# Patient Record
Sex: MALE | Race: BLACK OR AFRICAN AMERICAN | NOT HISPANIC OR LATINO | Employment: OTHER | URBAN - METROPOLITAN AREA
[De-identification: names, ages, dates, MRNs, and addresses within clinical notes are randomized per-mention and may not be internally consistent; named-entity substitution may affect disease eponyms.]

---

## 2019-12-19 ENCOUNTER — TELEPHONE (OUTPATIENT)
Dept: UROLOGY | Facility: CLINIC | Age: 64
End: 2019-12-19

## 2020-02-13 ENCOUNTER — TELEPHONE (OUTPATIENT)
Dept: UROLOGY | Facility: CLINIC | Age: 65
End: 2020-02-13

## 2020-02-17 ENCOUNTER — TELEPHONE (OUTPATIENT)
Dept: UROLOGY | Facility: CLINIC | Age: 65
End: 2020-02-17

## 2020-03-10 ENCOUNTER — OFFICE VISIT (OUTPATIENT)
Dept: UROLOGY | Facility: CLINIC | Age: 65
End: 2020-03-10
Payer: COMMERCIAL

## 2020-03-10 VITALS
DIASTOLIC BLOOD PRESSURE: 81 MMHG | BODY MASS INDEX: 22.12 KG/M2 | HEIGHT: 71 IN | HEART RATE: 78 BPM | WEIGHT: 158 LBS | SYSTOLIC BLOOD PRESSURE: 129 MMHG

## 2020-03-10 DIAGNOSIS — R97.20 ELEVATED PSA: ICD-10-CM

## 2020-03-10 DIAGNOSIS — R39.89 ABNORMAL PROSTATE EXAM: Primary | ICD-10-CM

## 2020-03-10 LAB
BILIRUB SERPL-MCNC: NORMAL MG/DL
BLOOD URINE, POC: NORMAL
COLOR, POC UA: YELLOW
GLUCOSE UR QL STRIP: 500
KETONES UR QL STRIP: NORMAL
LEUKOCYTE ESTERASE URINE, POC: NORMAL
NITRITE, POC UA: NORMAL
PH, POC UA: 5
PROTEIN, POC: 30
SPECIFIC GRAVITY, POC UA: 1.02
UROBILINOGEN, POC UA: NORMAL

## 2020-03-10 PROCEDURE — 3008F BODY MASS INDEX DOCD: CPT | Mod: CPTII,S$GLB,, | Performed by: UROLOGY

## 2020-03-10 PROCEDURE — 99204 PR OFFICE/OUTPT VISIT, NEW, LEVL IV, 45-59 MIN: ICD-10-PCS | Mod: 25,S$GLB,, | Performed by: UROLOGY

## 2020-03-10 PROCEDURE — 3008F PR BODY MASS INDEX (BMI) DOCUMENTED: ICD-10-PCS | Mod: CPTII,S$GLB,, | Performed by: UROLOGY

## 2020-03-10 PROCEDURE — 1101F PR PT FALLS ASSESS DOC 0-1 FALLS W/OUT INJ PAST YR: ICD-10-PCS | Mod: CPTII,S$GLB,, | Performed by: UROLOGY

## 2020-03-10 PROCEDURE — 99204 OFFICE O/P NEW MOD 45 MIN: CPT | Mod: 25,S$GLB,, | Performed by: UROLOGY

## 2020-03-10 PROCEDURE — 1101F PT FALLS ASSESS-DOCD LE1/YR: CPT | Mod: CPTII,S$GLB,, | Performed by: UROLOGY

## 2020-03-10 PROCEDURE — 81002 URINALYSIS NONAUTO W/O SCOPE: CPT | Mod: S$GLB,,, | Performed by: UROLOGY

## 2020-03-10 PROCEDURE — 81002 POCT URINE DIPSTICK WITHOUT MICROSCOPE: ICD-10-PCS | Mod: S$GLB,,, | Performed by: UROLOGY

## 2020-03-10 RX ORDER — TAMSULOSIN HYDROCHLORIDE 0.4 MG/1
1 CAPSULE ORAL
COMMUNITY
Start: 2019-11-04 | End: 2024-03-15

## 2020-03-10 RX ORDER — CIPROFLOXACIN 500 MG/1
500 TABLET ORAL 2 TIMES DAILY
Qty: 4 TABLET | Refills: 0 | Status: SHIPPED | OUTPATIENT
Start: 2020-03-10 | End: 2020-03-12

## 2020-03-10 RX ORDER — SIMVASTATIN 40 MG/1
1 TABLET, FILM COATED ORAL
COMMUNITY
Start: 2019-12-11 | End: 2024-03-15

## 2020-03-10 RX ORDER — METFORMIN HYDROCHLORIDE 500 MG/1
1 TABLET ORAL
COMMUNITY
Start: 2019-12-11 | End: 2021-02-02

## 2020-03-10 NOTE — PROGRESS NOTES
"Subjective:      Lan Robles is a 64 y.o. male who was referred by Dr Matthew Heath for evaluation of elevated PSA and abnormal prostate exam.    +obstructive LUTS  Pt is     No fam hist of prostate ca    No bone pain  His brother  of AIDS yesterday    Some weight loss due to not eating because he has been upset about his brother    See Dr Heath's ntoes:            The following portions of the patient's history were reviewed and updated as appropriate: allergies, current medications, past family history, past medical history, past social history, past surgical history and problem list.    Review of Systems  Constitutional: no fever or chills  ENT: no nasal congestion or sore throat  Respiratory: no cough or shortness of breath  Cardiovascular: no chest pain or palpitations  Gastrointestinal: no nausea or vomiting, tolerating diet  Genitourinary: as per HPI  Hematologic/Lymphatic: no easy bruising or lymphadenopathy  Musculoskeletal: no arthralgias or myalgias  Neurological: no seizures or tremors  Behavioral/Psych: no auditory or visual hallucinations     Objective:   Vitals: /81 (BP Location: Right arm, Patient Position: Sitting, BP Method: X-Large (Automatic))   Pulse 78   Ht 5' 11" (1.803 m)   Wt 71.7 kg (158 lb)   BMI 22.04 kg/m²     Physical Exam   General: alert and oriented, no acute distress  Head: normocephalic, atraumatic  Neck: supple, no lymphadenopathy, normal ROM, no masses  Respiratory: Symmetric expansion, non-labored breathing  Cardiovascular: regular rate and rhythm, nomal pulses, no peripheral edema  Abdomen: soft, non tender, non distended, no palpable masses, no hernias, no hepatomegaly or splenomegaly  Genitourinary:   Penis: normal, no lesions, patent orthotopic meatus, no plaques  Scrotum: no rashes or skin changes;   Testes: descended bilaterally, no masses, nontender, normal epididymides bilaterally, no hydroceles  Prostate: see diagram; seminal " vesicles not palpated  Rectum: normal rectal tone, no rectal mass, normal perineum  Lymphatic: no inguinal nodes  Skin: normal coloration and turgor, no rashes, no suspicious skin lesions noted  Neuro: alert and oriented x3, no gross deficits  Psych: normal judgment and insight, normal mood/affect and non-anxious  Prostate is large and rock hard  Nodular    Physical Exam   Genitourinary:           Lab Review   Urinalysis demonstrates +glucose and protein otherwise neg      Lab Results   Component Value Date    WBC 9.85 05/19/2010    HGB 15.2 05/19/2010    HCT 45.7 05/19/2010    MCV 85.6 05/19/2010     05/19/2010     Lab Results   Component Value Date    CREATININE 1.0 05/19/2010    BUN 14 05/19/2010        per Dr. Heath's notes    No results found for: PSA  Imaging  -  Assessment:     1. Abnormal prostate exam    2. Elevated PSA      -  Plan:     Orders Placed This Encounter    Transrectal Ultrasound w/ Biopsy    POCT URINE DIPSTICK WITHOUT MICROSCOPE    ciprofloxacin HCl (CIPRO) 500 MG tablet   will schedule biopsy this week    I explained that I am concerned he may have prostate cancer  Discussed biopsy and answered all of his questions    I gave him my condolences on his brother's passing

## 2020-03-12 ENCOUNTER — PROCEDURE VISIT (OUTPATIENT)
Dept: UROLOGY | Facility: CLINIC | Age: 65
End: 2020-03-12
Payer: COMMERCIAL

## 2020-03-12 VITALS
HEIGHT: 71 IN | HEART RATE: 84 BPM | SYSTOLIC BLOOD PRESSURE: 134 MMHG | WEIGHT: 158 LBS | BODY MASS INDEX: 22.12 KG/M2 | DIASTOLIC BLOOD PRESSURE: 91 MMHG

## 2020-03-12 DIAGNOSIS — R97.20 ELEVATED PSA: ICD-10-CM

## 2020-03-12 DIAGNOSIS — R39.89 ABNORMAL PROSTATE EXAM: Primary | ICD-10-CM

## 2020-03-12 LAB
BILIRUB SERPL-MCNC: ABNORMAL MG/DL
BLOOD URINE, POC: ABNORMAL
COLOR, POC UA: ABNORMAL
GLUCOSE UR QL STRIP: 250
KETONES UR QL STRIP: ABNORMAL
LEUKOCYTE ESTERASE URINE, POC: ABNORMAL
NITRITE, POC UA: ABNORMAL
PH, POC UA: 5
PROTEIN, POC: + 30
SPECIFIC GRAVITY, POC UA: 1.03
UROBILINOGEN, POC UA: NORMAL

## 2020-03-12 PROCEDURE — 96372 PR INJECTION,THERAP/PROPH/DIAG2ST, IM OR SUBCUT: ICD-10-PCS | Mod: 59,S$GLB,, | Performed by: UROLOGY

## 2020-03-12 PROCEDURE — 55700 TRANSRECTAL ULTRASOUND W/ BIOPSY: CPT | Mod: S$GLB,,, | Performed by: UROLOGY

## 2020-03-12 PROCEDURE — 55700 TRANSRECTAL ULTRASOUND W/ BIOPSY: ICD-10-PCS | Mod: S$GLB,,, | Performed by: UROLOGY

## 2020-03-12 PROCEDURE — 88305 TISSUE EXAM BY PATHOLOGIST: CPT | Performed by: PATHOLOGY

## 2020-03-12 PROCEDURE — 88305 TISSUE EXAM BY PATHOLOGIST: CPT | Mod: 26,,, | Performed by: PATHOLOGY

## 2020-03-12 PROCEDURE — 96372 THER/PROPH/DIAG INJ SC/IM: CPT | Mod: 59,S$GLB,, | Performed by: UROLOGY

## 2020-03-12 PROCEDURE — 76872 TRANSRECTAL ULTRASOUND W/ BIOPSY: ICD-10-PCS | Mod: 26,S$GLB,, | Performed by: UROLOGY

## 2020-03-12 PROCEDURE — 88305 TISSUE EXAM BY PATHOLOGIST: ICD-10-PCS | Mod: 26,,, | Performed by: PATHOLOGY

## 2020-03-12 PROCEDURE — 81002 POCT URINE DIPSTICK WITHOUT MICROSCOPE: ICD-10-PCS | Mod: S$GLB,,, | Performed by: UROLOGY

## 2020-03-12 PROCEDURE — 76872 US TRANSRECTAL: CPT | Mod: 26,S$GLB,, | Performed by: UROLOGY

## 2020-03-12 PROCEDURE — 81002 URINALYSIS NONAUTO W/O SCOPE: CPT | Mod: S$GLB,,, | Performed by: UROLOGY

## 2020-03-12 RX ORDER — GENTAMICIN SULFATE 40 MG/ML
80 INJECTION, SOLUTION INTRAMUSCULAR; INTRAVENOUS
Status: COMPLETED | OUTPATIENT
Start: 2020-03-12 | End: 2020-03-12

## 2020-03-12 RX ORDER — LIDOCAINE HYDROCHLORIDE 10 MG/ML
1 INJECTION INFILTRATION; PERINEURAL
Status: COMPLETED | OUTPATIENT
Start: 2020-03-12 | End: 2020-03-12

## 2020-03-12 RX ORDER — LIDOCAINE HYDROCHLORIDE 20 MG/ML
JELLY TOPICAL
Status: COMPLETED | OUTPATIENT
Start: 2020-03-12 | End: 2020-03-12

## 2020-03-12 RX ADMIN — LIDOCAINE HYDROCHLORIDE 20 ML: 10 INJECTION INFILTRATION; PERINEURAL at 03:03

## 2020-03-12 RX ADMIN — LIDOCAINE HYDROCHLORIDE 5 ML: 20 JELLY TOPICAL at 03:03

## 2020-03-12 RX ADMIN — GENTAMICIN SULFATE 80 MG: 40 INJECTION, SOLUTION INTRAMUSCULAR; INTRAVENOUS at 02:03

## 2020-03-12 NOTE — PATIENT INSTRUCTIONS

## 2020-03-12 NOTE — PROCEDURES
"Transrectal Ultrasound w/ Biopsy  Date/Time: 3/12/2020 2:53 PM  Performed by: Jeff Avalos MD  Authorized by: Jeff Avalos MD     Consent Done?:  Yes (Written)  Time out: Immediately prior to procedure a "time out" was called to verify the correct patient, procedure, equipment, support staff and site/side marked as required.    Indications: Prostate Nodules and Elevated PSA    Preparation: Patient was prepped and draped in usual sterile fashion    Position:  Left lateral  Anesthesia:  Lidocaine jelly and 20cc's 1% Lidocaine  Patient sedated: No    Lesions:: Yes         Type:  Hypoechoic  Left Base Biopsies: 1  Left Mid Biopsies: 1  Left [place] Biopsies: 1  Right Base Biopsies: 1  Right Mid Biopsies: 1  Right [place] Biopsies: 1  Total Biopsies:  6    Patient tolerance:  Patient tolerated the procedure well with no immediate complications     Entire prostate is abnormal in appearance with marked heterogenicity  Possible extraprostatic extension  Seminal vesicle angles are normal however  No median lobe    Prostate nodules  Elevated PSA greater than 100    UA nitrite negative, white cells negative  Cipro Gent done  Enemas done  Standard instructions give    Return to clinic 2 weeks.  The patient requested that I call him with the pathology results are available sooner        "

## 2020-03-20 ENCOUNTER — TELEPHONE (OUTPATIENT)
Dept: UROLOGY | Facility: CLINIC | Age: 65
End: 2020-03-20

## 2020-03-20 DIAGNOSIS — R39.89 ABNORMAL PROSTATE EXAM: Primary | ICD-10-CM

## 2020-03-20 LAB — FINAL PATHOLOGIC DIAGNOSIS: NORMAL

## 2020-03-20 NOTE — TELEPHONE ENCOUNTER
SPOKE TO THE PATIENT AND WAS INFORMED HE DOES NOT HAVE ANY DEVICES TO DO V/V HE RESCHEDULED THE APPOINTMENT TO 05/20 .RESULTS ARE STILL IN PROCESS AS OF 03/12/20.

## 2020-03-23 ENCOUNTER — TELEPHONE (OUTPATIENT)
Dept: UROLOGY | Facility: CLINIC | Age: 65
End: 2020-03-23

## 2020-03-23 NOTE — TELEPHONE ENCOUNTER
----- Message from Jeff Avalos MD sent at 3/23/2020  4:14 PM CDT -----  All,    This patient is NOT a prostate biopsy tomorrow    We did the biopsy last week    I will give him the path results in person tomorrow.   The other visits will all be telmedecine     Thanks!    Sc

## 2020-03-24 ENCOUNTER — HOSPITAL ENCOUNTER (OUTPATIENT)
Dept: RADIOLOGY | Facility: OTHER | Age: 65
Discharge: HOME | End: 2020-03-24
Attending: UROLOGY
Payer: COMMERCIAL

## 2020-03-24 ENCOUNTER — OFFICE VISIT (OUTPATIENT)
Dept: UROLOGY | Facility: CLINIC | Age: 65
End: 2020-03-24
Payer: COMMERCIAL

## 2020-03-24 VITALS
DIASTOLIC BLOOD PRESSURE: 89 MMHG | BODY MASS INDEX: 22.12 KG/M2 | HEIGHT: 71 IN | SYSTOLIC BLOOD PRESSURE: 132 MMHG | WEIGHT: 158 LBS | HEART RATE: 77 BPM

## 2020-03-24 DIAGNOSIS — C61 PC (PROSTATE CANCER): Primary | ICD-10-CM

## 2020-03-24 DIAGNOSIS — D49.59 NEOPLASM OF PROSTATE: ICD-10-CM

## 2020-03-24 DIAGNOSIS — C61 PC (PROSTATE CANCER): ICD-10-CM

## 2020-03-24 DIAGNOSIS — R39.89 ABNORMAL PROSTATE EXAM: ICD-10-CM

## 2020-03-24 PROCEDURE — A9503 TC99M MEDRONATE: HCPCS

## 2020-03-24 PROCEDURE — 25500020 PHARM REV CODE 255: Performed by: UROLOGY

## 2020-03-24 PROCEDURE — 74178 CT ABD&PLV WO CNTR FLWD CNTR: CPT | Mod: TC

## 2020-03-24 PROCEDURE — 78306 BONE IMAGING WHOLE BODY: CPT | Mod: 26,,, | Performed by: RADIOLOGY

## 2020-03-24 PROCEDURE — 78306 NM BONE SCAN WHOLE BODY: ICD-10-PCS | Mod: 26,,, | Performed by: RADIOLOGY

## 2020-03-24 PROCEDURE — 1101F PR PT FALLS ASSESS DOC 0-1 FALLS W/OUT INJ PAST YR: ICD-10-PCS | Mod: CPTII,S$GLB,, | Performed by: UROLOGY

## 2020-03-24 PROCEDURE — 3008F BODY MASS INDEX DOCD: CPT | Mod: CPTII,S$GLB,, | Performed by: UROLOGY

## 2020-03-24 PROCEDURE — 99214 OFFICE O/P EST MOD 30 MIN: CPT | Mod: S$GLB,,, | Performed by: UROLOGY

## 2020-03-24 PROCEDURE — 3008F PR BODY MASS INDEX (BMI) DOCUMENTED: ICD-10-PCS | Mod: CPTII,S$GLB,, | Performed by: UROLOGY

## 2020-03-24 PROCEDURE — 1101F PT FALLS ASSESS-DOCD LE1/YR: CPT | Mod: CPTII,S$GLB,, | Performed by: UROLOGY

## 2020-03-24 PROCEDURE — 74178 CT ABD&PLV WO CNTR FLWD CNTR: CPT | Mod: 26,,, | Performed by: RADIOLOGY

## 2020-03-24 PROCEDURE — 74178 CT ABDOMEN PELVIS W WO CONTRAST: ICD-10-PCS | Mod: 26,,, | Performed by: RADIOLOGY

## 2020-03-24 PROCEDURE — 99214 PR OFFICE/OUTPT VISIT, EST, LEVL IV, 30-39 MIN: ICD-10-PCS | Mod: S$GLB,,, | Performed by: UROLOGY

## 2020-03-24 RX ADMIN — IOHEXOL 75 ML: 350 INJECTION, SOLUTION INTRAVENOUS at 12:03

## 2020-03-24 NOTE — PROGRESS NOTES
Subjective:      Lan Robles is a 64 y.o. male who returns today regarding his     Here to discuss path results    No complications following biopsy.    The following portions of the patient's history were reviewed and updated as appropriate: allergies, current medications, past family history, past medical history, past social history, past surgical history and problem list.    Review of Systems  Pertinent items are noted in HPI.  A comprehensive multipoint review of systems was negative except as otherwise stated in the HPI.     Objective:   Vitals: There were no vitals taken for this visit.    Physical Exam   General: alert and oriented, no acute distress  Respiratory: Symmetric expansion, non-labored breathing  Cardiovascular: normal to inspection  Abdomen: non distended   Skin: normal coloration and turgor, no rashes, no suspicious skin lesions noted  Neuro: no gross deficits  Psych: normal judgment and insight, normal mood/affect and non-anxious    Physical Exam    Lab Review   Urinalysis demonstrates no specimen    Lab Results   Component Value Date    WBC 9.85 05/19/2010    HGB 15.2 05/19/2010    HCT 45.7 05/19/2010    MCV 85.6 05/19/2010     05/19/2010     Lab Results   Component Value Date    CREATININE 1.0 05/19/2010    BUN 14 05/19/2010   No results found for: PSA, PSADIAG, PSATOTAL, PSAFREE, PSAFREEPCT    psa 123    Final Pathologic Diagnosis RELIAPATH DIAGNOSIS:   A.   PROSTATE, RIGHT BASE, NEEDLE BIOPSY:         PROSTATE GLAND:  NEEDLE BIOPSY         HISTOLOGIC TYPE:  Acinar adenocarcinoma.         HISTOLOGIC GRADE:           Grade Group 5 (Montgomery Score 4+5=9).         INTRADUCTAL CARCINOMA:  Not identified.         TUMOR QUANTITATION:           Number cores positive:  1           Total number of cores:  1         CONTINUOUS MEASUREMENT:           Estimated percentage of prostatic tissue involved by continuous   tumor:  42%.           Total linear millimeters of carcinoma:  5 mm            Total linear millimeters of needle core tissue:  12 mm         PERINEURAL INVASION:  Not identified.  (C61.)   B.   PROSTATE, RIGHT MID, NEEDLE BIOPSY:         PROSTATE GLAND:  NEEDLE BIOPSY         HISTOLOGIC TYPE:  Acinar adenocarcinoma.         HISTOLOGIC GRADE:           Grade Group 4 (Sina Score 4+4=8).         INTRADUCTAL CARCINOMA:  Not identified.         TUMOR QUANTITATION:           Number cores positive:  1           Total number of cores:  1         CONTINUOUS MEASUREMENT:           Estimated percentage of prostatic tissue involved by continuous   tumor:  54%.           Total linear millimeters of carcinoma:  7 mm           Total linear millimeters of needle core tissue:  13 mm         PERINEURAL INVASION:  Not identified.  (C61.)   C.   PROSTATE, RIGHT APEX, NEEDLE BIOPSY:         PROSTATE GLAND:  NEEDLE BIOPSY         HISTOLOGIC TYPE:  Acinar adenocarcinoma.         HISTOLOGIC GRADE:           Grade Group 3 (Cascade Score 4+3=7).         PERCENTAGE OF PATTERN 4 IN SINA SCORE 4+3 CANCER:  80%         INTRADUCTAL CARCINOMA:  Not identified.         TUMOR QUANTITATION:           Number cores positive:  1           Total number of cores:  1         CONTINUOUS MEASUREMENT:           Estimated percentage of prostatic tissue involved by continuous   tumor:  80%.           Total linear millimeters of carcinoma:  12 mm           Total linear millimeters of needle core tissue:  15 mm         PERINEURAL INVASION:  Present.  (C61.)   D.   PROSTATE, LEFT BASE, NEEDLE BIOPSY:         - Benign prostatic tissue.         - Seminal vesicle tissue present.  (R97.20)   E.   PROSTATE, LEFT MID, NEEDLE BIOPSY:         PROSTATE GLAND:  NEEDLE BIOPSY         HISTOLOGIC TYPE:  Acinar adenocarcinoma.         HISTOLOGIC GRADE:           Grade Group 5 (Sina Score 4+5=9).         INTRADUCTAL CARCINOMA:  Not identified.         TUMOR QUANTITATION:           Number cores positive:  1           Total number of cores:  1          CONTINUOUS MEASUREMENT:           Estimated percentage of prostatic tissue involved by continuous   tumor:  46%.           Total linear millimeters of carcinoma:  6 mm           Total linear millimeters of needle core tissue:  13 mm         PERINEURAL INVASION:  Not identified.  (C61.)   F.   PROSTATE, LEFT APEX, NEEDLE BIOPSY:         PROSTATE GLAND:  NEEDLE BIOPSY         HISTOLOGIC TYPE:  Acinar adenocarcinoma.         HISTOLOGIC GRADE:           Grade Group 5 (Denver Score 4+5=9).         INTRADUCTAL CARCINOMA:  Not identified.         TUMOR QUANTITATION:           Number cores positive:  1           Total number of cores:  1         CONTINUOUS MEASUREMENT:           Estimated percentage of prostatic tissue involved by continuous   tumor:  20%.           Total linear millimeters of carcinoma:  2 mm           Total linear millimeters of needle core tissue:  10 mm         PERINEURAL INVASION:  Not identified.  (C61.)   CLINT GARCIA M.D.   Report attached.   The technical component was performed by:   Jeff at [address] [place] [id number]   Read at [address] [place] [id number]    Comment: Interpreted by: Rachel Kerr M.D., Signed on 03/20/2020 at 17:03         Imaging  -  Assessment and Plan:   PC (prostate cancer)  Sina 9 [id number]; psa 123  -     CT Abdomen Pelvis W Wo Contrast; Future; Expected date: 03/24/2020  -     NM Bone Scan Whole Body; Future; Expected date: 03/24/2020  -     Comprehensive metabolic panel; Future; Expected date: 03/24/2020  -     CBC auto differential; Future; Expected date: 03/24/2020    Abnormal prostate exam  -     Transrectal Ultrasound w/ Biopsy    Other orders  -     degarelix (FIRMAGON) injection 240 mg nurse visit ASAP after above then RTC 1 month for Eligard 45mg       We discussed that he has prostate cancer and the high risk nature of his disease.  We discussed the risks and benefits of sugery, radiation, chemotherapy,  hormonal therapy, and combinations of these.  We discussed open and robotic surgical approaches.  We discussed that he is likely to need multiple forms of treatment given his high risk disease.  We discussed that active surveillance may not be the best option with high risk disease.  We discussed referral to radiation oncology and medical oncology.

## 2020-03-30 ENCOUNTER — CLINICAL SUPPORT (OUTPATIENT)
Dept: UROLOGY | Facility: CLINIC | Age: 65
End: 2020-03-30
Payer: COMMERCIAL

## 2020-03-30 PROCEDURE — 96372 PR INJECTION,THERAP/PROPH/DIAG2ST, IM OR SUBCUT: ICD-10-PCS | Mod: S$GLB,,, | Performed by: UROLOGY

## 2020-03-30 PROCEDURE — 96372 THER/PROPH/DIAG INJ SC/IM: CPT | Mod: S$GLB,,, | Performed by: UROLOGY

## 2020-03-30 NOTE — PROGRESS NOTES
Pt here today for Firmagon. Diagnosis: Neoplasm of prostate. Prepped pt. Right and Left upper quad abdomen with alcohol swab an administered to Right and Left upper quad abdomen with Firmagon 240 mg injection @ a 45 degree angle covered with bandaid, no blood return. Tolerated well, instructed pt of s/s's of site reactions and how to treat if any reactions occur. Monitored for 15 mins.

## 2020-04-23 DIAGNOSIS — D49.59 NEOPLASM OF PROSTATE: Primary | ICD-10-CM

## 2020-04-29 ENCOUNTER — OFFICE VISIT (OUTPATIENT)
Dept: UROLOGY | Facility: CLINIC | Age: 65
End: 2020-04-29
Payer: COMMERCIAL

## 2020-04-29 VITALS — HEART RATE: 91 BPM | DIASTOLIC BLOOD PRESSURE: 77 MMHG | SYSTOLIC BLOOD PRESSURE: 121 MMHG

## 2020-04-29 DIAGNOSIS — N28.89 RENAL MASS: ICD-10-CM

## 2020-04-29 DIAGNOSIS — C61 PROSTATE CANCER: Primary | ICD-10-CM

## 2020-04-29 PROCEDURE — 96402 CHEMO HORMON ANTINEOPL SQ/IM: CPT | Mod: S$GLB,,, | Performed by: NURSE PRACTITIONER

## 2020-04-29 PROCEDURE — 1101F PR PT FALLS ASSESS DOC 0-1 FALLS W/OUT INJ PAST YR: ICD-10-PCS | Mod: CPTII,S$GLB,, | Performed by: NURSE PRACTITIONER

## 2020-04-29 PROCEDURE — 99213 OFFICE O/P EST LOW 20 MIN: CPT | Mod: 25,S$GLB,, | Performed by: NURSE PRACTITIONER

## 2020-04-29 PROCEDURE — 1101F PT FALLS ASSESS-DOCD LE1/YR: CPT | Mod: CPTII,S$GLB,, | Performed by: NURSE PRACTITIONER

## 2020-04-29 PROCEDURE — 96402 PR CHEMOTHER HORMON ANTINEOPL SUB-Q/IM: ICD-10-PCS | Mod: S$GLB,,, | Performed by: NURSE PRACTITIONER

## 2020-04-29 PROCEDURE — 99213 PR OFFICE/OUTPT VISIT, EST, LEVL III, 20-29 MIN: ICD-10-PCS | Mod: 25,S$GLB,, | Performed by: NURSE PRACTITIONER

## 2020-04-29 NOTE — PROGRESS NOTES
Subjective:      Lan Robles is a 64 y.o. male who returns today regarding his prostate cancer.    The patient was recently diagnosed with rosy 9 prostate cancer ( at the time of biopsy). After discussing treatment options with Dr. Avalos, he has decided to proceed with ADT. He received Firmagon 240 mg on 4/1 and returns today for eligard injection. Bone scan on 3/24 showed no evidence of metastatic disease. CT abdomen/pelvis showed no evidence of metastatic disease. There was an incidental 1.8 cm solid upper pole right renal lesion concerning for RCC.     Doing well. Denies bothersome urinary symptoms. Remains on flomax. Denies adverse SE from the firmagon.     The following portions of the patient's history were reviewed and updated as appropriate: allergies, current medications, past family history, past medical history, past social history, past surgical history and problem list.    Review of Systems  Constitutional: no fever or chills  ENT: no nasal congestion or sore throat  Respiratory: no cough or shortness of breath  Cardiovascular: no chest pain or palpitations  Gastrointestinal: no nausea or vomiting, tolerating diet  Genitourinary: as per HPI  Hematologic/Lymphatic: no easy bruising or lymphadenopathy  Musculoskeletal: no arthralgias or myalgias  Neurological: no seizures or tremors  Behavioral/Psych: no auditory or visual hallucinations     Objective:   Vitals:   Vitals:    04/29/20 0805   BP: 121/77   Pulse: 91       Physical Exam   General: alert and oriented, no acute distress  Head: normocephalic, atraumatic  Neck: supple, no lymphadenopathy, normal ROM, no masses  Respiratory: Symmetric expansion, non-labored breathing  Cardiovascular: regular rate and rhythm, nomal pulses, no peripheral edema  Abdomen: soft, non tender, non distended, no palpable masses, no hernias, no hepatomegaly or splenomegaly  Genitourinary: deferred  Lymphatic: no inguinal nodes  Skin: normal coloration and  "turgor, no rashes, no suspicious skin lesions noted  Neuro: alert and oriented x3, no gross deficits  Psych: normal judgment and insight, normal mood/affect and non-anxious    Lab Review   Urinalysis demonstrates: no specimen  Lab Results   Component Value Date    WBC 11.77 03/24/2020    HGB 15.0 03/24/2020    HCT 46.9 03/24/2020    MCV 86 03/24/2020     03/24/2020     Lab Results   Component Value Date    CREATININE 1.2 03/24/2020    BUN 15 03/24/2020     No results found for: PSA  Imaging   (all images personally reviewed; agree with report below)  Bone scan- no evidence of metastatic disease  CT abdomen/pelvis- "No evidence of metastatic disease. Small (1.8 cm) right upper pole solid renal lesion, concerning for renal cell carcinoma. Bilateral nonobstructing renal stones."     Assessment:   Prostate cancer   Renal mass    Plan:   Diagnoses and all orders for this visit:    Prostate cancer Sina 9 [id number]; psa 123 on ADT (eligard 4/29/20)  -     leuprolide (6 month) (ELIGARD) injection 45 mg  -     Prostate Specific Antigen, Diagnostic; Future  -     Testosterone; Future    Renal mass    Plan:  --Eligard today  --Reviewed potential SE of ADT  --Begin calcium + vitamin D and multivitamin daily   --Follow up with Dr. Avalos in 3 months with PSA and testosterone       "

## 2020-04-30 ENCOUNTER — TELEPHONE (OUTPATIENT)
Dept: UROLOGY | Facility: CLINIC | Age: 65
End: 2020-04-30

## 2020-04-30 NOTE — TELEPHONE ENCOUNTER
----- Message from Cehy Saini NP sent at 4/29/2020  8:18 AM CDT -----  Please schedule follow up with Dr. Avalos in 3 months with PSA and testosterone. Thanks!

## 2020-07-21 ENCOUNTER — LAB VISIT (OUTPATIENT)
Dept: LAB | Facility: OTHER | Age: 65
End: 2020-07-21
Attending: UROLOGY
Payer: COMMERCIAL

## 2020-07-21 DIAGNOSIS — C61 PROSTATE CANCER: ICD-10-CM

## 2020-07-21 LAB
COMPLEXED PSA SERPL-MCNC: 80.9 NG/ML (ref 0–4)
TESTOST SERPL-MCNC: 162 NG/DL (ref 304–1227)

## 2020-07-21 PROCEDURE — 36415 COLL VENOUS BLD VENIPUNCTURE: CPT

## 2020-07-21 PROCEDURE — 84153 ASSAY OF PSA TOTAL: CPT

## 2020-07-21 PROCEDURE — 84403 ASSAY OF TOTAL TESTOSTERONE: CPT

## 2020-07-22 ENCOUNTER — TELEPHONE (OUTPATIENT)
Dept: UROLOGY | Facility: CLINIC | Age: 65
End: 2020-07-22

## 2020-07-22 DIAGNOSIS — C61 PROSTATE CANCER: Primary | ICD-10-CM

## 2020-07-22 RX ORDER — BICALUTAMIDE 50 MG/1
50 TABLET, FILM COATED ORAL DAILY
Qty: 30 TABLET | Refills: 11 | Status: SHIPPED | OUTPATIENT
Start: 2020-07-22 | End: 2021-07-27 | Stop reason: SDUPTHER

## 2020-07-28 ENCOUNTER — OFFICE VISIT (OUTPATIENT)
Dept: UROLOGY | Facility: CLINIC | Age: 65
End: 2020-07-28
Payer: COMMERCIAL

## 2020-07-28 VITALS
HEART RATE: 81 BPM | SYSTOLIC BLOOD PRESSURE: 128 MMHG | BODY MASS INDEX: 22.12 KG/M2 | HEIGHT: 71 IN | DIASTOLIC BLOOD PRESSURE: 80 MMHG | WEIGHT: 158 LBS

## 2020-07-28 DIAGNOSIS — N28.89 RENAL MASS: ICD-10-CM

## 2020-07-28 DIAGNOSIS — C61 PROSTATE CANCER: Primary | ICD-10-CM

## 2020-07-28 PROCEDURE — 3008F BODY MASS INDEX DOCD: CPT | Mod: CPTII,S$GLB,, | Performed by: UROLOGY

## 2020-07-28 PROCEDURE — 1126F AMNT PAIN NOTED NONE PRSNT: CPT | Mod: S$GLB,,, | Performed by: UROLOGY

## 2020-07-28 PROCEDURE — 1101F PT FALLS ASSESS-DOCD LE1/YR: CPT | Mod: CPTII,S$GLB,, | Performed by: UROLOGY

## 2020-07-28 PROCEDURE — 1126F PR PAIN SEVERITY QUANTIFIED, NO PAIN PRESENT: ICD-10-PCS | Mod: S$GLB,,, | Performed by: UROLOGY

## 2020-07-28 PROCEDURE — 99214 OFFICE O/P EST MOD 30 MIN: CPT | Mod: S$GLB,,, | Performed by: UROLOGY

## 2020-07-28 PROCEDURE — 1159F PR MEDICATION LIST DOCUMENTED IN MEDICAL RECORD: ICD-10-PCS | Mod: S$GLB,,, | Performed by: UROLOGY

## 2020-07-28 PROCEDURE — 1101F PR PT FALLS ASSESS DOC 0-1 FALLS W/OUT INJ PAST YR: ICD-10-PCS | Mod: CPTII,S$GLB,, | Performed by: UROLOGY

## 2020-07-28 PROCEDURE — 1159F MED LIST DOCD IN RCRD: CPT | Mod: S$GLB,,, | Performed by: UROLOGY

## 2020-07-28 PROCEDURE — 3008F PR BODY MASS INDEX (BMI) DOCUMENTED: ICD-10-PCS | Mod: CPTII,S$GLB,, | Performed by: UROLOGY

## 2020-07-28 PROCEDURE — 99214 PR OFFICE/OUTPT VISIT, EST, LEVL IV, 30-39 MIN: ICD-10-PCS | Mod: S$GLB,,, | Performed by: UROLOGY

## 2020-07-28 NOTE — PROGRESS NOTES
Subjective:      Lan Robles is a 65 y.o. male who returns today regarding his       Had elilgard last month and firmagon 240mg 1 week prior    T was still high  Casodex started.    The following portions of the patient's history were reviewed and updated as appropriate: allergies, current medications, past family history, past medical history, past social history, past surgical history and problem list.    Review of Systems  Pertinent items are noted in HPI.  A comprehensive multipoint review of systems was negative except as otherwise stated in the HPI.     Objective:   Vitals: There were no vitals taken for this visit.    Physical Exam   General: alert and oriented, no acute distress  Respiratory: Symmetric expansion, non-labored breathing  Cardiovascular: normal to inspection  Abdomen: non distended   Skin: normal coloration and turgor, no rashes, no suspicious skin lesions noted  Neuro: no gross deficits  Psych: normal judgment and insight, normal mood/affect and non-anxious    Physical Exam    Lab Review   Urinalysis demonstrates negative for all components  Lab Results   Component Value Date    WBC 11.77 03/24/2020    HGB 15.0 03/24/2020    HCT 46.9 03/24/2020    MCV 86 03/24/2020     03/24/2020     Lab Results   Component Value Date    CREATININE 1.2 03/24/2020    BUN 15 03/24/2020     Lab Results   Component Value Date    PSADIAG 80.9 (H) 07/21/2020      before androgen deprivation was started  T 162 7/1/2020    Imaging  -  Assessment and Plan:   Prostate cancer  Belen 9 [id number]; psa 123 on ADT (eligard 4/29/20); testosterone has not decreased as much as anticipated  Repeat PSA and testosterone today  Cont casodex    Appointment with radiation oncology    RTC 3 months with psa, T CMP for Eligard 45mg  If T does not decrease, we will change to Firmagon 80mg monthly    Renal mass; surveillance  Will reimage in approximately 6 months

## 2020-07-30 ENCOUNTER — TELEPHONE (OUTPATIENT)
Dept: UROLOGY | Facility: CLINIC | Age: 65
End: 2020-07-30

## 2020-07-30 NOTE — TELEPHONE ENCOUNTER
Explained to patient that upcoming appt was for a consult, not to begin treatment.    ----- Message from Dorothy Avalos sent at 7/30/2020 11:14 AM CDT -----  Regarding: Advice  Contact: Lan  Type: Patient Call Back    Who called:Lan    What is the request in detail:Patient called with questions about upcoming visit to Radiology. Please call to advise    Can the clinic reply by [facility]?    Would the patient rather a call back or a response via [facility]? Call    Best call back number:[phone]

## 2020-08-03 NOTE — PROGRESS NOTES
CC:  Localized prostate cancer, high risk    HPI:  Mr. Robles is a 66 yo man with T2DM, HLD, who was referred by Dr Avalos for prostate cancer. He saw Dr Avalos in March of this year for evaluation of a markedly elevated PSA (159 ng/ml) and an abnormal CONCEPCIÓN. TRUS and biopsy on 3/12/20 revealed a hypoechoic prostate with possible extraprostatic extension.  The seminal vesicle angles appeared normal.  Biopsies revealed [place] 9 (4+5) adenocarcinoma. CT of the abdomen and pelvis and bone scan 3/24/20 was negative for evidence of metastatic disease. He had a small (1.8 cm) right upper pole solid renal lesion, concerning for renal cell carcinoma on CT scan. Hormonal  therapy was initiated with Firmagon followed by Lupron on 20.  On 20, PSA 80.9, testosterone 162. Casodex was added. On 20, PSA 69.7, testosterone 188.  Patient was seen by Dr Villalobos. RT in addition to ADT is recommended. Patient presents today for further management. Feeling well. Taking casodex. No complaints.     ECO    Past Medical History:   Diagnosis Date    Diabetes mellitus     Prostate cancer         No past surgical history on file.    Family History   Problem Relation Age of Onset    Cancer Father     Diabetes Mother        Social History     Socioeconomic History    Marital status:      Spouse name: Not on file    Number of children: Not on file    Years of education: Not on file    Highest education level: Not on file   Occupational History    Not on file   Social Needs    Financial resource strain: Not on file    Food insecurity     Worry: Not on file     Inability: Not on file    Transportation needs     Medical: Not on file     Non-medical: Not on file   Tobacco Use    Smoking status: Former Smoker    Smokeless tobacco: Never Used   Substance and Sexual Activity    Alcohol use: Yes    Drug use: Never    Sexual activity: Yes   Lifestyle    Physical activity     Days per week: Not on file      Minutes per session: Not on file    Stress: Not on file   Relationships    Social connections     Talks on phone: Not on file     Gets together: Not on file     Attends Alevism service: Not on file     Active member of club or organization: Not on file     Attends meetings of clubs or organizations: Not on file     Relationship status: Not on file   Other Topics Concern    Not on file   Social History Narrative    Not on file       Review of Systems   Constitutional: Negative for appetite change, chills, fatigue, fever and unexpected weight change.   HENT: Negative for mouth sores, nosebleeds, tinnitus, trouble swallowing and voice change.    Eyes: Negative for pain, redness and visual disturbance.   Respiratory: Negative for cough, shortness of breath and wheezing.    Cardiovascular: Negative for chest pain, palpitations and leg swelling.   Gastrointestinal: Negative for abdominal distention, abdominal pain, blood in stool, constipation, diarrhea, nausea and vomiting.   Endocrine: Negative for polydipsia, polyphagia and polyuria.   Genitourinary: Negative for flank pain, frequency and hematuria.   Musculoskeletal: Negative for arthralgias, back pain, gait problem, joint swelling, myalgias, neck pain and neck stiffness.   Skin: Negative for color change, pallor, rash and wound.   Neurological: Negative for tremors, seizures, syncope, speech difficulty, weakness, light-headedness, numbness and headaches.   Hematological: Negative for adenopathy. Does not bruise/bleed easily.   Psychiatric/Behavioral: Negative for confusion, dysphoric mood and self-injury. The patient is not nervous/anxious.    All other systems reviewed and are negative.      Objective:  Physical Exam   Constitutional: He is oriented to person, place, and time. He appears well-developed and well-nourished. No distress.   HENT:   Head: Normocephalic and atraumatic.   Mouth/Throat: Oropharynx is clear and moist. No oropharyngeal exudate.   Eyes:  Pupils are equal, round, and reactive to light. Conjunctivae and EOM are normal. No scleral icterus.   Neck: Normal range of motion. Neck supple. No JVD present. No thyromegaly present.   Cardiovascular: Normal rate, regular rhythm and normal heart sounds. Exam reveals no gallop and no friction rub.   No murmur heard.  Pulmonary/Chest: Effort normal and breath sounds normal. No respiratory distress. He has no wheezes. He has no rales.   Abdominal: Soft. Bowel sounds are normal. He exhibits no distension and no mass. There is no hepatosplenomegaly. There is no abdominal tenderness. There is no rebound. No hernia.   Musculoskeletal: Normal range of motion.         General: No tenderness, deformity or edema.   Lymphadenopathy:     He has no cervical adenopathy.        Right: No supraclavicular adenopathy present.        Left: No supraclavicular adenopathy present.   Neurological: He is alert and oriented to person, place, and time. No cranial nerve deficit. He exhibits normal muscle tone.   Skin: Skin is warm and dry. No rash noted. No erythema. No pallor.   Psychiatric: He has a normal mood and affect. His behavior is normal. Judgment and thought content normal.   Vitals reviewed.      Labs:  PSA in March 2020 was 159  7/21/20: PSA 80.9, testosterone 162.   7/28/20: PSA 69.7, testosterone 188.     Imaging Data:  Bone scan 3/24/2020:  There is no scintigraphic evidence of metastatic disease.     CT A/P 3/24/20:  No evidence of metastatic disease.     Small (1.8 cm) right upper pole solid renal lesion, concerning for renal cell carcinoma.     Bilateral nonobstructing renal stones.       Assessment and plan:    1. Prostate cancer    2. Renal mass      1.  - Mr Robles is a 66 yo man who presents today for further management of high risk localized prostate cancer  - testosterone not at castration level. F/u with Dr Avalos. May need firmagon if testosterone still not at castration level  - discussed with patient that I  agree with Dr Villalobos re Radiation + ADT  - see me in Dec for follow up    2.  - monitored by Dr Avalos. Plan is for surveillance scan     His multiple questions were answered in the clinic. Encouraged to call should issues arise.

## 2020-08-05 ENCOUNTER — INITIAL CONSULT (OUTPATIENT)
Dept: RADIATION ONCOLOGY | Facility: CLINIC | Age: 65
End: 2020-08-05
Attending: RADIOLOGY
Payer: COMMERCIAL

## 2020-08-05 VITALS
TEMPERATURE: 98 F | RESPIRATION RATE: 16 BRPM | WEIGHT: 161.31 LBS | HEART RATE: 77 BPM | HEIGHT: 71 IN | BODY MASS INDEX: 22.58 KG/M2 | DIASTOLIC BLOOD PRESSURE: 95 MMHG | SYSTOLIC BLOOD PRESSURE: 148 MMHG

## 2020-08-05 DIAGNOSIS — C61 PROSTATE CANCER: ICD-10-CM

## 2020-08-05 PROCEDURE — 3008F BODY MASS INDEX DOCD: CPT | Mod: CPTII,S$GLB,, | Performed by: RADIOLOGY

## 2020-08-05 PROCEDURE — 3008F PR BODY MASS INDEX (BMI) DOCUMENTED: ICD-10-PCS | Mod: CPTII,S$GLB,, | Performed by: RADIOLOGY

## 2020-08-05 PROCEDURE — 1159F MED LIST DOCD IN RCRD: CPT | Mod: S$GLB,,, | Performed by: RADIOLOGY

## 2020-08-05 PROCEDURE — 1159F PR MEDICATION LIST DOCUMENTED IN MEDICAL RECORD: ICD-10-PCS | Mod: S$GLB,,, | Performed by: RADIOLOGY

## 2020-08-05 PROCEDURE — 1126F PR PAIN SEVERITY QUANTIFIED, NO PAIN PRESENT: ICD-10-PCS | Mod: S$GLB,,, | Performed by: RADIOLOGY

## 2020-08-05 PROCEDURE — 1101F PT FALLS ASSESS-DOCD LE1/YR: CPT | Mod: CPTII,S$GLB,, | Performed by: RADIOLOGY

## 2020-08-05 PROCEDURE — 99999 PR PBB SHADOW E&M-EST. PATIENT-LVL IV: ICD-10-PCS | Mod: PBBFAC,,, | Performed by: RADIOLOGY

## 2020-08-05 PROCEDURE — 1126F AMNT PAIN NOTED NONE PRSNT: CPT | Mod: S$GLB,,, | Performed by: RADIOLOGY

## 2020-08-05 PROCEDURE — 99204 OFFICE O/P NEW MOD 45 MIN: CPT | Mod: S$GLB,,, | Performed by: RADIOLOGY

## 2020-08-05 PROCEDURE — 1101F PR PT FALLS ASSESS DOC 0-1 FALLS W/OUT INJ PAST YR: ICD-10-PCS | Mod: CPTII,S$GLB,, | Performed by: RADIOLOGY

## 2020-08-05 PROCEDURE — 99204 PR OFFICE/OUTPT VISIT, NEW, LEVL IV, 45-59 MIN: ICD-10-PCS | Mod: S$GLB,,, | Performed by: RADIOLOGY

## 2020-08-05 PROCEDURE — 99999 PR PBB SHADOW E&M-EST. PATIENT-LVL IV: CPT | Mod: PBBFAC,,, | Performed by: RADIOLOGY

## 2020-08-05 NOTE — PROGRESS NOTES
HISTORY OF PRESENT ILLNESS:   This patient presents for discussion of definitive radiotherapy for a recently diagnosed prostate cancer.      Mr. Robles was referred to Dr. Avalos in March of this year for evaluation of a markedly elevated PSA (159 ng/ml) and an abnormal CONCEPCIÓN.  Patient denied history of PSAs in the past.  Denied family history  of prostate cancer.  CONCEPCIÓN revealed bilateral induration with suspected extraprostatic extension and possible seminal vesicle involvement.  TRUS and biopsy on 3/12/20 revealed a hypoechoic prostate with possible extraprostatic extension.  The seminal vesicle angles appeared normal.  Biopsies from the Rt. base, Lt. mid gland and Lt. apex revealed Sina 9 (4+5) adenocarcinoma involving 20 - 45% of 3 of 3 cores.  Biopsies from the Rt. mid gland revealed Raleigh 8 (4+4) adenocarcinoma involving 54% of one core, and biopsies from the Rt. apex revealed Sina 7 (4+3) adenocarcinoma involving 80% of one core.  Further work up with CT of the abdomen and pelvis and bone scan was negative for evidence of metastatic disease.  Hormonal  therapy was initiated with Firmagon followed by Lupron on 4/29/20.  Repeat PSA and testosterone on 7/28/20 returned at 69.7 ng/ml and 188 ng/ml, respectively.  Casodex was added to his regiment.  Today the patient states he feels well.  Notes occasional hot flash.       REVIEW OF SYSTEMS:   Review of Systems   Constitutional: Positive for weight loss. Negative for chills and fever.   Respiratory: Positive for cough. Negative for sputum production and shortness of breath.    Cardiovascular: Negative for chest pain and palpitations.   Gastrointestinal: Negative for abdominal pain, constipation and diarrhea.   Genitourinary: Negative for dysuria, frequency, hematuria and urgency.         PAST MEDICAL HISTORY:  Past Medical History:   Diagnosis Date    Diabetes mellitus     Prostate cancer        PAST SURGICAL HISTORY:  History reviewed. No pertinent  surgical history.    ALLERGIES:   Review of patient's allergies indicates:  No Known Allergies    MEDICATIONS:  Current Outpatient Medications   Medication Sig    bicalutamide (CASODEX) 50 MG Tab Take 1 tablet (50 mg total) by mouth once daily.    metFORMIN (GLUCOPHAGE) 500 MG tablet Take 1 tablet by mouth.    simvastatin (ZOCOR) 40 MG tablet Take 1 tablet by mouth.    tamsulosin (FLOMAX) 0.4 mg Cap Take 1 capsule by mouth.     Current Facility-Administered Medications   Medication    leuprolide (6 month) (ELIGARD) injection 45 mg       SOCIAL HISTORY:  Social History     Socioeconomic History    Marital status:      Spouse name: Not on file    Number of children: Not on file    Years of education: Not on file    Highest education level: Not on file   Occupational History    Not on file   Social Needs    Financial resource strain: Not on file    Food insecurity     Worry: Not on file     Inability: Not on file    Transportation needs     Medical: Not on file     Non-medical: Not on file   Tobacco Use    Smoking status: Former Smoker    Smokeless tobacco: Never Used   Substance and Sexual Activity    Alcohol use: Yes    Drug use: Never    Sexual activity: Yes   Lifestyle    Physical activity     Days per week: Not on file     Minutes per session: Not on file    Stress: Not on file   Relationships    Social connections     Talks on phone: Not on file     Gets together: Not on file     Attends Alevism service: Not on file     Active member of club or organization: Not on file     Attends meetings of clubs or organizations: Not on file     Relationship status: Not on file   Other Topics Concern    Not on file   Social History Narrative    Not on file       FAMILY HISTORY:  Family History   Problem Relation Age of Onset    Cancer Father     Diabetes Mother          PHYSICAL EXAMINATION:  Vitals:    08/05/20 0839   BP: (!) 148/95   Pulse: 77   Resp:    Temp:      Physical Exam    Constitutional: He is oriented to person, place, and time and well-developed, well-nourished, and in no distress.   Pulmonary/Chest: Effort normal. No respiratory distress.   Abdominal: Soft. He exhibits no distension.   Neurological: He is alert and oriented to person, place, and time.   Psychiatric: Mood, affect and judgment normal.       ASSESSMENT/PLAN:  Stage IIIC (cT3a, cN0, cM0, PSA: 159, Grade Group: 5) adenocarcinoma of the prostate.       ECO    I had a long discussion with the patient and his daughter.  Explained he is considered to have high risk prostate cancer.  Explained that currently we see no evidence of distant metastatic disease although it is possible he has micrometastatic disease.  Discussed the options of hormonal therapy alone vs. hormonal therapy with definitive radiotherapy in an effort to cure him of his disease.  Discussed the pros and cons of each approach.  The patient and his family would like to proceed with definitive radiotherapy.  Overall, I think local therapy is reasonable given his abnormal CONCEPCIÓN.  Discussed the procedures, risks and benefits of radiotherapy.  Discussed the acute and long term side effects of therapy.  Will plan simulation in the next two weeks with treatment to begin thereafter.  Thank you  for allowing us to participate in the care of this patient.       I spent approximately 50 minutes reviewing the available records and evaluating the patient, out of which over 50% of the time was spent face to face with the patient in counseling and coordinating this patient's care.

## 2020-08-05 NOTE — LETTER
August 5, 2020      Jeff Avalos MD  [address]  [address]  [facility] [address]           [facility]  [address]  [facility] [address]  Phone: [phone]          Patient: Lan Robles   MR Number: [id number]   YOB: 1955   Date of Visit: 8/5/2020       Dear Dr. Jeff Avalos:    Thank you for referring Lan Robles to me for evaluation. Attached you will find relevant portions of my assessment and plan of care.    If you have questions, please do not hesitate to call me. I look forward to following Lan Robles along with you.    Sincerely,    Antonio Villalobos Jr., MD    Enclosure  CC:  No Recipients    If you would like to receive this communication electronically, please contact [email or web address] or [phone] to request more information on [facility] Link access.    For providers and/or their staff who would like to refer a patient to [facility], please contact us through our one-stop-shop provider referral line, [facility], at [phone].    If you feel you have received this communication in error or would no longer like to receive these types of communications, please e-mail [email or web address]

## 2020-08-07 ENCOUNTER — OFFICE VISIT (OUTPATIENT)
Dept: HEMATOLOGY/ONCOLOGY | Facility: CLINIC | Age: 65
End: 2020-08-07
Payer: COMMERCIAL

## 2020-08-07 VITALS
WEIGHT: 159.63 LBS | TEMPERATURE: 99 F | HEART RATE: 91 BPM | SYSTOLIC BLOOD PRESSURE: 128 MMHG | HEIGHT: 70 IN | OXYGEN SATURATION: 97 % | RESPIRATION RATE: 16 BRPM | BODY MASS INDEX: 22.85 KG/M2 | DIASTOLIC BLOOD PRESSURE: 79 MMHG

## 2020-08-07 DIAGNOSIS — C61 PROSTATE CANCER: Primary | ICD-10-CM

## 2020-08-07 DIAGNOSIS — N28.89 RENAL MASS: ICD-10-CM

## 2020-08-07 PROCEDURE — 99204 OFFICE O/P NEW MOD 45 MIN: CPT | Mod: S$GLB,,, | Performed by: INTERNAL MEDICINE

## 2020-08-07 PROCEDURE — 1159F PR MEDICATION LIST DOCUMENTED IN MEDICAL RECORD: ICD-10-PCS | Mod: S$GLB,,, | Performed by: INTERNAL MEDICINE

## 2020-08-07 PROCEDURE — 1101F PR PT FALLS ASSESS DOC 0-1 FALLS W/OUT INJ PAST YR: ICD-10-PCS | Mod: CPTII,S$GLB,, | Performed by: INTERNAL MEDICINE

## 2020-08-07 PROCEDURE — 99999 PR PBB SHADOW E&M-EST. PATIENT-LVL IV: CPT | Mod: PBBFAC,,, | Performed by: INTERNAL MEDICINE

## 2020-08-07 PROCEDURE — 99204 PR OFFICE/OUTPT VISIT, NEW, LEVL IV, 45-59 MIN: ICD-10-PCS | Mod: S$GLB,,, | Performed by: INTERNAL MEDICINE

## 2020-08-07 PROCEDURE — 1101F PT FALLS ASSESS-DOCD LE1/YR: CPT | Mod: CPTII,S$GLB,, | Performed by: INTERNAL MEDICINE

## 2020-08-07 PROCEDURE — 1126F AMNT PAIN NOTED NONE PRSNT: CPT | Mod: S$GLB,,, | Performed by: INTERNAL MEDICINE

## 2020-08-07 PROCEDURE — 99999 PR PBB SHADOW E&M-EST. PATIENT-LVL IV: ICD-10-PCS | Mod: PBBFAC,,, | Performed by: INTERNAL MEDICINE

## 2020-08-07 PROCEDURE — 3008F BODY MASS INDEX DOCD: CPT | Mod: CPTII,S$GLB,, | Performed by: INTERNAL MEDICINE

## 2020-08-07 PROCEDURE — 1159F MED LIST DOCD IN RCRD: CPT | Mod: S$GLB,,, | Performed by: INTERNAL MEDICINE

## 2020-08-07 PROCEDURE — 3008F PR BODY MASS INDEX (BMI) DOCUMENTED: ICD-10-PCS | Mod: CPTII,S$GLB,, | Performed by: INTERNAL MEDICINE

## 2020-08-07 PROCEDURE — 1126F PR PAIN SEVERITY QUANTIFIED, NO PAIN PRESENT: ICD-10-PCS | Mod: S$GLB,,, | Performed by: INTERNAL MEDICINE

## 2020-08-07 NOTE — LETTER
August 7, 2020      Chey Saini, NP  [address] [facility] [address] [facility] [address]           [facility] [address]  [address]  [facility] [address]  Phone: [phone]          Patient: Lan Robles   MR Number: [id number]   YOB: 1955   Date of Visit: 8/7/2020       Dear Chey Saini:    Thank you for referring Lan Robles to me for evaluation. Attached you will find relevant portions of my assessment and plan of care.    If you have questions, please do not hesitate to call me. I look forward to following Lan Robles along with you.    Sincerely,    Nicolle Gomez MD    Enclosure  CC:  No Recipients    If you would like to receive this communication electronically, please contact [email or web address] or [phone] to request more information on [email or web address] Link access.    For providers and/or their staff who would like to refer a patient to [facility], please contact us through our one-stop-shop provider referral line, [facility] , at [phone].    If you feel you have received this communication in error or would no longer like to receive these types of communications, please e-mail [email or web address]

## 2020-08-31 DIAGNOSIS — C61 PROSTATE CANCER: Primary | ICD-10-CM

## 2020-08-31 RX ORDER — CIPROFLOXACIN 500 MG/1
500 TABLET ORAL 2 TIMES DAILY
Qty: 4 TABLET | Refills: 0 | Status: SHIPPED | OUTPATIENT
Start: 2020-08-31 | End: 2020-09-02

## 2020-09-01 ENCOUNTER — TELEPHONE (OUTPATIENT)
Dept: UROLOGY | Facility: CLINIC | Age: 65
End: 2020-09-01

## 2020-09-01 NOTE — TELEPHONE ENCOUNTER
----- Message from Jeff Avalos MD sent at 8/31/2020  3:43 PM CDT -----  Myrna    I ordered a urine cs and cipro    Please tell him to start the cipro the day before the markers are placed.  Please review the usual prostate biopsy instructions with him    Thanks!    Sc    ----- Message -----  From: Myrna Arias MA  Sent: 8/31/2020   3:35 PM CDT  To: Jeff Avalos MD    PLEASE REVIEW . THANK YOU  ----- Message -----  From: Heather Charlton MA  Sent: 8/31/2020   2:42 PM CDT  To: Myrna Arias MA      ----- Message -----  From: Bernadette Do  Sent: 8/31/2020   2:14 PM CDT  To: Robbie Aguilar Staff    Type: Patient Call Back    Who called:Self    What is the request in detail:Called regarding getting his prescription for antibiotics before procedure    Can the clinic reply by [facility]? no    Would the patient rather a call back or a response via [facility]? Callback  Best call back number:[phone]

## 2020-09-02 ENCOUNTER — LAB VISIT (OUTPATIENT)
Dept: LAB | Facility: OTHER | Age: 65
End: 2020-09-02
Attending: UROLOGY
Payer: MEDICARE

## 2020-09-02 DIAGNOSIS — C61 PROSTATE CANCER: ICD-10-CM

## 2020-09-02 PROCEDURE — 87086 URINE CULTURE/COLONY COUNT: CPT

## 2020-09-03 LAB — BACTERIA UR CULT: NO GROWTH

## 2020-09-14 ENCOUNTER — TELEPHONE (OUTPATIENT)
Dept: UROLOGY | Facility: CLINIC | Age: 65
End: 2020-09-14

## 2020-09-14 NOTE — TELEPHONE ENCOUNTER
Informed patient's daughter that clinic would be open and that we would reach out should anything change.

## 2020-09-15 ENCOUNTER — PROCEDURE VISIT (OUTPATIENT)
Dept: UROLOGY | Facility: CLINIC | Age: 65
End: 2020-09-15
Attending: RADIOLOGY
Payer: MEDICARE

## 2020-09-15 ENCOUNTER — TELEPHONE (OUTPATIENT)
Dept: DERMATOLOGY | Facility: CLINIC | Age: 65
End: 2020-09-15

## 2020-09-15 VITALS
DIASTOLIC BLOOD PRESSURE: 89 MMHG | HEART RATE: 89 BPM | WEIGHT: 159.63 LBS | BODY MASS INDEX: 22.85 KG/M2 | SYSTOLIC BLOOD PRESSURE: 134 MMHG | HEIGHT: 70 IN

## 2020-09-15 DIAGNOSIS — C61 PROSTATE CANCER: Primary | ICD-10-CM

## 2020-09-15 LAB
BILIRUB SERPL-MCNC: NEGATIVE MG/DL
BLOOD URINE, POC: NEGATIVE
CLARITY, POC UA: CLEAR
COLOR, POC UA: YELLOW
GLUCOSE UR QL STRIP: 1000
KETONES UR QL STRIP: NEGATIVE
LEUKOCYTE ESTERASE URINE, POC: NEGATIVE
NITRITE, POC UA: NEGATIVE
PH, POC UA: 5
PROTEIN, POC: 30
SPECIFIC GRAVITY, POC UA: 1.01
UROBILINOGEN, POC UA: NEGATIVE

## 2020-09-15 PROCEDURE — 81002 URINALYSIS NONAUTO W/O SCOPE: CPT | Mod: S$GLB,,, | Performed by: UROLOGY

## 2020-09-15 PROCEDURE — 96372 PR INJECTION,THERAP/PROPH/DIAG2ST, IM OR SUBCUT: ICD-10-PCS | Mod: 59,S$GLB,, | Performed by: UROLOGY

## 2020-09-15 PROCEDURE — 55876 TRANSRECTAL ULTRASOUND W/ MARKER PLACEMENT: ICD-10-PCS | Mod: S$GLB,,, | Performed by: UROLOGY

## 2020-09-15 PROCEDURE — 55876 PLACE RT DEVICE/MARKER PROS: CPT | Mod: S$GLB,,, | Performed by: UROLOGY

## 2020-09-15 PROCEDURE — 76942 ECHO GUIDE FOR BIOPSY: CPT | Mod: 26,S$GLB,, | Performed by: UROLOGY

## 2020-09-15 PROCEDURE — A4648 PR TISSUE MARKER, IMPLANTABLE, ANY: ICD-10-PCS | Mod: S$GLB,,, | Performed by: UROLOGY

## 2020-09-15 PROCEDURE — 81002 POCT URINE DIPSTICK WITHOUT MICROSCOPE: ICD-10-PCS | Mod: S$GLB,,, | Performed by: UROLOGY

## 2020-09-15 PROCEDURE — 76942 TRANSRECTAL ULTRASOUND W/ MARKER PLACEMENT: ICD-10-PCS | Mod: 26,S$GLB,, | Performed by: UROLOGY

## 2020-09-15 PROCEDURE — 96372 THER/PROPH/DIAG INJ SC/IM: CPT | Mod: 59,S$GLB,, | Performed by: UROLOGY

## 2020-09-15 PROCEDURE — A4648 IMPLANTABLE TISSUE MARKER: HCPCS | Mod: S$GLB,,, | Performed by: UROLOGY

## 2020-09-15 RX ORDER — GENTAMICIN SULFATE 40 MG/ML
80 INJECTION, SOLUTION INTRAMUSCULAR; INTRAVENOUS
Status: COMPLETED | OUTPATIENT
Start: 2020-09-15 | End: 2020-09-15

## 2020-09-15 RX ORDER — LIDOCAINE HYDROCHLORIDE 10 MG/ML
1 INJECTION INFILTRATION; PERINEURAL
Status: COMPLETED | OUTPATIENT
Start: 2020-09-15 | End: 2020-09-15

## 2020-09-15 RX ORDER — LIDOCAINE HYDROCHLORIDE 20 MG/ML
JELLY TOPICAL
Status: COMPLETED | OUTPATIENT
Start: 2020-09-15 | End: 2020-09-15

## 2020-09-15 RX ADMIN — LIDOCAINE HYDROCHLORIDE: 20 JELLY TOPICAL at 09:09

## 2020-09-15 RX ADMIN — LIDOCAINE HYDROCHLORIDE 20 ML: 10 INJECTION INFILTRATION; PERINEURAL at 09:09

## 2020-09-15 RX ADMIN — GENTAMICIN SULFATE 80 MG: 40 INJECTION, SOLUTION INTRAMUSCULAR; INTRAVENOUS at 09:09

## 2020-09-15 NOTE — TELEPHONE ENCOUNTER
----- Message from Elba Keen sent at 9/15/2020  9:15 AM CDT -----  Contact: Claire (daughter) @ [phone]  Pt is scheduled for a NP appt on tomorrow.  Pt not longer has insurance and was advised to contact a .  She would like to make sure everything is ok before he takes the long trip on the bus.  Pls call asap.

## 2020-09-15 NOTE — PROCEDURES
"Transrectal ultrasound w/ marker placement    Date/Time: 9/15/2020 9:02 AM  Performed by: Jeff Avalos MD  Authorized by: Antonio Villalobos Jr., MD     Consent Done?:  Yes (Written)  Time out: Immediately prior to procedure a "time out" was called to verify the correct patient, procedure, equipment, support staff and site/side marked as required.    Anesthesia:  20 cc 1% Lidocaine  Preparation: Patient was prepped and draped in usual sterile fashion    Procedure Details: 1cm Gold seed placed at right base and second seed at left apex    Patient Tolerance:  Patient tolerated the procedure well with no immediate complications     The marker at the right base was difficult to visualize; therefore a 2nd gold marker was placed at the right base for a total of 3 markers      Return to see Chey Saini NP for Eligard 45 mg October 2020  Last dose was given April 29th 2020    Follow-up with Dr. Villalobos for simulation  Follow-up with Dr. Gomez, hematology oncology  "

## 2020-09-16 ENCOUNTER — OFFICE VISIT (OUTPATIENT)
Dept: DERMATOLOGY | Facility: CLINIC | Age: 65
End: 2020-09-16
Payer: MEDICARE

## 2020-09-16 ENCOUNTER — TELEPHONE (OUTPATIENT)
Dept: DERMATOLOGY | Facility: CLINIC | Age: 65
End: 2020-09-16

## 2020-09-16 VITALS — TEMPERATURE: 98 F

## 2020-09-16 DIAGNOSIS — L02.212 ABSCESS OF BACK: Primary | ICD-10-CM

## 2020-09-16 PROCEDURE — 10061 PR DRAIN SKIN ABSCESS COMPLIC: ICD-10-PCS | Mod: S$GLB,,, | Performed by: DERMATOLOGY

## 2020-09-16 PROCEDURE — 10061 I&D ABSCESS COMP/MULTIPLE: CPT | Mod: S$GLB,,, | Performed by: DERMATOLOGY

## 2020-09-16 PROCEDURE — 87070 CULTURE OTHR SPECIMN AEROBIC: CPT

## 2020-09-16 PROCEDURE — 99499 UNLISTED E&M SERVICE: CPT | Mod: S$GLB,,, | Performed by: DERMATOLOGY

## 2020-09-16 PROCEDURE — 99499 NO LOS: ICD-10-PCS | Mod: S$GLB,,, | Performed by: DERMATOLOGY

## 2020-09-16 RX ORDER — DOXYCYCLINE 100 MG/1
TABLET ORAL
Qty: 20 TABLET | Refills: 0 | Status: ON HOLD | OUTPATIENT
Start: 2020-09-16 | End: 2022-08-12 | Stop reason: CLARIF

## 2020-09-16 NOTE — PROGRESS NOTES
Subjective:       Patient ID:  Lan Robles is a 65 y.o. male who presents for   Chief Complaint   Patient presents with    Abscess     Abscess - Initial  Affected locations: back  Duration: 2 weeks  Signs / symptoms: draining, swelling, redness, inflamed and pain  Severity: moderate  Timing: constant  Aggravated by: pressure and friction  Treatments tried: OTC Boil-ease ointment and warm compresses.  Improvement on treatment: moderate      Review of Systems   Constitutional: Negative for fever and chills.   Respiratory: Negative for cough and shortness of breath.    Gastrointestinal: Negative for nausea.   Musculoskeletal: Negative for joint swelling and arthralgias.   Skin: Positive for dry lips and abscesses. Negative for itching and rash.   Hematologic/Lymphatic: Does not bruise/bleed easily.        Objective:    Physical Exam   Constitutional: He appears well-developed and well-nourished. No distress.   Neurological: He is alert and oriented to person, place, and time. He is not disoriented.   Psychiatric: He has a normal mood and affect.   Skin:   Areas Examined (abnormalities noted in diagram):   Back Inspection Performed              Diagram Legend     Erythematous scaling macule/papule c/w actinic keratosis       Vascular papule c/w angioma      Pigmented verrucoid papule/plaque c/w seborrheic keratosis      Yellow umbilicated papule c/w sebaceous hyperplasia      Irregularly shaped tan macule c/w lentigo     1-2 mm smooth white papules consistent with Milia      Movable subcutaneous cyst with punctum c/w epidermal inclusion cyst      Subcutaneous movable cyst c/w pilar cyst      Firm pink to brown papule c/w dermatofibroma      Pedunculated fleshy papule(s) c/w skin tag(s)      Evenly pigmented macule c/w junctional nevus     Mildly variegated pigmented, slightly irregular-bordered macule c/w mildly atypical nevus      Flesh colored to evenly pigmented papule c/w intradermal nevus       Pink pearly  papule/plaque c/w basal cell carcinoma      Erythematous hyperkeratotic cursted plaque c/w SCC      Surgical scar with no sign of skin cancer recurrence      Open and closed comedones      Inflammatory papules and pustules      Verrucoid papule consistent consistent with wart     Erythematous eczematous patches and plaques     Dystrophic onycholytic nail with subungual debris c/w onychomycosis     Umbilicated papule    Erythematous-base heme-crusted tan verrucoid plaque consistent with inflamed seborrheic keratosis     Erythematous Silvery Scaling Plaque c/w Psoriasis     See annotation      Assessment / Plan:        Abscess of back  -     doxycycline monohydrate 100 mg Tab; Take 1 po BID pc x 10 days. Take with food (no dairy) and with plenty water.  Dispense: 20 tablet; Refill: 0  -     Aerobic culture    Incision and drainage procedure note:  The site was cleaned with alcohol and anesthetized with 1% lidocaine with epinephrine. The site was incised with #15 blade, and the purulent material was drained along with blunt dissection of any loculated adhesions. Bacterial culture was performed on purulent drainage. The wound was flushed with saline, then packed with iodoform gauze. The wound was dressed with sterile gauze and tape. The patient tolerated the procedure well without complications.  Standard post-procedure care is explained and return precautions are given.    Discussed benefits and risks of doxycyline therapy including but not limited to GI discomfort, esophageal irritation/ulceration, and increased sun sensitivity. Patient was counseled to take medicine with meals and at least 1 hour before lying down.    Follow up in about 1 day (around 9/17/2020).

## 2020-09-16 NOTE — TELEPHONE ENCOUNTER
----- Message from Brii Bang sent at 9/16/2020 12:29 PM CDT -----  Regarding: Niko Olson  Pharmacy is calling to speak with the nurse they need to changed the pts prescription to the capsul's for Doxycycline instead can you please call pharmacy at [phone].    AYESHA

## 2020-09-17 ENCOUNTER — OFFICE VISIT (OUTPATIENT)
Dept: DERMATOLOGY | Facility: CLINIC | Age: 65
End: 2020-09-17
Payer: MEDICARE

## 2020-09-17 ENCOUNTER — HOSPITAL ENCOUNTER (OUTPATIENT)
Dept: RADIATION THERAPY | Facility: HOSPITAL | Age: 65
Discharge: HOME | End: 2020-09-17
Attending: RADIOLOGY
Payer: MEDICARE

## 2020-09-17 VITALS — TEMPERATURE: 98 F

## 2020-09-17 DIAGNOSIS — L02.212 ABSCESS OF BACK: Primary | ICD-10-CM

## 2020-09-17 PROCEDURE — 77290 THER RAD SIMULAJ FIELD CPLX: CPT | Mod: TC | Performed by: RADIOLOGY

## 2020-09-17 PROCEDURE — 77263 PR  RADIATION THERAPY PLAN COMPLEX: ICD-10-PCS | Mod: ,,, | Performed by: RADIOLOGY

## 2020-09-17 PROCEDURE — 99024 PR POST-OP FOLLOW-UP VISIT: ICD-10-PCS | Mod: S$GLB,,, | Performed by: DERMATOLOGY

## 2020-09-17 PROCEDURE — 77334 PR  RADN TREATMENT AID(S) COMPLX: ICD-10-PCS | Mod: 26,,, | Performed by: RADIOLOGY

## 2020-09-17 PROCEDURE — 99024 POSTOP FOLLOW-UP VISIT: CPT | Mod: S$GLB,,, | Performed by: DERMATOLOGY

## 2020-09-17 PROCEDURE — 77290 PR  SET RADN THERAPY FIELD COMPLEX: ICD-10-PCS | Mod: 26,,, | Performed by: RADIOLOGY

## 2020-09-17 PROCEDURE — 77334 RADIATION TREATMENT AID(S): CPT | Mod: TC | Performed by: RADIOLOGY

## 2020-09-17 PROCEDURE — 77290 THER RAD SIMULAJ FIELD CPLX: CPT | Mod: 26,,, | Performed by: RADIOLOGY

## 2020-09-17 PROCEDURE — 77014 HC CT GUIDANCE RADIATION THERAPY FLDS PLACEMENT: CPT | Mod: TC | Performed by: RADIOLOGY

## 2020-09-17 PROCEDURE — 77263 THER RADIOLOGY TX PLNG CPLX: CPT | Mod: ,,, | Performed by: RADIOLOGY

## 2020-09-17 PROCEDURE — 77334 RADIATION TREATMENT AID(S): CPT | Mod: 26,,, | Performed by: RADIOLOGY

## 2020-09-17 NOTE — PROGRESS NOTES
Subjective:       Patient ID:  Lan Robles is a 65 y.o. male who presents for   Chief Complaint   Patient presents with    Abscess     Abscess - Follow-up  Symptom course: improving  Currently using: doxy.  Affected locations: back  SIGNS AND SYMPTOMS F/U: no longer painful.  Severity: mild      Review of Systems   Constitutional: Positive for chills. Negative for fever.   Respiratory: Negative for cough and shortness of breath.    Musculoskeletal: Negative for joint swelling and arthralgias.   Skin: Positive for itching and abscesses. Negative for rash.        Objective:    Physical Exam   Constitutional: He appears well-developed and well-nourished. No distress.   Neurological: He is alert and oriented to person, place, and time. He is not disoriented.   Psychiatric: He has a normal mood and affect.   Skin:   Areas Examined (abnormalities noted in diagram):   Back Inspection Performed              Diagram Legend     Erythematous scaling macule/papule c/w actinic keratosis       Vascular papule c/w angioma      Pigmented verrucoid papule/plaque c/w seborrheic keratosis      Yellow umbilicated papule c/w sebaceous hyperplasia      Irregularly shaped tan macule c/w lentigo     1-2 mm smooth white papules consistent with Milia      Movable subcutaneous cyst with punctum c/w epidermal inclusion cyst      Subcutaneous movable cyst c/w pilar cyst      Firm pink to brown papule c/w dermatofibroma      Pedunculated fleshy papule(s) c/w skin tag(s)      Evenly pigmented macule c/w junctional nevus     Mildly variegated pigmented, slightly irregular-bordered macule c/w mildly atypical nevus      Flesh colored to evenly pigmented papule c/w intradermal nevus       Pink pearly papule/plaque c/w basal cell carcinoma      Erythematous hyperkeratotic cursted plaque c/w SCC      Surgical scar with no sign of skin cancer recurrence      Open and closed comedones      Inflammatory papules and pustules      Verrucoid papule  consistent consistent with wart     Erythematous eczematous patches and plaques     Dystrophic onycholytic nail with subungual debris c/w onychomycosis     Umbilicated papule    Erythematous-base heme-crusted tan verrucoid plaque consistent with inflamed seborrheic keratosis     Erythematous Silvery Scaling Plaque c/w Psoriasis     See annotation      Assessment / Plan:        Abscess of back    Packing removed, wound flushed with lidocaine, cleaned with Hibiclens, re-packed with iodoform gauze. The wound was dressed with sterile gauze and tape. The patient tolerated the procedure well without complications. Patient's daughter will remove packing in 2 days. Standard post-procedure care is explained and return precautions are given.     Continue doxycyline. Will notify patient of culture results and any change in treatment plan once the results have returned.    Follow up if symptoms worsen or fail to improve.

## 2020-09-19 LAB — BACTERIA SPEC AEROBE CULT: NO GROWTH

## 2020-09-21 ENCOUNTER — TELEPHONE (OUTPATIENT)
Dept: DERMATOLOGY | Facility: CLINIC | Age: 65
End: 2020-09-21

## 2020-09-21 NOTE — TELEPHONE ENCOUNTER
Called pt., he states he's doing fine and will call back if any issues arise.----- Message from Indigo Daley MD sent at 9/21/2020 12:08 PM CDT -----  Please call pt and make sure he is still improving.  KK

## 2020-09-23 PROCEDURE — 77301 PR  INTEN MOD RADIOTHER PLAN W/DOSE VOL HIST: ICD-10-PCS | Mod: 26,,, | Performed by: RADIOLOGY

## 2020-09-23 PROCEDURE — 77301 RADIOTHERAPY DOSE PLAN IMRT: CPT | Mod: TC | Performed by: RADIOLOGY

## 2020-09-23 PROCEDURE — 77301 RADIOTHERAPY DOSE PLAN IMRT: CPT | Mod: 26,,, | Performed by: RADIOLOGY

## 2020-09-24 PROCEDURE — 77338 DESIGN MLC DEVICE FOR IMRT: CPT | Mod: 26,,, | Performed by: RADIOLOGY

## 2020-09-24 PROCEDURE — 77300 PR RADIATION THERAPY,DOSIMETRY PLAN: ICD-10-PCS | Mod: 26,,, | Performed by: RADIOLOGY

## 2020-09-24 PROCEDURE — 77338 PR  MLC IMRT DESIGN & CONSTRUCTION PER IMRT PLAN: ICD-10-PCS | Mod: 26,,, | Performed by: RADIOLOGY

## 2020-09-24 PROCEDURE — 77338 DESIGN MLC DEVICE FOR IMRT: CPT | Mod: TC | Performed by: RADIOLOGY

## 2020-09-24 PROCEDURE — 77300 RADIATION THERAPY DOSE PLAN: CPT | Mod: 26,,, | Performed by: RADIOLOGY

## 2020-09-24 PROCEDURE — 77300 RADIATION THERAPY DOSE PLAN: CPT | Mod: TC | Performed by: RADIOLOGY

## 2020-09-28 ENCOUNTER — DOCUMENTATION ONLY (OUTPATIENT)
Dept: RADIATION ONCOLOGY | Facility: CLINIC | Age: 65
End: 2020-09-28

## 2020-09-28 NOTE — PROGRESS NOTES
Spoke with pts. Daughter re: transportation to Radiation appts. Per daughter she is able to bring on 9/28 and 9/29/20 but is unable to assist due to work for the remainder of pts. Treatments. Arranged for transportation via [employer] ([phone]) for  from 9/30 thru 11/4/20.  set for 9:15am with round trip requested. Pt. And daughter aware of the plan. No other needs identified.

## 2020-09-30 ENCOUNTER — DOCUMENTATION ONLY (OUTPATIENT)
Dept: RADIATION ONCOLOGY | Facility: CLINIC | Age: 65
End: 2020-09-30

## 2020-09-30 NOTE — PROGRESS NOTES
Received call from Margarita Milner MA, at [facility] [place] [facility], requesting confirmation that the cab transports were set up to bring patient to the [facility] and not [facility], as patient is confused as to which location. Called [employer] at [phone] and spoke with Ms. Carranza who confirmed that patient is being taken to the [facility]. Called Margarita back to let her know. She said that the electricity just went out at [facility] [place] and they will need to cancel his treatment appointment for today; he also was not treated on 9/28 and 9/29; he will start treatments tomorrow 10/1 and continue through 11/9. Provided Margarita with the phone numbers for patient and his daughter and she will contact them. Called [employer] back and was on hold for 20 minutes, during which time I sent a fax to Ms. Carranza at [phone] with request to cancel today's transport and to change his transport range to 10/1 thru 11/9 weekdays, 9:15 AM . By the time I was able to speak with Ms. Carranza she said that patient was already picked up and transported to [facility] [place]. Called Margarita and she confirmed that patient had arrived and would wait there for the machine to come back up as the electricity was back on. Asked that she let me know for sure if patient gets treated today so that Ms. Carranza can be updated to the end date of treatment/transport, and Margarita agreed.

## 2020-09-30 NOTE — PROGRESS NOTES
Received call from Margarita informing that patient unable to be treated at [facility] today as the machine went down again. They spoke with him about starting treatment at [facility] today but he prefers to be treated at the [facility]. Staff called [facility] to request his transport back home. Patient's treatments will be rescheduled to start on 10/1 and the end date will be 11/9. Called [facility] and spoke with Ms. Carranza re: this and she will note these dates on his referral and dispatch the cab accordingly.

## 2020-10-01 ENCOUNTER — APPOINTMENT (OUTPATIENT)
Dept: RADIATION THERAPY | Facility: OTHER | Age: 65
End: 2020-10-01
Attending: RADIOLOGY
Payer: MEDICARE

## 2020-10-01 ENCOUNTER — HOSPITAL ENCOUNTER (OUTPATIENT)
Dept: RADIATION THERAPY | Facility: HOSPITAL | Age: 65
Discharge: HOME | End: 2020-10-01
Attending: RADIOLOGY
Payer: MEDICARE

## 2020-10-01 PROCEDURE — 77014 PR  CT GUIDANCE PLACEMENT RAD THERAPY FIELDS: CPT | Mod: 26,,, | Performed by: RADIOLOGY

## 2020-10-01 PROCEDURE — 77014 HC CT GUIDANCE RADIATION THERAPY FLDS PLACEMENT: CPT | Mod: TC | Performed by: RADIOLOGY

## 2020-10-01 PROCEDURE — 77385 HC IMRT, SIMPLE: CPT | Performed by: RADIOLOGY

## 2020-10-01 PROCEDURE — 77014 PR  CT GUIDANCE PLACEMENT RAD THERAPY FIELDS: ICD-10-PCS | Mod: 26,,, | Performed by: RADIOLOGY

## 2020-10-02 PROCEDURE — 77385 HC IMRT, SIMPLE: CPT | Performed by: RADIOLOGY

## 2020-10-02 PROCEDURE — 77014 PR  CT GUIDANCE PLACEMENT RAD THERAPY FIELDS: CPT | Mod: 26,,, | Performed by: RADIOLOGY

## 2020-10-02 PROCEDURE — 77014 HC CT GUIDANCE RADIATION THERAPY FLDS PLACEMENT: CPT | Mod: TC | Performed by: RADIOLOGY

## 2020-10-02 PROCEDURE — 77014 PR  CT GUIDANCE PLACEMENT RAD THERAPY FIELDS: ICD-10-PCS | Mod: 26,,, | Performed by: RADIOLOGY

## 2020-10-05 PROCEDURE — 77385 HC IMRT, SIMPLE: CPT | Performed by: RADIOLOGY

## 2020-10-05 PROCEDURE — 77014 HC CT GUIDANCE RADIATION THERAPY FLDS PLACEMENT: CPT | Mod: TC | Performed by: RADIOLOGY

## 2020-10-05 PROCEDURE — 77014 PR  CT GUIDANCE PLACEMENT RAD THERAPY FIELDS: ICD-10-PCS | Mod: 26,,, | Performed by: RADIOLOGY

## 2020-10-05 PROCEDURE — 77014 PR  CT GUIDANCE PLACEMENT RAD THERAPY FIELDS: CPT | Mod: 26,,, | Performed by: RADIOLOGY

## 2020-10-06 PROCEDURE — 77014 PR  CT GUIDANCE PLACEMENT RAD THERAPY FIELDS: ICD-10-PCS | Mod: 26,,, | Performed by: RADIOLOGY

## 2020-10-06 PROCEDURE — 77385 HC IMRT, SIMPLE: CPT | Performed by: RADIOLOGY

## 2020-10-06 PROCEDURE — 77014 PR  CT GUIDANCE PLACEMENT RAD THERAPY FIELDS: CPT | Mod: 26,,, | Performed by: RADIOLOGY

## 2020-10-06 PROCEDURE — 77014 HC CT GUIDANCE RADIATION THERAPY FLDS PLACEMENT: CPT | Mod: TC | Performed by: RADIOLOGY

## 2020-10-07 PROCEDURE — 77385 HC IMRT, SIMPLE: CPT | Performed by: RADIOLOGY

## 2020-10-07 PROCEDURE — 77014 PR  CT GUIDANCE PLACEMENT RAD THERAPY FIELDS: CPT | Mod: 26,,, | Performed by: RADIOLOGY

## 2020-10-07 PROCEDURE — 77014 PR  CT GUIDANCE PLACEMENT RAD THERAPY FIELDS: ICD-10-PCS | Mod: 26,,, | Performed by: RADIOLOGY

## 2020-10-07 PROCEDURE — 77014 HC CT GUIDANCE RADIATION THERAPY FLDS PLACEMENT: CPT | Mod: TC | Performed by: RADIOLOGY

## 2020-10-08 PROCEDURE — G6002 PR STEREOSCOPIC XRAY GUIDE FOR RADIATION TX DELIV: ICD-10-PCS | Mod: 26,,, | Performed by: RADIOLOGY

## 2020-10-08 PROCEDURE — 77336 RADIATION PHYSICS CONSULT: CPT | Performed by: RADIOLOGY

## 2020-10-08 PROCEDURE — 77385 HC IMRT, SIMPLE: CPT | Performed by: RADIOLOGY

## 2020-10-08 PROCEDURE — G6002 STEREOSCOPIC X-RAY GUIDANCE: HCPCS | Mod: 26,,, | Performed by: RADIOLOGY

## 2020-10-08 PROCEDURE — 77417 THER RADIOLOGY PORT IMAGE(S): CPT | Performed by: RADIOLOGY

## 2020-10-09 ENCOUNTER — DOCUMENTATION ONLY (OUTPATIENT)
Dept: RADIATION ONCOLOGY | Facility: CLINIC | Age: 65
End: 2020-10-09

## 2020-10-09 PROCEDURE — G6002 PR STEREOSCOPIC XRAY GUIDE FOR RADIATION TX DELIV: ICD-10-PCS | Mod: 26,,, | Performed by: RADIOLOGY

## 2020-10-09 PROCEDURE — 77385 HC IMRT, SIMPLE: CPT | Performed by: RADIOLOGY

## 2020-10-09 PROCEDURE — G6002 STEREOSCOPIC X-RAY GUIDANCE: HCPCS | Mod: 26,,, | Performed by: RADIOLOGY

## 2020-10-12 PROCEDURE — 77014 HC CT GUIDANCE RADIATION THERAPY FLDS PLACEMENT: CPT | Mod: TC | Performed by: RADIOLOGY

## 2020-10-12 PROCEDURE — 77014 PR  CT GUIDANCE PLACEMENT RAD THERAPY FIELDS: ICD-10-PCS | Mod: 26,,, | Performed by: RADIOLOGY

## 2020-10-12 PROCEDURE — 77014 PR  CT GUIDANCE PLACEMENT RAD THERAPY FIELDS: CPT | Mod: 26,,, | Performed by: RADIOLOGY

## 2020-10-12 PROCEDURE — 77385 HC IMRT, SIMPLE: CPT | Performed by: RADIOLOGY

## 2020-10-13 PROCEDURE — G6002 PR STEREOSCOPIC XRAY GUIDE FOR RADIATION TX DELIV: ICD-10-PCS | Mod: 26,,, | Performed by: RADIOLOGY

## 2020-10-13 PROCEDURE — 77385 HC IMRT, SIMPLE: CPT | Performed by: RADIOLOGY

## 2020-10-13 PROCEDURE — G6002 STEREOSCOPIC X-RAY GUIDANCE: HCPCS | Mod: 26,,, | Performed by: RADIOLOGY

## 2020-10-14 ENCOUNTER — DOCUMENTATION ONLY (OUTPATIENT)
Dept: RADIATION ONCOLOGY | Facility: CLINIC | Age: 65
End: 2020-10-14

## 2020-10-14 PROCEDURE — 77336 RADIATION PHYSICS CONSULT: CPT | Performed by: RADIOLOGY

## 2020-10-14 PROCEDURE — 77014 PR  CT GUIDANCE PLACEMENT RAD THERAPY FIELDS: CPT | Mod: 26,,, | Performed by: RADIOLOGY

## 2020-10-14 PROCEDURE — 77386 HC IMRT, COMPLEX: CPT | Performed by: RADIOLOGY

## 2020-10-14 PROCEDURE — 77385 HC IMRT, SIMPLE: CPT | Performed by: RADIOLOGY

## 2020-10-14 PROCEDURE — 77014 HC CT GUIDANCE RADIATION THERAPY FLDS PLACEMENT: CPT | Mod: TC | Performed by: RADIOLOGY

## 2020-10-14 PROCEDURE — 77014 PR  CT GUIDANCE PLACEMENT RAD THERAPY FIELDS: ICD-10-PCS | Mod: 26,,, | Performed by: RADIOLOGY

## 2020-10-15 PROCEDURE — G6002 PR STEREOSCOPIC XRAY GUIDE FOR RADIATION TX DELIV: ICD-10-PCS | Mod: 26,,, | Performed by: RADIOLOGY

## 2020-10-15 PROCEDURE — G6002 STEREOSCOPIC X-RAY GUIDANCE: HCPCS | Mod: 26,,, | Performed by: RADIOLOGY

## 2020-10-15 PROCEDURE — 77385 HC IMRT, SIMPLE: CPT | Performed by: RADIOLOGY

## 2020-10-16 ENCOUNTER — DOCUMENTATION ONLY (OUTPATIENT)
Dept: RADIATION ONCOLOGY | Facility: CLINIC | Age: 65
End: 2020-10-16

## 2020-10-16 PROCEDURE — 77014 PR  CT GUIDANCE PLACEMENT RAD THERAPY FIELDS: CPT | Mod: 26,,, | Performed by: RADIOLOGY

## 2020-10-16 PROCEDURE — 77014 HC CT GUIDANCE RADIATION THERAPY FLDS PLACEMENT: CPT | Mod: TC | Performed by: RADIOLOGY

## 2020-10-16 PROCEDURE — 77014 PR  CT GUIDANCE PLACEMENT RAD THERAPY FIELDS: ICD-10-PCS | Mod: 26,,, | Performed by: RADIOLOGY

## 2020-10-16 PROCEDURE — 77385 HC IMRT, SIMPLE: CPT | Performed by: RADIOLOGY

## 2020-10-16 NOTE — PROGRESS NOTES
Received message from Margarita Milner MA, with [facility]cology, wanting to confirm that patient's cab transportation was arranged through 11/9 for his radiation treatments. This was arranged previously (see my note dated 9/30). Called [facility] at ([phone] this afternoon and spoke with Dedra who checked with Ms. Carranza and confirmed that they have his transports scheduled through 11/9.  Sent reply message to Margarita. No other needs indicated at this time.

## 2020-10-19 PROCEDURE — G6002 PR STEREOSCOPIC XRAY GUIDE FOR RADIATION TX DELIV: ICD-10-PCS | Mod: 26,,, | Performed by: RADIOLOGY

## 2020-10-19 PROCEDURE — 77385 HC IMRT, SIMPLE: CPT | Performed by: RADIOLOGY

## 2020-10-19 PROCEDURE — G6002 STEREOSCOPIC X-RAY GUIDANCE: HCPCS | Mod: 26,,, | Performed by: RADIOLOGY

## 2020-10-20 PROCEDURE — 77385 HC IMRT, SIMPLE: CPT | Performed by: RADIOLOGY

## 2020-10-20 PROCEDURE — G6002 PR STEREOSCOPIC XRAY GUIDE FOR RADIATION TX DELIV: ICD-10-PCS | Mod: 26,,, | Performed by: RADIOLOGY

## 2020-10-20 PROCEDURE — G6002 STEREOSCOPIC X-RAY GUIDANCE: HCPCS | Mod: 26,,, | Performed by: RADIOLOGY

## 2020-10-21 PROCEDURE — 77385 HC IMRT, SIMPLE: CPT | Performed by: RADIOLOGY

## 2020-10-21 PROCEDURE — G6002 PR STEREOSCOPIC XRAY GUIDE FOR RADIATION TX DELIV: ICD-10-PCS | Mod: 26,,, | Performed by: RADIOLOGY

## 2020-10-21 PROCEDURE — G6002 STEREOSCOPIC X-RAY GUIDANCE: HCPCS | Mod: 26,,, | Performed by: RADIOLOGY

## 2020-10-22 ENCOUNTER — TELEPHONE (OUTPATIENT)
Dept: UROLOGY | Facility: CLINIC | Age: 65
End: 2020-10-22

## 2020-10-22 ENCOUNTER — LAB VISIT (OUTPATIENT)
Dept: LAB | Facility: OTHER | Age: 65
End: 2020-10-22
Attending: UROLOGY
Payer: MEDICARE

## 2020-10-22 ENCOUNTER — PATIENT MESSAGE (OUTPATIENT)
Dept: RADIATION ONCOLOGY | Facility: CLINIC | Age: 65
End: 2020-10-22

## 2020-10-22 DIAGNOSIS — C61 PROSTATE CANCER: ICD-10-CM

## 2020-10-22 LAB
ALBUMIN SERPL BCP-MCNC: 3.5 G/DL (ref 3.5–5.2)
ALP SERPL-CCNC: 107 U/L (ref 55–135)
ALT SERPL W/O P-5'-P-CCNC: 26 U/L (ref 10–44)
ANION GAP SERPL CALC-SCNC: 13 MMOL/L (ref 8–16)
AST SERPL-CCNC: 14 U/L (ref 10–40)
BILIRUB SERPL-MCNC: 0.4 MG/DL (ref 0.1–1)
BUN SERPL-MCNC: 11 MG/DL (ref 8–23)
CALCIUM SERPL-MCNC: 10 MG/DL (ref 8.7–10.5)
CHLORIDE SERPL-SCNC: 95 MMOL/L (ref 95–110)
CO2 SERPL-SCNC: 26 MMOL/L (ref 23–29)
COMPLEXED PSA SERPL-MCNC: 4.6 NG/ML (ref 0–4)
CREAT SERPL-MCNC: 1.4 MG/DL (ref 0.5–1.4)
EST. GFR  (AFRICAN AMERICAN): >60 ML/MIN/1.73 M^2
EST. GFR  (NON AFRICAN AMERICAN): 52 ML/MIN/1.73 M^2
GLUCOSE SERPL-MCNC: 641 MG/DL (ref 70–110)
POTASSIUM SERPL-SCNC: 4 MMOL/L (ref 3.5–5.1)
PROT SERPL-MCNC: 7.8 G/DL (ref 6–8.4)
SODIUM SERPL-SCNC: 134 MMOL/L (ref 136–145)
TESTOST SERPL-MCNC: 19 NG/DL (ref 304–1227)

## 2020-10-22 PROCEDURE — 77014 HC CT GUIDANCE RADIATION THERAPY FLDS PLACEMENT: CPT | Mod: TC | Performed by: RADIOLOGY

## 2020-10-22 PROCEDURE — 84153 ASSAY OF PSA TOTAL: CPT

## 2020-10-22 PROCEDURE — 80053 COMPREHEN METABOLIC PANEL: CPT

## 2020-10-22 PROCEDURE — 36415 COLL VENOUS BLD VENIPUNCTURE: CPT

## 2020-10-22 PROCEDURE — 77014 PR  CT GUIDANCE PLACEMENT RAD THERAPY FIELDS: ICD-10-PCS | Mod: 26,,, | Performed by: RADIOLOGY

## 2020-10-22 PROCEDURE — 77385 HC IMRT, SIMPLE: CPT | Performed by: RADIOLOGY

## 2020-10-22 PROCEDURE — 84403 ASSAY OF TOTAL TESTOSTERONE: CPT

## 2020-10-22 PROCEDURE — 77014 PR  CT GUIDANCE PLACEMENT RAD THERAPY FIELDS: CPT | Mod: 26,,, | Performed by: RADIOLOGY

## 2020-10-22 NOTE — TELEPHONE ENCOUNTER
Rafael Grace from [facility] called w/critical Glucose result of 641. Discussed w/MD- patient should proceed to ER.    LVM on mobile number. Was able to reach patient on home number. Reviewed results and instructed patient to proceed to ER. Informed patient that glucose was dangerously high and stressed importance of emergent care. Patient expressed understanding and will call daughter to bring him to ER now.

## 2020-10-22 NOTE — TELEPHONE ENCOUNTER
Patient's daughter called asking why we had told her father to go to ED. Explained that patient had a very high glucose level per documented Critical lab value and telephone encounter between Dr. Avalos and RN. Daughter insistent that it was just an average value and only due to father not taking Metformin. Explained same information as RN/MD had documented; Critical value that he needed to go to ED for evaluation of. Daughter verbalized that father on his way via private transportation.

## 2020-10-23 ENCOUNTER — DOCUMENTATION ONLY (OUTPATIENT)
Dept: RADIATION ONCOLOGY | Facility: CLINIC | Age: 65
End: 2020-10-23

## 2020-10-23 ENCOUNTER — TELEPHONE (OUTPATIENT)
Dept: UROLOGY | Facility: CLINIC | Age: 65
End: 2020-10-23

## 2020-10-23 PROCEDURE — G6002 PR STEREOSCOPIC XRAY GUIDE FOR RADIATION TX DELIV: ICD-10-PCS | Mod: 26,,, | Performed by: RADIOLOGY

## 2020-10-23 PROCEDURE — 77385 HC IMRT, SIMPLE: CPT | Performed by: RADIOLOGY

## 2020-10-23 PROCEDURE — G6002 STEREOSCOPIC X-RAY GUIDANCE: HCPCS | Mod: 26,,, | Performed by: RADIOLOGY

## 2020-10-23 NOTE — TELEPHONE ENCOUNTER
Patient's daughter called w/questions about a call from [facility] about insulin. Directed to PCP.    Also states patient never went to ER yesterday. Stressed importance and possible complications of critical blood sugar. Patient's daughter states she will call patient to encourage him to go after appt. Discussed w/Mello scott/Dr. Villalobos- she will also stress importance of glucose follow-up and will contact me with any questions.

## 2020-10-24 PROCEDURE — 77336 RADIATION PHYSICS CONSULT: CPT | Performed by: RADIOLOGY

## 2020-10-26 PROCEDURE — G6002 PR STEREOSCOPIC XRAY GUIDE FOR RADIATION TX DELIV: ICD-10-PCS | Mod: 26,,, | Performed by: RADIOLOGY

## 2020-10-26 PROCEDURE — 77385 HC IMRT, SIMPLE: CPT | Performed by: RADIOLOGY

## 2020-10-26 PROCEDURE — G6002 STEREOSCOPIC X-RAY GUIDANCE: HCPCS | Mod: 26,,, | Performed by: RADIOLOGY

## 2020-10-26 PROCEDURE — 77417 THER RADIOLOGY PORT IMAGE(S): CPT | Performed by: RADIOLOGY

## 2020-10-27 PROCEDURE — 77014 PR  CT GUIDANCE PLACEMENT RAD THERAPY FIELDS: CPT | Mod: 26,,, | Performed by: RADIOLOGY

## 2020-10-27 PROCEDURE — 77014 PR  CT GUIDANCE PLACEMENT RAD THERAPY FIELDS: ICD-10-PCS | Mod: 26,,, | Performed by: RADIOLOGY

## 2020-10-27 PROCEDURE — 77385 HC IMRT, SIMPLE: CPT | Performed by: RADIOLOGY

## 2020-10-27 PROCEDURE — 77014 HC CT GUIDANCE RADIATION THERAPY FLDS PLACEMENT: CPT | Mod: TC | Performed by: RADIOLOGY

## 2020-10-28 ENCOUNTER — DOCUMENTATION ONLY (OUTPATIENT)
Dept: RADIATION ONCOLOGY | Facility: CLINIC | Age: 65
End: 2020-10-28

## 2020-10-28 PROCEDURE — G6002 PR STEREOSCOPIC XRAY GUIDE FOR RADIATION TX DELIV: ICD-10-PCS | Mod: 26,,, | Performed by: RADIOLOGY

## 2020-10-28 PROCEDURE — 77385 HC IMRT, SIMPLE: CPT | Performed by: RADIOLOGY

## 2020-10-28 PROCEDURE — G6002 STEREOSCOPIC X-RAY GUIDANCE: HCPCS | Mod: 26,,, | Performed by: RADIOLOGY

## 2020-10-29 ENCOUNTER — TELEPHONE (OUTPATIENT)
Dept: UROLOGY | Facility: CLINIC | Age: 65
End: 2020-10-29

## 2020-10-29 NOTE — PLAN OF CARE
Pt. On day 20 of outpt. Xrt to prostate.  Overall doing well.  No dysuria or hematuria.  Active diarrhea-encouraged pt. To use imodium AD.

## 2020-10-29 NOTE — TELEPHONE ENCOUNTER
----- Message from Shilpi Garza MA sent at 10/29/2020 11:27 AM CDT -----  PT is requesting a call back in regards to his taxi ride to his appt  Call back # [phone]

## 2020-10-29 NOTE — TELEPHONE ENCOUNTER
Called and spoke with patient.  Informed him that his appointment is scheduled for 9  He is trying to get the cab service for 830.  He statedthat he will call if he is unable to make that time.

## 2020-10-30 ENCOUNTER — TELEPHONE (OUTPATIENT)
Dept: UROLOGY | Facility: CLINIC | Age: 65
End: 2020-10-30

## 2020-10-30 PROCEDURE — 77014 PR  CT GUIDANCE PLACEMENT RAD THERAPY FIELDS: ICD-10-PCS | Mod: 26,,, | Performed by: RADIOLOGY

## 2020-10-30 PROCEDURE — 77385 HC IMRT, SIMPLE: CPT | Performed by: RADIOLOGY

## 2020-10-30 PROCEDURE — 77014 HC CT GUIDANCE RADIATION THERAPY FLDS PLACEMENT: CPT | Mod: TC | Performed by: RADIOLOGY

## 2020-10-30 PROCEDURE — 77014 PR  CT GUIDANCE PLACEMENT RAD THERAPY FIELDS: CPT | Mod: 26,,, | Performed by: RADIOLOGY

## 2020-10-30 NOTE — TELEPHONE ENCOUNTER
This patient is not yet approved for his Eligard shot. Chey spoke to his daughter at length earlier but seems to be having trouble understanding the approval process and that the office does not approve/deny authorizations. Would you be able to speak to her?  ----- Message from Lee Ann Armenta sent at 10/30/2020  9:08 AM CDT -----  Regarding: Patient Call Back  Who Called: Claire Robles (Daughter)     What is the request in detail: Would like a call back in regards to speak with staff states she spoke with financial assistance Rohini and her dad is approved for financial assistance and Rohini advised her to call and inform staff. She would like to see if it is possible for her father to have his injection today or with in a week states her father was disappointed about not being able to have his shot so she would like to speak with staff.    Can the clinic reply by  [facility]?No     Best Call Back Number:[phone]

## 2020-11-02 ENCOUNTER — APPOINTMENT (OUTPATIENT)
Dept: RADIATION THERAPY | Facility: OTHER | Age: 65
End: 2020-11-02
Attending: RADIOLOGY
Payer: MEDICARE

## 2020-11-02 PROCEDURE — 77385 HC IMRT, SIMPLE: CPT | Performed by: RADIOLOGY

## 2020-11-02 PROCEDURE — G6002 PR STEREOSCOPIC XRAY GUIDE FOR RADIATION TX DELIV: ICD-10-PCS | Mod: 26,,, | Performed by: RADIOLOGY

## 2020-11-02 PROCEDURE — G6002 STEREOSCOPIC X-RAY GUIDANCE: HCPCS | Mod: 26,,, | Performed by: RADIOLOGY

## 2020-11-03 PROCEDURE — 77014 HC CT GUIDANCE RADIATION THERAPY FLDS PLACEMENT: CPT | Mod: TC | Performed by: RADIOLOGY

## 2020-11-03 PROCEDURE — 77014 PR  CT GUIDANCE PLACEMENT RAD THERAPY FIELDS: CPT | Mod: 26,,, | Performed by: RADIOLOGY

## 2020-11-03 PROCEDURE — 77014 PR  CT GUIDANCE PLACEMENT RAD THERAPY FIELDS: ICD-10-PCS | Mod: 26,,, | Performed by: RADIOLOGY

## 2020-11-03 PROCEDURE — 77385 HC IMRT, SIMPLE: CPT | Performed by: RADIOLOGY

## 2020-11-03 PROCEDURE — 77336 RADIATION PHYSICS CONSULT: CPT | Performed by: RADIOLOGY

## 2020-11-04 ENCOUNTER — DOCUMENTATION ONLY (OUTPATIENT)
Dept: RADIATION ONCOLOGY | Facility: CLINIC | Age: 65
End: 2020-11-04

## 2020-11-04 PROCEDURE — G6002 STEREOSCOPIC X-RAY GUIDANCE: HCPCS | Mod: 26,,, | Performed by: RADIOLOGY

## 2020-11-04 PROCEDURE — 77417 THER RADIOLOGY PORT IMAGE(S): CPT | Performed by: RADIOLOGY

## 2020-11-04 PROCEDURE — G6002 PR STEREOSCOPIC XRAY GUIDE FOR RADIATION TX DELIV: ICD-10-PCS | Mod: 26,,, | Performed by: RADIOLOGY

## 2020-11-04 PROCEDURE — 77385 HC IMRT, SIMPLE: CPT | Performed by: RADIOLOGY

## 2020-11-04 NOTE — PROGRESS NOTES
Received call from Margarita Milner MA, with Radiation Oncology asking if patient's cab transportation can be extended to 11/10, which will be patient's last treatment date. Called [facility], [phone], and spoke with Ms. Carranza, who noted it on patient's form and she will schedule the transports; she said that I do not need to fax a new form in. Called patient at [phone] and discussed with him. Patient expressed understanding and appreciation.

## 2020-11-05 PROCEDURE — 77385 HC IMRT, SIMPLE: CPT | Performed by: RADIOLOGY

## 2020-11-05 PROCEDURE — 77014 PR  CT GUIDANCE PLACEMENT RAD THERAPY FIELDS: ICD-10-PCS | Mod: 26,,, | Performed by: RADIOLOGY

## 2020-11-05 PROCEDURE — 77014 HC CT GUIDANCE RADIATION THERAPY FLDS PLACEMENT: CPT | Mod: TC | Performed by: RADIOLOGY

## 2020-11-05 PROCEDURE — 77014 PR  CT GUIDANCE PLACEMENT RAD THERAPY FIELDS: CPT | Mod: 26,,, | Performed by: RADIOLOGY

## 2020-11-06 PROCEDURE — 77385 HC IMRT, SIMPLE: CPT | Performed by: RADIOLOGY

## 2020-11-06 PROCEDURE — G6002 PR STEREOSCOPIC XRAY GUIDE FOR RADIATION TX DELIV: ICD-10-PCS | Mod: 26,,, | Performed by: RADIOLOGY

## 2020-11-06 PROCEDURE — G6002 STEREOSCOPIC X-RAY GUIDANCE: HCPCS | Mod: 26,,, | Performed by: RADIOLOGY

## 2020-11-09 PROCEDURE — G6002 PR STEREOSCOPIC XRAY GUIDE FOR RADIATION TX DELIV: ICD-10-PCS | Mod: 26,,, | Performed by: RADIOLOGY

## 2020-11-09 PROCEDURE — 77385 HC IMRT, SIMPLE: CPT | Performed by: RADIOLOGY

## 2020-11-09 PROCEDURE — G6002 STEREOSCOPIC X-RAY GUIDANCE: HCPCS | Mod: 26,,, | Performed by: RADIOLOGY

## 2020-11-10 ENCOUNTER — DOCUMENTATION ONLY (OUTPATIENT)
Dept: RADIATION ONCOLOGY | Facility: CLINIC | Age: 65
End: 2020-11-10

## 2020-11-10 PROCEDURE — G6002 STEREOSCOPIC X-RAY GUIDANCE: HCPCS | Mod: 26,,, | Performed by: RADIOLOGY

## 2020-11-10 PROCEDURE — G6002 PR STEREOSCOPIC XRAY GUIDE FOR RADIATION TX DELIV: ICD-10-PCS | Mod: 26,,, | Performed by: RADIOLOGY

## 2020-11-10 PROCEDURE — 77385 HC IMRT, SIMPLE: CPT | Performed by: RADIOLOGY

## 2020-11-12 PROCEDURE — 77336 RADIATION PHYSICS CONSULT: CPT | Performed by: RADIOLOGY

## 2020-11-17 DIAGNOSIS — C61 PROSTATE CANCER: Primary | ICD-10-CM

## 2020-12-08 ENCOUNTER — TELEPHONE (OUTPATIENT)
Dept: RADIATION ONCOLOGY | Facility: CLINIC | Age: 65
End: 2020-12-08

## 2020-12-08 NOTE — TELEPHONE ENCOUNTER
Phone review s/p xrt; pt states he is doing fine. Denies pain. No dysuria or hematuria. No diarrhea. Confirmed follow-up appointments.

## 2020-12-16 ENCOUNTER — LAB VISIT (OUTPATIENT)
Dept: LAB | Facility: OTHER | Age: 65
End: 2020-12-16
Attending: INTERNAL MEDICINE
Payer: MEDICARE

## 2020-12-16 DIAGNOSIS — C61 PROSTATE CANCER: ICD-10-CM

## 2020-12-16 LAB
ALBUMIN SERPL BCP-MCNC: 3.5 G/DL (ref 3.5–5.2)
ALP SERPL-CCNC: 97 U/L (ref 55–135)
ALT SERPL W/O P-5'-P-CCNC: 19 U/L (ref 10–44)
ANION GAP SERPL CALC-SCNC: 10 MMOL/L (ref 8–16)
AST SERPL-CCNC: 15 U/L (ref 10–40)
BASOPHILS # BLD AUTO: 0.04 K/UL (ref 0–0.2)
BASOPHILS NFR BLD: 0.6 % (ref 0–1.9)
BILIRUB SERPL-MCNC: 0.3 MG/DL (ref 0.1–1)
BUN SERPL-MCNC: 10 MG/DL (ref 8–23)
CALCIUM SERPL-MCNC: 10 MG/DL (ref 8.7–10.5)
CHLORIDE SERPL-SCNC: 103 MMOL/L (ref 95–110)
CO2 SERPL-SCNC: 27 MMOL/L (ref 23–29)
COMPLEXED PSA SERPL-MCNC: 0.21 NG/ML (ref 0–4)
CREAT SERPL-MCNC: 1.1 MG/DL (ref 0.5–1.4)
DIFFERENTIAL METHOD: ABNORMAL
EOSINOPHIL # BLD AUTO: 0.3 K/UL (ref 0–0.5)
EOSINOPHIL NFR BLD: 4.8 % (ref 0–8)
ERYTHROCYTE [DISTWIDTH] IN BLOOD BY AUTOMATED COUNT: 13.9 % (ref 11.5–14.5)
EST. GFR  (AFRICAN AMERICAN): >60 ML/MIN/1.73 M^2
EST. GFR  (NON AFRICAN AMERICAN): >60 ML/MIN/1.73 M^2
GLUCOSE SERPL-MCNC: 182 MG/DL (ref 70–110)
HCT VFR BLD AUTO: 39.9 % (ref 40–54)
HGB BLD-MCNC: 13 G/DL (ref 14–18)
IMM GRANULOCYTES # BLD AUTO: 0.05 K/UL (ref 0–0.04)
IMM GRANULOCYTES NFR BLD AUTO: 0.8 % (ref 0–0.5)
LYMPHOCYTES # BLD AUTO: 0.9 K/UL (ref 1–4.8)
LYMPHOCYTES NFR BLD: 15 % (ref 18–48)
MCH RBC QN AUTO: 28.2 PG (ref 27–31)
MCHC RBC AUTO-ENTMCNC: 32.6 G/DL (ref 32–36)
MCV RBC AUTO: 87 FL (ref 82–98)
MONOCYTES # BLD AUTO: 0.6 K/UL (ref 0.3–1)
MONOCYTES NFR BLD: 9.7 % (ref 4–15)
NEUTROPHILS # BLD AUTO: 4.3 K/UL (ref 1.8–7.7)
NEUTROPHILS NFR BLD: 69.1 % (ref 38–73)
NRBC BLD-RTO: 0 /100 WBC
PLATELET # BLD AUTO: 280 K/UL (ref 150–350)
PMV BLD AUTO: 9.6 FL (ref 9.2–12.9)
POTASSIUM SERPL-SCNC: 3.8 MMOL/L (ref 3.5–5.1)
PROT SERPL-MCNC: 7.4 G/DL (ref 6–8.4)
RBC # BLD AUTO: 4.61 M/UL (ref 4.6–6.2)
SODIUM SERPL-SCNC: 140 MMOL/L (ref 136–145)
TESTOST SERPL-MCNC: 16 NG/DL (ref 304–1227)
WBC # BLD AUTO: 6.2 K/UL (ref 3.9–12.7)

## 2020-12-16 PROCEDURE — 85025 COMPLETE CBC W/AUTO DIFF WBC: CPT

## 2020-12-16 PROCEDURE — 84403 ASSAY OF TOTAL TESTOSTERONE: CPT

## 2020-12-16 PROCEDURE — 80053 COMPREHEN METABOLIC PANEL: CPT

## 2020-12-16 PROCEDURE — 36415 COLL VENOUS BLD VENIPUNCTURE: CPT

## 2020-12-16 PROCEDURE — 84153 ASSAY OF PSA TOTAL: CPT

## 2020-12-18 ENCOUNTER — OFFICE VISIT (OUTPATIENT)
Dept: HEMATOLOGY/ONCOLOGY | Facility: CLINIC | Age: 65
End: 2020-12-18
Payer: MEDICARE

## 2020-12-18 VITALS
RESPIRATION RATE: 16 BRPM | HEART RATE: 82 BPM | SYSTOLIC BLOOD PRESSURE: 121 MMHG | OXYGEN SATURATION: 100 % | WEIGHT: 154.31 LBS | BODY MASS INDEX: 22.09 KG/M2 | TEMPERATURE: 98 F | DIASTOLIC BLOOD PRESSURE: 79 MMHG | HEIGHT: 70 IN

## 2020-12-18 DIAGNOSIS — N28.9 KIDNEY LESION: ICD-10-CM

## 2020-12-18 DIAGNOSIS — C61 PROSTATE CANCER: Primary | ICD-10-CM

## 2020-12-18 PROCEDURE — 1159F PR MEDICATION LIST DOCUMENTED IN MEDICAL RECORD: ICD-10-PCS | Mod: S$GLB,,, | Performed by: INTERNAL MEDICINE

## 2020-12-18 PROCEDURE — 3008F BODY MASS INDEX DOCD: CPT | Mod: CPTII,S$GLB,, | Performed by: INTERNAL MEDICINE

## 2020-12-18 PROCEDURE — 1101F PR PT FALLS ASSESS DOC 0-1 FALLS W/OUT INJ PAST YR: ICD-10-PCS | Mod: CPTII,S$GLB,, | Performed by: INTERNAL MEDICINE

## 2020-12-18 PROCEDURE — 3008F PR BODY MASS INDEX (BMI) DOCUMENTED: ICD-10-PCS | Mod: CPTII,S$GLB,, | Performed by: INTERNAL MEDICINE

## 2020-12-18 PROCEDURE — 3288F FALL RISK ASSESSMENT DOCD: CPT | Mod: CPTII,S$GLB,, | Performed by: INTERNAL MEDICINE

## 2020-12-18 PROCEDURE — 1101F PT FALLS ASSESS-DOCD LE1/YR: CPT | Mod: CPTII,S$GLB,, | Performed by: INTERNAL MEDICINE

## 2020-12-18 PROCEDURE — 1159F MED LIST DOCD IN RCRD: CPT | Mod: S$GLB,,, | Performed by: INTERNAL MEDICINE

## 2020-12-18 PROCEDURE — 99999 PR PBB SHADOW E&M-EST. PATIENT-LVL III: ICD-10-PCS | Mod: PBBFAC,,, | Performed by: INTERNAL MEDICINE

## 2020-12-18 PROCEDURE — 99214 PR OFFICE/OUTPT VISIT, EST, LEVL IV, 30-39 MIN: ICD-10-PCS | Mod: S$GLB,,, | Performed by: INTERNAL MEDICINE

## 2020-12-18 PROCEDURE — 99214 OFFICE O/P EST MOD 30 MIN: CPT | Mod: S$GLB,,, | Performed by: INTERNAL MEDICINE

## 2020-12-18 PROCEDURE — 3288F PR FALLS RISK ASSESSMENT DOCUMENTED: ICD-10-PCS | Mod: CPTII,S$GLB,, | Performed by: INTERNAL MEDICINE

## 2020-12-18 PROCEDURE — 1126F AMNT PAIN NOTED NONE PRSNT: CPT | Mod: S$GLB,,, | Performed by: INTERNAL MEDICINE

## 2020-12-18 PROCEDURE — 1126F PR PAIN SEVERITY QUANTIFIED, NO PAIN PRESENT: ICD-10-PCS | Mod: S$GLB,,, | Performed by: INTERNAL MEDICINE

## 2020-12-18 PROCEDURE — 99999 PR PBB SHADOW E&M-EST. PATIENT-LVL III: CPT | Mod: PBBFAC,,, | Performed by: INTERNAL MEDICINE

## 2020-12-18 NOTE — Clinical Note
Return to [facility] to see Dr Yang in 6 months, with CBC, CMP, PSA, testosterone checked 1-2 days prior

## 2020-12-18 NOTE — PROGRESS NOTES
PROGRESS NOTE    Subjective:       Patient ID: Lan Robles is a 65 y.o. male.    Chief Complaint: follow up for prostate cancer    Diagnosis:  Localized prostate cancer, high risk    Oncologic History:  1. Mr. Robles is a 64 yo man with T2DM, HLD, who was referred by Dr Avalos and initially saw me on 20 for prostate cancer. He saw Dr Aavlos in March of this year for evaluation of a markedly elevated PSA (159 ng/ml) and an abnormal CONCEPCIÓN. TRUS and biopsy on 3/12/20 revealed a hypoechoic prostate with possible extraprostatic extension.  The seminal vesicle angles appeared normal.  Biopsies revealed Sina 9 (4+5) adenocarcinoma. CT of the abdomen and pelvis and bone scan 3/24/20 was negative for evidence of metastatic disease. He had a small (1.8 cm) right upper pole solid renal lesion, concerning for renal cell carcinoma on CT scan. Hormonal  therapy was initiated with Firmagon followed by Lupron on 20.  On 20, PSA 80.9, testosterone 162. Casodex was added. On 20, PSA 69.7, testosterone 188.  Patient was seen by Dr Villalobos. RT in addition to ADT is recommended. Patient presents today for further management. Feeling well. Taking casodex. No complaints.   2. Completed radiation on 11/10/2020.     Interval History:   Mr Robles returns for follow up. Feeling well. No complaints. Completed radiation on 11/10/2020. Tolerated it well.     ECO    ROS:   A ten-point system review is obtained and negative except for what was stated in the Interval History.     Physical Examination:   Vital signs reviewed.   General: well hydrated, well developed, in no acute distress  HEENT: normocephalic, PERRLA, EOMI, anicteric sclerae, oropharynx clear  Neck: supple, no JVD, thyromegaly, cervical or supraclavicular lymphadenopathy  Lungs: clear breath sounds bilaterally, no wheezing, rales, or rhonchi  Heart: RRR, no M/R/G  Abdomen: soft, no  tenderness, non-distended, no hepatosplenomegaly, mass, or hernia. BS present  Extremities: no clubbing, cyanosis, or edema  Skin: no rash, ulcer, or open wounds  Neuro: alert and oriented x 4, no focal neuro deficit  Psych: pleasant and appropriate mood and affect    Objective:     Laboratory Data:  Labs reviewed. PSA 0.21    Imaging Data:  Bone scan 3/24/2020:  There is no scintigraphic evidence of metastatic disease.     CT A/P 3/24/20:  No evidence of metastatic disease.     Small (1.8 cm) right upper pole solid renal lesion, concerning for renal cell carcinoma.     Bilateral nonobstructing renal stones.       Assessment and Plan:     1. Prostate cancer    2. Kidney lesion      1.  - Mr Robles is a 66 yo man who presents today for further management of high risk localized prostate cancer. He is on ADT and completed radiation on 11/10/2020  - reviewed PSA result with patient. PSA 0.21, has decreased significantly compared to prior  - f/u with Dr Villalobos in Jan and Dr Avalos in Feb  - RTC in 6 months for follow up    2.  - monitored by Dr Avalos. Plan is for surveillance scan   - f/u with Dr Avalos in Feb    His multiple questions were answered in the clinic. Encouraged to call should issues arise.       Electronically signed by Nicolle Gomez

## 2021-01-19 ENCOUNTER — PATIENT MESSAGE (OUTPATIENT)
Dept: RADIATION ONCOLOGY | Facility: CLINIC | Age: 66
End: 2021-01-19

## 2021-01-19 ENCOUNTER — PATIENT MESSAGE (OUTPATIENT)
Dept: UROLOGY | Facility: CLINIC | Age: 66
End: 2021-01-19

## 2021-01-26 ENCOUNTER — LAB VISIT (OUTPATIENT)
Dept: LAB | Facility: OTHER | Age: 66
End: 2021-01-26
Attending: NURSE PRACTITIONER
Payer: MEDICARE

## 2021-01-26 DIAGNOSIS — C61 PROSTATE CANCER: ICD-10-CM

## 2021-01-26 LAB
COMPLEXED PSA SERPL-MCNC: 0.08 NG/ML (ref 0–4)
TESTOST SERPL-MCNC: 20 NG/DL (ref 304–1227)

## 2021-01-26 PROCEDURE — 84153 ASSAY OF PSA TOTAL: CPT

## 2021-01-26 PROCEDURE — 36415 COLL VENOUS BLD VENIPUNCTURE: CPT

## 2021-01-26 PROCEDURE — 84403 ASSAY OF TOTAL TESTOSTERONE: CPT

## 2021-01-27 ENCOUNTER — OFFICE VISIT (OUTPATIENT)
Dept: RADIATION ONCOLOGY | Facility: CLINIC | Age: 66
End: 2021-01-27
Attending: RADIOLOGY
Payer: MEDICARE

## 2021-01-27 VITALS
RESPIRATION RATE: 18 BRPM | TEMPERATURE: 98 F | BODY MASS INDEX: 22.35 KG/M2 | WEIGHT: 155.81 LBS | HEART RATE: 77 BPM | DIASTOLIC BLOOD PRESSURE: 73 MMHG | SYSTOLIC BLOOD PRESSURE: 136 MMHG

## 2021-01-27 DIAGNOSIS — C61 PROSTATE CANCER: Primary | ICD-10-CM

## 2021-01-27 PROCEDURE — 3288F PR FALLS RISK ASSESSMENT DOCUMENTED: ICD-10-PCS | Mod: CPTII,S$GLB,, | Performed by: RADIOLOGY

## 2021-01-27 PROCEDURE — 3008F BODY MASS INDEX DOCD: CPT | Mod: CPTII,S$GLB,, | Performed by: RADIOLOGY

## 2021-01-27 PROCEDURE — 3008F PR BODY MASS INDEX (BMI) DOCUMENTED: ICD-10-PCS | Mod: CPTII,S$GLB,, | Performed by: RADIOLOGY

## 2021-01-27 PROCEDURE — 3288F FALL RISK ASSESSMENT DOCD: CPT | Mod: CPTII,S$GLB,, | Performed by: RADIOLOGY

## 2021-01-27 PROCEDURE — 99999 PR PBB SHADOW E&M-EST. PATIENT-LVL III: ICD-10-PCS | Mod: PBBFAC,,, | Performed by: RADIOLOGY

## 2021-01-27 PROCEDURE — 99999 PR PBB SHADOW E&M-EST. PATIENT-LVL III: CPT | Mod: PBBFAC,,, | Performed by: RADIOLOGY

## 2021-01-27 PROCEDURE — 1101F PR PT FALLS ASSESS DOC 0-1 FALLS W/OUT INJ PAST YR: ICD-10-PCS | Mod: CPTII,S$GLB,, | Performed by: RADIOLOGY

## 2021-01-27 PROCEDURE — 1126F PR PAIN SEVERITY QUANTIFIED, NO PAIN PRESENT: ICD-10-PCS | Mod: S$GLB,,, | Performed by: RADIOLOGY

## 2021-01-27 PROCEDURE — 99499 NO LOS: ICD-10-PCS | Mod: S$GLB,,, | Performed by: RADIOLOGY

## 2021-01-27 PROCEDURE — 99499 UNLISTED E&M SERVICE: CPT | Mod: S$GLB,,, | Performed by: RADIOLOGY

## 2021-01-27 PROCEDURE — 1126F AMNT PAIN NOTED NONE PRSNT: CPT | Mod: S$GLB,,, | Performed by: RADIOLOGY

## 2021-01-27 PROCEDURE — 1101F PT FALLS ASSESS-DOCD LE1/YR: CPT | Mod: CPTII,S$GLB,, | Performed by: RADIOLOGY

## 2021-02-02 ENCOUNTER — OFFICE VISIT (OUTPATIENT)
Dept: UROLOGY | Facility: CLINIC | Age: 66
End: 2021-02-02
Payer: MEDICARE

## 2021-02-02 VITALS
HEIGHT: 70 IN | SYSTOLIC BLOOD PRESSURE: 145 MMHG | HEART RATE: 92 BPM | DIASTOLIC BLOOD PRESSURE: 92 MMHG | WEIGHT: 155.88 LBS | BODY MASS INDEX: 22.32 KG/M2

## 2021-02-02 DIAGNOSIS — N28.89 RENAL MASS: ICD-10-CM

## 2021-02-02 DIAGNOSIS — C61 PROSTATE CANCER: Primary | ICD-10-CM

## 2021-02-02 PROCEDURE — 1101F PR PT FALLS ASSESS DOC 0-1 FALLS W/OUT INJ PAST YR: ICD-10-PCS | Mod: CPTII,S$GLB,, | Performed by: UROLOGY

## 2021-02-02 PROCEDURE — 1159F MED LIST DOCD IN RCRD: CPT | Mod: S$GLB,,, | Performed by: UROLOGY

## 2021-02-02 PROCEDURE — 1159F PR MEDICATION LIST DOCUMENTED IN MEDICAL RECORD: ICD-10-PCS | Mod: S$GLB,,, | Performed by: UROLOGY

## 2021-02-02 PROCEDURE — 99214 PR OFFICE/OUTPT VISIT, EST, LEVL IV, 30-39 MIN: ICD-10-PCS | Mod: S$GLB,,, | Performed by: UROLOGY

## 2021-02-02 PROCEDURE — 3288F PR FALLS RISK ASSESSMENT DOCUMENTED: ICD-10-PCS | Mod: CPTII,S$GLB,, | Performed by: UROLOGY

## 2021-02-02 PROCEDURE — 1126F AMNT PAIN NOTED NONE PRSNT: CPT | Mod: S$GLB,,, | Performed by: UROLOGY

## 2021-02-02 PROCEDURE — 99214 OFFICE O/P EST MOD 30 MIN: CPT | Mod: S$GLB,,, | Performed by: UROLOGY

## 2021-02-02 PROCEDURE — 1126F PR PAIN SEVERITY QUANTIFIED, NO PAIN PRESENT: ICD-10-PCS | Mod: S$GLB,,, | Performed by: UROLOGY

## 2021-02-02 PROCEDURE — 3288F FALL RISK ASSESSMENT DOCD: CPT | Mod: CPTII,S$GLB,, | Performed by: UROLOGY

## 2021-02-02 PROCEDURE — 1101F PT FALLS ASSESS-DOCD LE1/YR: CPT | Mod: CPTII,S$GLB,, | Performed by: UROLOGY

## 2021-02-02 PROCEDURE — 3008F PR BODY MASS INDEX (BMI) DOCUMENTED: ICD-10-PCS | Mod: CPTII,S$GLB,, | Performed by: UROLOGY

## 2021-02-02 PROCEDURE — 3008F BODY MASS INDEX DOCD: CPT | Mod: CPTII,S$GLB,, | Performed by: UROLOGY

## 2021-02-02 RX ORDER — ASPIRIN 325 MG
TABLET, DELAYED RELEASE (ENTERIC COATED) ORAL
COMMUNITY
Start: 2021-01-10

## 2021-02-02 RX ORDER — INSULIN DEGLUDEC 100 U/ML
INJECTION, SOLUTION SUBCUTANEOUS
COMMUNITY
Start: 2021-01-26

## 2021-02-02 RX ORDER — PEN NEEDLE, DIABETIC 32GX 5/32"
NEEDLE, DISPOSABLE MISCELLANEOUS
COMMUNITY
Start: 2021-01-26

## 2021-02-02 RX ORDER — FLUTICASONE PROPIONATE 50 MCG
1 SPRAY, SUSPENSION (ML) NASAL DAILY
COMMUNITY
Start: 2020-12-03 | End: 2022-05-11

## 2021-02-02 RX ORDER — METFORMIN HYDROCHLORIDE 1000 MG/1
TABLET ORAL
COMMUNITY
Start: 2021-01-30

## 2021-02-02 RX ORDER — SODIUM, POTASSIUM,MAG SULFATES 17.5-3.13G
SOLUTION, RECONSTITUTED, ORAL ORAL
COMMUNITY
Start: 2020-11-16 | End: 2021-04-30

## 2021-04-16 ENCOUNTER — PATIENT MESSAGE (OUTPATIENT)
Dept: RESEARCH | Facility: HOSPITAL | Age: 66
End: 2021-04-16

## 2021-04-23 ENCOUNTER — HOSPITAL ENCOUNTER (OUTPATIENT)
Dept: RADIOLOGY | Facility: OTHER | Age: 66
Discharge: HOME | End: 2021-04-23
Attending: UROLOGY
Payer: MEDICARE

## 2021-04-23 DIAGNOSIS — N28.89 RENAL MASS: ICD-10-CM

## 2021-04-23 DIAGNOSIS — C61 PROSTATE CANCER: ICD-10-CM

## 2021-04-23 PROCEDURE — 74170 CT ABDOMEN W WO CONTRAST: ICD-10-PCS | Mod: 26,,, | Performed by: RADIOLOGY

## 2021-04-23 PROCEDURE — 74170 CT ABD WO CNTRST FLWD CNTRST: CPT | Mod: 26,,, | Performed by: RADIOLOGY

## 2021-04-23 PROCEDURE — 25500020 PHARM REV CODE 255: Performed by: UROLOGY

## 2021-04-23 PROCEDURE — 74170 CT ABD WO CNTRST FLWD CNTRST: CPT | Mod: TC

## 2021-04-23 RX ADMIN — IOHEXOL 75 ML: 350 INJECTION, SOLUTION INTRAVENOUS at 10:04

## 2021-04-26 ENCOUNTER — TELEPHONE (OUTPATIENT)
Dept: UROLOGY | Facility: CLINIC | Age: 66
End: 2021-04-26

## 2021-04-30 ENCOUNTER — OFFICE VISIT (OUTPATIENT)
Dept: UROLOGY | Facility: CLINIC | Age: 66
End: 2021-04-30
Payer: MEDICARE

## 2021-04-30 VITALS
SYSTOLIC BLOOD PRESSURE: 143 MMHG | HEIGHT: 70 IN | WEIGHT: 155.88 LBS | DIASTOLIC BLOOD PRESSURE: 88 MMHG | BODY MASS INDEX: 22.32 KG/M2 | HEART RATE: 80 BPM

## 2021-04-30 DIAGNOSIS — C61 PROSTATE CANCER: Primary | ICD-10-CM

## 2021-04-30 DIAGNOSIS — N28.89 RENAL MASS: ICD-10-CM

## 2021-04-30 PROCEDURE — 1126F AMNT PAIN NOTED NONE PRSNT: CPT | Mod: S$GLB,,, | Performed by: UROLOGY

## 2021-04-30 PROCEDURE — 1159F MED LIST DOCD IN RCRD: CPT | Mod: S$GLB,,, | Performed by: UROLOGY

## 2021-04-30 PROCEDURE — 3288F PR FALLS RISK ASSESSMENT DOCUMENTED: ICD-10-PCS | Mod: CPTII,S$GLB,, | Performed by: UROLOGY

## 2021-04-30 PROCEDURE — 96402 CHEMO HORMON ANTINEOPL SQ/IM: CPT | Mod: S$GLB,,, | Performed by: UROLOGY

## 2021-04-30 PROCEDURE — 3288F FALL RISK ASSESSMENT DOCD: CPT | Mod: CPTII,S$GLB,, | Performed by: UROLOGY

## 2021-04-30 PROCEDURE — 1101F PT FALLS ASSESS-DOCD LE1/YR: CPT | Mod: CPTII,S$GLB,, | Performed by: UROLOGY

## 2021-04-30 PROCEDURE — 1126F PR PAIN SEVERITY QUANTIFIED, NO PAIN PRESENT: ICD-10-PCS | Mod: S$GLB,,, | Performed by: UROLOGY

## 2021-04-30 PROCEDURE — 1159F PR MEDICATION LIST DOCUMENTED IN MEDICAL RECORD: ICD-10-PCS | Mod: S$GLB,,, | Performed by: UROLOGY

## 2021-04-30 PROCEDURE — 96402 PR CHEMOTHER HORMON ANTINEOPL SUB-Q/IM: ICD-10-PCS | Mod: S$GLB,,, | Performed by: UROLOGY

## 2021-04-30 PROCEDURE — 3008F BODY MASS INDEX DOCD: CPT | Mod: CPTII,S$GLB,, | Performed by: UROLOGY

## 2021-04-30 PROCEDURE — 3008F PR BODY MASS INDEX (BMI) DOCUMENTED: ICD-10-PCS | Mod: CPTII,S$GLB,, | Performed by: UROLOGY

## 2021-04-30 PROCEDURE — 1101F PR PT FALLS ASSESS DOC 0-1 FALLS W/OUT INJ PAST YR: ICD-10-PCS | Mod: CPTII,S$GLB,, | Performed by: UROLOGY

## 2021-04-30 PROCEDURE — 99214 PR OFFICE/OUTPT VISIT, EST, LEVL IV, 30-39 MIN: ICD-10-PCS | Mod: 25,S$GLB,, | Performed by: UROLOGY

## 2021-04-30 PROCEDURE — 99214 OFFICE O/P EST MOD 30 MIN: CPT | Mod: 25,S$GLB,, | Performed by: UROLOGY

## 2021-04-30 RX ORDER — KETOROLAC TROMETHAMINE 5 MG/ML
SOLUTION OPHTHALMIC
COMMUNITY
Start: 2021-02-03 | End: 2022-08-16

## 2021-04-30 RX ORDER — PREDNISOLONE ACETATE 10 MG/ML
SUSPENSION/ DROPS OPHTHALMIC
COMMUNITY
Start: 2021-02-03

## 2021-06-02 ENCOUNTER — DOCUMENTATION ONLY (OUTPATIENT)
Dept: HEMATOLOGY/ONCOLOGY | Facility: CLINIC | Age: 66
End: 2021-06-02

## 2021-06-18 ENCOUNTER — LAB VISIT (OUTPATIENT)
Dept: LAB | Facility: OTHER | Age: 66
End: 2021-06-18
Attending: INTERNAL MEDICINE
Payer: MEDICARE

## 2021-06-18 DIAGNOSIS — C61 PROSTATE CANCER: ICD-10-CM

## 2021-06-18 LAB
ALBUMIN SERPL BCP-MCNC: 3.6 G/DL (ref 3.5–5.2)
ALP SERPL-CCNC: 112 U/L (ref 55–135)
ALT SERPL W/O P-5'-P-CCNC: 20 U/L (ref 10–44)
ANION GAP SERPL CALC-SCNC: 11 MMOL/L (ref 8–16)
AST SERPL-CCNC: 15 U/L (ref 10–40)
BASOPHILS # BLD AUTO: 0.02 K/UL (ref 0–0.2)
BASOPHILS NFR BLD: 0.3 % (ref 0–1.9)
BILIRUB SERPL-MCNC: 0.5 MG/DL (ref 0.1–1)
BUN SERPL-MCNC: 16 MG/DL (ref 8–23)
CALCIUM SERPL-MCNC: 10.2 MG/DL (ref 8.7–10.5)
CHLORIDE SERPL-SCNC: 106 MMOL/L (ref 95–110)
CO2 SERPL-SCNC: 26 MMOL/L (ref 23–29)
CREAT SERPL-MCNC: 1.2 MG/DL (ref 0.5–1.4)
DIFFERENTIAL METHOD: ABNORMAL
EOSINOPHIL # BLD AUTO: 0.4 K/UL (ref 0–0.5)
EOSINOPHIL NFR BLD: 6.2 % (ref 0–8)
ERYTHROCYTE [DISTWIDTH] IN BLOOD BY AUTOMATED COUNT: 12.7 % (ref 11.5–14.5)
EST. GFR  (AFRICAN AMERICAN): >60 ML/MIN/1.73 M^2
EST. GFR  (NON AFRICAN AMERICAN): >60 ML/MIN/1.73 M^2
GLUCOSE SERPL-MCNC: 165 MG/DL (ref 70–110)
HCT VFR BLD AUTO: 45.1 % (ref 40–54)
HGB BLD-MCNC: 14.4 G/DL (ref 14–18)
IMM GRANULOCYTES # BLD AUTO: 0.03 K/UL (ref 0–0.04)
IMM GRANULOCYTES NFR BLD AUTO: 0.5 % (ref 0–0.5)
LYMPHOCYTES # BLD AUTO: 1 K/UL (ref 1–4.8)
LYMPHOCYTES NFR BLD: 16.9 % (ref 18–48)
MCH RBC QN AUTO: 28.1 PG (ref 27–31)
MCHC RBC AUTO-ENTMCNC: 31.9 G/DL (ref 32–36)
MCV RBC AUTO: 88 FL (ref 82–98)
MONOCYTES # BLD AUTO: 0.6 K/UL (ref 0.3–1)
MONOCYTES NFR BLD: 9.1 % (ref 4–15)
NEUTROPHILS # BLD AUTO: 4.1 K/UL (ref 1.8–7.7)
NEUTROPHILS NFR BLD: 67 % (ref 38–73)
NRBC BLD-RTO: 0 /100 WBC
PLATELET # BLD AUTO: 287 K/UL (ref 150–450)
PMV BLD AUTO: 10.2 FL (ref 9.2–12.9)
POTASSIUM SERPL-SCNC: 4 MMOL/L (ref 3.5–5.1)
PROT SERPL-MCNC: 7.6 G/DL (ref 6–8.4)
RBC # BLD AUTO: 5.12 M/UL (ref 4.6–6.2)
SODIUM SERPL-SCNC: 143 MMOL/L (ref 136–145)
WBC # BLD AUTO: 6.17 K/UL (ref 3.9–12.7)

## 2021-06-18 PROCEDURE — 84153 ASSAY OF PSA TOTAL: CPT | Performed by: INTERNAL MEDICINE

## 2021-06-18 PROCEDURE — 84403 ASSAY OF TOTAL TESTOSTERONE: CPT | Performed by: INTERNAL MEDICINE

## 2021-06-18 PROCEDURE — 80053 COMPREHEN METABOLIC PANEL: CPT | Performed by: INTERNAL MEDICINE

## 2021-06-18 PROCEDURE — 36415 COLL VENOUS BLD VENIPUNCTURE: CPT | Performed by: INTERNAL MEDICINE

## 2021-06-18 PROCEDURE — 85025 COMPLETE CBC W/AUTO DIFF WBC: CPT | Performed by: INTERNAL MEDICINE

## 2021-06-19 LAB
COMPLEXED PSA SERPL-MCNC: 0.05 NG/ML (ref 0–4)
TESTOST SERPL-MCNC: 165 NG/DL (ref 304–1227)

## 2021-06-21 ENCOUNTER — OFFICE VISIT (OUTPATIENT)
Dept: HEMATOLOGY/ONCOLOGY | Facility: CLINIC | Age: 66
End: 2021-06-21
Payer: MEDICARE

## 2021-06-21 VITALS
WEIGHT: 151.88 LBS | HEIGHT: 70 IN | BODY MASS INDEX: 21.74 KG/M2 | RESPIRATION RATE: 16 BRPM | DIASTOLIC BLOOD PRESSURE: 88 MMHG | TEMPERATURE: 98 F | HEART RATE: 81 BPM | OXYGEN SATURATION: 95 % | SYSTOLIC BLOOD PRESSURE: 134 MMHG

## 2021-06-21 DIAGNOSIS — C61 PROSTATE CANCER: Primary | ICD-10-CM

## 2021-06-21 DIAGNOSIS — N28.89 RENAL MASS: ICD-10-CM

## 2021-06-21 PROCEDURE — 1126F AMNT PAIN NOTED NONE PRSNT: CPT | Mod: S$GLB,,, | Performed by: STUDENT IN AN ORGANIZED HEALTH CARE EDUCATION/TRAINING PROGRAM

## 2021-06-21 PROCEDURE — 1159F PR MEDICATION LIST DOCUMENTED IN MEDICAL RECORD: ICD-10-PCS | Mod: S$GLB,,, | Performed by: STUDENT IN AN ORGANIZED HEALTH CARE EDUCATION/TRAINING PROGRAM

## 2021-06-21 PROCEDURE — 99213 PR OFFICE/OUTPT VISIT, EST, LEVL III, 20-29 MIN: ICD-10-PCS | Mod: S$GLB,,, | Performed by: STUDENT IN AN ORGANIZED HEALTH CARE EDUCATION/TRAINING PROGRAM

## 2021-06-21 PROCEDURE — 99999 PR PBB SHADOW E&M-EST. PATIENT-LVL IV: CPT | Mod: PBBFAC,,, | Performed by: STUDENT IN AN ORGANIZED HEALTH CARE EDUCATION/TRAINING PROGRAM

## 2021-06-21 PROCEDURE — 1101F PR PT FALLS ASSESS DOC 0-1 FALLS W/OUT INJ PAST YR: ICD-10-PCS | Mod: CPTII,S$GLB,, | Performed by: STUDENT IN AN ORGANIZED HEALTH CARE EDUCATION/TRAINING PROGRAM

## 2021-06-21 PROCEDURE — 1101F PT FALLS ASSESS-DOCD LE1/YR: CPT | Mod: CPTII,S$GLB,, | Performed by: STUDENT IN AN ORGANIZED HEALTH CARE EDUCATION/TRAINING PROGRAM

## 2021-06-21 PROCEDURE — 3288F PR FALLS RISK ASSESSMENT DOCUMENTED: ICD-10-PCS | Mod: CPTII,S$GLB,, | Performed by: STUDENT IN AN ORGANIZED HEALTH CARE EDUCATION/TRAINING PROGRAM

## 2021-06-21 PROCEDURE — 3008F BODY MASS INDEX DOCD: CPT | Mod: CPTII,S$GLB,, | Performed by: STUDENT IN AN ORGANIZED HEALTH CARE EDUCATION/TRAINING PROGRAM

## 2021-06-21 PROCEDURE — 99999 PR PBB SHADOW E&M-EST. PATIENT-LVL IV: ICD-10-PCS | Mod: PBBFAC,,, | Performed by: STUDENT IN AN ORGANIZED HEALTH CARE EDUCATION/TRAINING PROGRAM

## 2021-06-21 PROCEDURE — 1126F PR PAIN SEVERITY QUANTIFIED, NO PAIN PRESENT: ICD-10-PCS | Mod: S$GLB,,, | Performed by: STUDENT IN AN ORGANIZED HEALTH CARE EDUCATION/TRAINING PROGRAM

## 2021-06-21 PROCEDURE — 3008F PR BODY MASS INDEX (BMI) DOCUMENTED: ICD-10-PCS | Mod: CPTII,S$GLB,, | Performed by: STUDENT IN AN ORGANIZED HEALTH CARE EDUCATION/TRAINING PROGRAM

## 2021-06-21 PROCEDURE — 3288F FALL RISK ASSESSMENT DOCD: CPT | Mod: CPTII,S$GLB,, | Performed by: STUDENT IN AN ORGANIZED HEALTH CARE EDUCATION/TRAINING PROGRAM

## 2021-06-21 PROCEDURE — 1159F MED LIST DOCD IN RCRD: CPT | Mod: S$GLB,,, | Performed by: STUDENT IN AN ORGANIZED HEALTH CARE EDUCATION/TRAINING PROGRAM

## 2021-06-21 PROCEDURE — 99213 OFFICE O/P EST LOW 20 MIN: CPT | Mod: S$GLB,,, | Performed by: STUDENT IN AN ORGANIZED HEALTH CARE EDUCATION/TRAINING PROGRAM

## 2021-07-27 DIAGNOSIS — C61 PROSTATE CANCER: ICD-10-CM

## 2021-07-27 RX ORDER — BICALUTAMIDE 50 MG/1
50 TABLET, FILM COATED ORAL DAILY
Qty: 30 TABLET | Refills: 11 | Status: SHIPPED | OUTPATIENT
Start: 2021-07-27 | End: 2024-03-15

## 2021-10-25 ENCOUNTER — LAB VISIT (OUTPATIENT)
Dept: LAB | Facility: OTHER | Age: 66
End: 2021-10-25
Attending: UROLOGY
Payer: MEDICARE

## 2021-10-25 DIAGNOSIS — C61 PROSTATE CANCER: ICD-10-CM

## 2021-10-25 DIAGNOSIS — N28.89 RENAL MASS: ICD-10-CM

## 2021-10-25 LAB
ALBUMIN SERPL BCP-MCNC: 3.9 G/DL (ref 3.5–5.2)
ALP SERPL-CCNC: 104 U/L (ref 55–135)
ALT SERPL W/O P-5'-P-CCNC: 21 U/L (ref 10–44)
ANION GAP SERPL CALC-SCNC: 11 MMOL/L (ref 8–16)
AST SERPL-CCNC: 16 U/L (ref 10–40)
BASOPHILS # BLD AUTO: 0.04 K/UL (ref 0–0.2)
BASOPHILS NFR BLD: 0.8 % (ref 0–1.9)
BILIRUB SERPL-MCNC: 0.4 MG/DL (ref 0.1–1)
BUN SERPL-MCNC: 14 MG/DL (ref 8–23)
CALCIUM SERPL-MCNC: 10.2 MG/DL (ref 8.7–10.5)
CHLORIDE SERPL-SCNC: 103 MMOL/L (ref 95–110)
CO2 SERPL-SCNC: 25 MMOL/L (ref 23–29)
COMPLEXED PSA SERPL-MCNC: <0.01 NG/ML (ref 0–4)
CREAT SERPL-MCNC: 1.1 MG/DL (ref 0.5–1.4)
DIFFERENTIAL METHOD: ABNORMAL
EOSINOPHIL # BLD AUTO: 0.4 K/UL (ref 0–0.5)
EOSINOPHIL NFR BLD: 7.9 % (ref 0–8)
ERYTHROCYTE [DISTWIDTH] IN BLOOD BY AUTOMATED COUNT: 12.7 % (ref 11.5–14.5)
EST. GFR  (AFRICAN AMERICAN): >60 ML/MIN/1.73 M^2
EST. GFR  (NON AFRICAN AMERICAN): >60 ML/MIN/1.73 M^2
GLUCOSE SERPL-MCNC: 239 MG/DL (ref 70–110)
HCT VFR BLD AUTO: 43.7 % (ref 40–54)
HGB BLD-MCNC: 14.6 G/DL (ref 14–18)
IMM GRANULOCYTES # BLD AUTO: 0.04 K/UL (ref 0–0.04)
IMM GRANULOCYTES NFR BLD AUTO: 0.8 % (ref 0–0.5)
LYMPHOCYTES # BLD AUTO: 1.2 K/UL (ref 1–4.8)
LYMPHOCYTES NFR BLD: 22.1 % (ref 18–48)
MCH RBC QN AUTO: 29.3 PG (ref 27–31)
MCHC RBC AUTO-ENTMCNC: 33.4 G/DL (ref 32–36)
MCV RBC AUTO: 88 FL (ref 82–98)
MONOCYTES # BLD AUTO: 0.5 K/UL (ref 0.3–1)
MONOCYTES NFR BLD: 8.4 % (ref 4–15)
NEUTROPHILS # BLD AUTO: 3.2 K/UL (ref 1.8–7.7)
NEUTROPHILS NFR BLD: 60 % (ref 38–73)
NRBC BLD-RTO: 0 /100 WBC
PLATELET # BLD AUTO: 250 K/UL (ref 150–450)
PMV BLD AUTO: 10.9 FL (ref 9.2–12.9)
POTASSIUM SERPL-SCNC: 4.8 MMOL/L (ref 3.5–5.1)
PROT SERPL-MCNC: 8 G/DL (ref 6–8.4)
RBC # BLD AUTO: 4.99 M/UL (ref 4.6–6.2)
SODIUM SERPL-SCNC: 139 MMOL/L (ref 136–145)
TESTOST SERPL-MCNC: 19 NG/DL (ref 304–1227)
WBC # BLD AUTO: 5.33 K/UL (ref 3.9–12.7)

## 2021-10-25 PROCEDURE — 36415 COLL VENOUS BLD VENIPUNCTURE: CPT | Performed by: UROLOGY

## 2021-10-25 PROCEDURE — 80053 COMPREHEN METABOLIC PANEL: CPT | Performed by: UROLOGY

## 2021-10-25 PROCEDURE — 84403 ASSAY OF TOTAL TESTOSTERONE: CPT | Performed by: UROLOGY

## 2021-10-25 PROCEDURE — 84153 ASSAY OF PSA TOTAL: CPT | Performed by: UROLOGY

## 2021-10-25 PROCEDURE — 85025 COMPLETE CBC W/AUTO DIFF WBC: CPT | Performed by: UROLOGY

## 2021-11-05 ENCOUNTER — OFFICE VISIT (OUTPATIENT)
Dept: UROLOGY | Facility: CLINIC | Age: 66
End: 2021-11-05
Payer: MEDICARE

## 2021-11-05 VITALS — BODY MASS INDEX: 21.79 KG/M2 | SYSTOLIC BLOOD PRESSURE: 152 MMHG | HEIGHT: 70 IN | DIASTOLIC BLOOD PRESSURE: 93 MMHG

## 2021-11-05 DIAGNOSIS — N28.89 RENAL MASS: Primary | ICD-10-CM

## 2021-11-05 DIAGNOSIS — C61 PROSTATE CANCER: ICD-10-CM

## 2021-11-05 PROCEDURE — 1159F PR MEDICATION LIST DOCUMENTED IN MEDICAL RECORD: ICD-10-PCS | Mod: CPTII,S$GLB,, | Performed by: NURSE PRACTITIONER

## 2021-11-05 PROCEDURE — 1126F PR PAIN SEVERITY QUANTIFIED, NO PAIN PRESENT: ICD-10-PCS | Mod: CPTII,S$GLB,, | Performed by: NURSE PRACTITIONER

## 2021-11-05 PROCEDURE — 1126F AMNT PAIN NOTED NONE PRSNT: CPT | Mod: CPTII,S$GLB,, | Performed by: NURSE PRACTITIONER

## 2021-11-05 PROCEDURE — 3008F BODY MASS INDEX DOCD: CPT | Mod: CPTII,S$GLB,, | Performed by: NURSE PRACTITIONER

## 2021-11-05 PROCEDURE — 99213 OFFICE O/P EST LOW 20 MIN: CPT | Mod: 25,S$GLB,, | Performed by: NURSE PRACTITIONER

## 2021-11-05 PROCEDURE — 1159F MED LIST DOCD IN RCRD: CPT | Mod: CPTII,S$GLB,, | Performed by: NURSE PRACTITIONER

## 2021-11-05 PROCEDURE — 3080F PR MOST RECENT DIASTOLIC BLOOD PRESSURE >= 90 MM HG: ICD-10-PCS | Mod: CPTII,S$GLB,, | Performed by: NURSE PRACTITIONER

## 2021-11-05 PROCEDURE — 1101F PT FALLS ASSESS-DOCD LE1/YR: CPT | Mod: CPTII,S$GLB,, | Performed by: NURSE PRACTITIONER

## 2021-11-05 PROCEDURE — 1101F PR PT FALLS ASSESS DOC 0-1 FALLS W/OUT INJ PAST YR: ICD-10-PCS | Mod: CPTII,S$GLB,, | Performed by: NURSE PRACTITIONER

## 2021-11-05 PROCEDURE — 3008F PR BODY MASS INDEX (BMI) DOCUMENTED: ICD-10-PCS | Mod: CPTII,S$GLB,, | Performed by: NURSE PRACTITIONER

## 2021-11-05 PROCEDURE — 99213 PR OFFICE/OUTPT VISIT, EST, LEVL III, 20-29 MIN: ICD-10-PCS | Mod: 25,S$GLB,, | Performed by: NURSE PRACTITIONER

## 2021-11-05 PROCEDURE — 96402 PR CHEMOTHER HORMON ANTINEOPL SUB-Q/IM: ICD-10-PCS | Mod: S$GLB,,, | Performed by: NURSE PRACTITIONER

## 2021-11-05 PROCEDURE — 3288F PR FALLS RISK ASSESSMENT DOCUMENTED: ICD-10-PCS | Mod: CPTII,S$GLB,, | Performed by: NURSE PRACTITIONER

## 2021-11-05 PROCEDURE — 3077F PR MOST RECENT SYSTOLIC BLOOD PRESSURE >= 140 MM HG: ICD-10-PCS | Mod: CPTII,S$GLB,, | Performed by: NURSE PRACTITIONER

## 2021-11-05 PROCEDURE — 3077F SYST BP >= 140 MM HG: CPT | Mod: CPTII,S$GLB,, | Performed by: NURSE PRACTITIONER

## 2021-11-05 PROCEDURE — 3288F FALL RISK ASSESSMENT DOCD: CPT | Mod: CPTII,S$GLB,, | Performed by: NURSE PRACTITIONER

## 2021-11-05 PROCEDURE — 3080F DIAST BP >= 90 MM HG: CPT | Mod: CPTII,S$GLB,, | Performed by: NURSE PRACTITIONER

## 2021-11-05 PROCEDURE — 96402 CHEMO HORMON ANTINEOPL SQ/IM: CPT | Mod: S$GLB,,, | Performed by: NURSE PRACTITIONER

## 2021-11-05 RX ORDER — TRAZODONE HYDROCHLORIDE 100 MG/1
100 TABLET ORAL NIGHTLY
Status: ON HOLD | COMMUNITY
Start: 2021-09-29 | End: 2022-08-12 | Stop reason: CLARIF

## 2022-01-28 ENCOUNTER — LAB VISIT (OUTPATIENT)
Dept: LAB | Facility: OTHER | Age: 67
End: 2022-01-28
Attending: STUDENT IN AN ORGANIZED HEALTH CARE EDUCATION/TRAINING PROGRAM
Payer: MEDICARE

## 2022-01-28 DIAGNOSIS — C61 PROSTATE CANCER: ICD-10-CM

## 2022-01-28 LAB
ALBUMIN SERPL BCP-MCNC: 3.8 G/DL (ref 3.5–5.2)
ALP SERPL-CCNC: 128 U/L (ref 55–135)
ALT SERPL W/O P-5'-P-CCNC: 22 U/L (ref 10–44)
ANION GAP SERPL CALC-SCNC: 8 MMOL/L (ref 8–16)
AST SERPL-CCNC: 20 U/L (ref 10–40)
BASOPHILS # BLD AUTO: 0.03 K/UL (ref 0–0.2)
BASOPHILS NFR BLD: 0.5 % (ref 0–1.9)
BILIRUB SERPL-MCNC: 0.3 MG/DL (ref 0.1–1)
BUN SERPL-MCNC: 12 MG/DL (ref 8–23)
CALCIUM SERPL-MCNC: 10.2 MG/DL (ref 8.7–10.5)
CHLORIDE SERPL-SCNC: 102 MMOL/L (ref 95–110)
CO2 SERPL-SCNC: 29 MMOL/L (ref 23–29)
COMPLEXED PSA SERPL-MCNC: <0.01 NG/ML (ref 0–4)
CREAT SERPL-MCNC: 1.1 MG/DL (ref 0.5–1.4)
DIFFERENTIAL METHOD: ABNORMAL
EOSINOPHIL # BLD AUTO: 0.3 K/UL (ref 0–0.5)
EOSINOPHIL NFR BLD: 5.2 % (ref 0–8)
ERYTHROCYTE [DISTWIDTH] IN BLOOD BY AUTOMATED COUNT: 12.6 % (ref 11.5–14.5)
EST. GFR  (AFRICAN AMERICAN): >60 ML/MIN/1.73 M^2
EST. GFR  (NON AFRICAN AMERICAN): >60 ML/MIN/1.73 M^2
GLUCOSE SERPL-MCNC: 205 MG/DL (ref 70–110)
HCT VFR BLD AUTO: 40.9 % (ref 40–54)
HGB BLD-MCNC: 13 G/DL (ref 14–18)
IMM GRANULOCYTES # BLD AUTO: 0.03 K/UL (ref 0–0.04)
IMM GRANULOCYTES NFR BLD AUTO: 0.5 % (ref 0–0.5)
LYMPHOCYTES # BLD AUTO: 1.1 K/UL (ref 1–4.8)
LYMPHOCYTES NFR BLD: 18.9 % (ref 18–48)
MCH RBC QN AUTO: 27.3 PG (ref 27–31)
MCHC RBC AUTO-ENTMCNC: 31.8 G/DL (ref 32–36)
MCV RBC AUTO: 86 FL (ref 82–98)
MONOCYTES # BLD AUTO: 0.5 K/UL (ref 0.3–1)
MONOCYTES NFR BLD: 9.3 % (ref 4–15)
NEUTROPHILS # BLD AUTO: 3.7 K/UL (ref 1.8–7.7)
NEUTROPHILS NFR BLD: 65.6 % (ref 38–73)
NRBC BLD-RTO: 0 /100 WBC
PLATELET # BLD AUTO: 268 K/UL (ref 150–450)
PMV BLD AUTO: 10.5 FL (ref 9.2–12.9)
POTASSIUM SERPL-SCNC: 4 MMOL/L (ref 3.5–5.1)
PROT SERPL-MCNC: 7.8 G/DL (ref 6–8.4)
RBC # BLD AUTO: 4.76 M/UL (ref 4.6–6.2)
SODIUM SERPL-SCNC: 139 MMOL/L (ref 136–145)
TESTOST SERPL-MCNC: 21 NG/DL (ref 304–1227)
WBC # BLD AUTO: 5.57 K/UL (ref 3.9–12.7)

## 2022-01-28 PROCEDURE — 84403 ASSAY OF TOTAL TESTOSTERONE: CPT | Performed by: STUDENT IN AN ORGANIZED HEALTH CARE EDUCATION/TRAINING PROGRAM

## 2022-01-28 PROCEDURE — 36415 COLL VENOUS BLD VENIPUNCTURE: CPT | Performed by: STUDENT IN AN ORGANIZED HEALTH CARE EDUCATION/TRAINING PROGRAM

## 2022-01-28 PROCEDURE — 80053 COMPREHEN METABOLIC PANEL: CPT | Performed by: STUDENT IN AN ORGANIZED HEALTH CARE EDUCATION/TRAINING PROGRAM

## 2022-01-28 PROCEDURE — 85025 COMPLETE CBC W/AUTO DIFF WBC: CPT | Performed by: STUDENT IN AN ORGANIZED HEALTH CARE EDUCATION/TRAINING PROGRAM

## 2022-01-28 PROCEDURE — 84153 ASSAY OF PSA TOTAL: CPT | Performed by: STUDENT IN AN ORGANIZED HEALTH CARE EDUCATION/TRAINING PROGRAM

## 2022-01-28 NOTE — PROGRESS NOTES
Hematology- Oncology Clinic Note :      1/31/2022    RFV / chief complaint- Prostate Cancer and Follow-up        HPI  Pt is a 66 y.o. male who  has a past medical history of Diabetes mellitus and Prostate cancer.   Pt presents to the clinic today for prostate cancer    Pt reports to be doing well. No new complains. No abdominal pain, hematuria or urinary complains.   Appetite - good. No weight loss. No bone pains.    Quit smoking 6 weeks back after family encouraged him to quit    Reviewed past medical/surgical/social history    Past Medical History:   Diagnosis Date    Diabetes mellitus     Prostate cancer       History reviewed. No pertinent surgical history.   (Not in a hospital admission)    Review of patient's allergies indicates:  No Known Allergies   Social History     Tobacco Use    Smoking status: Former Smoker    Smokeless tobacco: Never Used   Substance Use Topics    Alcohol use: Yes      Family History   Problem Relation Age of Onset    Cancer Father     Diabetes Mother     Melanoma Neg Hx           Review of Systems :  Review of Systems   Constitutional: Negative for chills, diaphoresis, fever, malaise/fatigue and weight loss.   HENT: Negative.  Negative for congestion, hearing loss, nosebleeds, sore throat and tinnitus.    Eyes: Negative.  Negative for blurred vision and discharge.   Respiratory: Negative for cough, hemoptysis, sputum production, shortness of breath and wheezing.    Cardiovascular: Negative.  Negative for chest pain, palpitations and leg swelling.   Gastrointestinal: Negative.  Negative for abdominal pain, blood in stool, constipation, diarrhea, heartburn, melena, nausea and vomiting.   Genitourinary: Negative.    Musculoskeletal: Negative.  Negative for back pain, falls, joint pain and myalgias.   Skin: Negative.  Negative for itching and rash.   Neurological: Negative.  Negative for dizziness, tingling, sensory change, speech change, focal weakness, seizures, loss of  "consciousness, weakness and headaches.   Endo/Heme/Allergies: Negative.  Does not bruise/bleed easily.   Psychiatric/Behavioral: Negative.  Negative for depression. The patient is not nervous/anxious and does not have insomnia.                Physical Exam :  /80 (BP Location: Left arm, Patient Position: Sitting, BP Method: Medium (Automatic))   Pulse 65   Temp 97.9 °F (36.6 °C) (Oral)   Resp 18   Ht 5' 10" (1.778 m)   Wt 71.6 kg (157 lb 13.6 oz)   SpO2 (!) 94%   BMI 22.65 kg/m²   Wt Readings from Last 3 Encounters:   01/31/22 71.6 kg (157 lb 13.6 oz)   06/21/21 68.9 kg (151 lb 14.4 oz)   04/30/21 70.7 kg (155 lb 13.8 oz)       Body mass index is 22.65 kg/m².      Physical Exam  Vitals and nursing note reviewed.   Constitutional:       General: He is not in acute distress.     Appearance: He is well-developed and well-nourished.   HENT:      Head: Normocephalic and atraumatic.      Right Ear: External ear normal.      Left Ear: External ear normal.      Mouth/Throat:      Pharynx: No oropharyngeal exudate.   Eyes:      General: No scleral icterus.        Right eye: No discharge.         Left eye: No discharge.   Neck:      Thyroid: No thyromegaly.      Trachea: No tracheal deviation.   Cardiovascular:      Rate and Rhythm: Normal rate and regular rhythm.      Heart sounds: Normal heart sounds. No murmur heard.      Pulmonary:      Effort: Pulmonary effort is normal. No respiratory distress.      Breath sounds: Wheezing present. No rales.   Abdominal:      General: Bowel sounds are normal. There is no distension.      Palpations: Abdomen is soft.      Tenderness: There is no abdominal tenderness.      Hernia: No hernia is present.   Musculoskeletal:         General: No tenderness or edema. Normal range of motion.      Cervical back: Normal range of motion and neck supple.   Skin:     General: Skin is warm and dry.      Findings: No rash.   Neurological:      Mental Status: He is alert and oriented to " "person, place, and time.      Cranial Nerves: No cranial nerve deficit.      Coordination: Coordination normal.   Psychiatric:         Mood and Affect: Mood and affect normal.         Behavior: Behavior normal.             Current Outpatient Medications   Medication Sig Dispense Refill    BD BRENNA 2ND GEN PEN NEEDLE 32 gauge x 5/32" Ndle USE TO INJECT INSULIN UNDER THE SKIN EVERY DAY      bicalutamide (CASODEX) 50 MG Tab Take 1 tablet (50 mg total) by mouth once daily. 30 tablet 11    cholecalciferol, vitamin D3, 1,250 mcg (50,000 unit) capsule TAKE 1 CAPSULE BY MOUTH 1 TIME EVERY WEEK      doxycycline monohydrate 100 mg Tab Take 1 po BID pc x 10 days. Take with food (no dairy) and with plenty water. 20 tablet 0    fluticasone propionate (FLONASE) 50 mcg/actuation nasal spray 1 spray by Each Nostril route once daily. Shake Liquid and use      ketorolac 0.5% (ACULAR) 0.5 % Drop INSTILL 1 DROP IN LEFT EYE FOUR TIMES DAILY. START 3 DAYS BEFORE SURGERY      metFORMIN (GLUCOPHAGE) 1000 MG tablet       prednisoLONE acetate (PRED FORTE) 1 % DrpS SHAKE LIQUID AND INSTILL 1 DROP IN LEFT EYE FOUR TIMES DAILY AFTER SURGERY      traZODone (DESYREL) 100 MG tablet Take 100 mg by mouth nightly.      TRESIBA FLEXTOUCH U-100 100 unit/mL (3 mL) InPn INJECT 20 UNITS UNDER THE SKIN ONCE DAILY      simvastatin (ZOCOR) 40 MG tablet Take 1 tablet by mouth.      tamsulosin (FLOMAX) 0.4 mg Cap Take 1 capsule by mouth.       Current Facility-Administered Medications   Medication Dose Route Frequency Provider Last Rate Last Admin    leuprolide (6 month) (ELIGARD) injection 45 mg  45 mg Subcutaneous Q6 Months Chey Saini NP   45 mg at 11/05/21 0928       Pertinent Diagnostic studies:      Lab Visit on 01/28/2022   Component Date Value Ref Range Status    PSA Diagnostic 01/28/2022 <0.01  0.00 - 4.00 ng/mL Final    Comment: PSA Expected levels:  Hormonal Therapy: <0.05 ng/ml  Prostatectomy: <0.01 ng/ml  Radiation Therapy: " <1.00 ng/ml      Testosterone, Total 01/28/2022 21* 304 - 1227 ng/dL Final    WBC 01/28/2022 5.57  3.90 - 12.70 K/uL Final    RBC 01/28/2022 4.76  4.60 - 6.20 M/uL Final    Hemoglobin 01/28/2022 13.0* 14.0 - 18.0 g/dL Final    Hematocrit 01/28/2022 40.9  40.0 - 54.0 % Final    MCV 01/28/2022 86  82 - 98 fL Final    MCH 01/28/2022 27.3  27.0 - 31.0 pg Final    MCHC 01/28/2022 31.8* 32.0 - 36.0 g/dL Final    RDW 01/28/2022 12.6  11.5 - 14.5 % Final    Platelets 01/28/2022 268  150 - 450 K/uL Final    MPV 01/28/2022 10.5  9.2 - 12.9 fL Final    Immature Granulocytes 01/28/2022 0.5  0.0 - 0.5 % Final    Gran # (ANC) 01/28/2022 3.7  1.8 - 7.7 K/uL Final    Immature Grans (Abs) 01/28/2022 0.03  0.00 - 0.04 K/uL Final    Comment: Mild elevation in immature granulocytes is non specific and   can be seen in a variety of conditions including stress response,   acute inflammation, trauma and pregnancy. Correlation with other   laboratory and clinical findings is essential.      Lymph # 01/28/2022 1.1  1.0 - 4.8 K/uL Final    Mono # 01/28/2022 0.5  0.3 - 1.0 K/uL Final    Eos # 01/28/2022 0.3  0.0 - 0.5 K/uL Final    Baso # 01/28/2022 0.03  0.00 - 0.20 K/uL Final    nRBC 01/28/2022 0  0 /100 WBC Final    Gran % 01/28/2022 65.6  38.0 - 73.0 % Final    Lymph % 01/28/2022 18.9  18.0 - 48.0 % Final    Mono % 01/28/2022 9.3  4.0 - 15.0 % Final    Eosinophil % 01/28/2022 5.2  0.0 - 8.0 % Final    Basophil % 01/28/2022 0.5  0.0 - 1.9 % Final    Differential Method 01/28/2022 Automated   Final    Sodium 01/28/2022 139  136 - 145 mmol/L Final    Potassium 01/28/2022 4.0  3.5 - 5.1 mmol/L Final    Chloride 01/28/2022 102  95 - 110 mmol/L Final    CO2 01/28/2022 29  23 - 29 mmol/L Final    Glucose 01/28/2022 205* 70 - 110 mg/dL Final    BUN 01/28/2022 12  8 - 23 mg/dL Final    Creatinine 01/28/2022 1.1  0.5 - 1.4 mg/dL Final    Calcium 01/28/2022 10.2  8.7 - 10.5 mg/dL Final    Total Protein 01/28/2022  7.8  6.0 - 8.4 g/dL Final    Albumin 01/28/2022 3.8  3.5 - 5.2 g/dL Final    Total Bilirubin 01/28/2022 0.3  0.1 - 1.0 mg/dL Final    Comment: For infants and newborns, interpretation of results should be based  on gestational age, weight and in agreement with clinical  observations.    Premature Infant recommended reference ranges:  Up to 24 hours.............<8.0 mg/dL  Up to 48 hours............<12.0 mg/dL  3-5 days..................<15.0 mg/dL  6-29 days.................<15.0 mg/dL      Alkaline Phosphatase 01/28/2022 128  55 - 135 U/L Final    AST 01/28/2022 20  10 - 40 U/L Final    ALT 01/28/2022 22  10 - 44 U/L Final    Anion Gap 01/28/2022 8  8 - 16 mmol/L Final    eGFR if African American 01/28/2022 >60  >60 mL/min/1.73 m^2 Final    eGFR if non African American 01/28/2022 >60  >60 mL/min/1.73 m^2 Final    Comment: Calculation used to obtain the estimated glomerular filtration  rate (eGFR) is the CKD-EPI equation.            Patient Active Problem List    Diagnosis Date Noted    Prostate cancer      Active Problem List with Overview Notes    Diagnosis Date Noted    Prostate cancer          Oncology History   Prostate cancer   8/5/2020 Initial Diagnosis    Prostate cancer     8/5/2020 Cancer Staged    Staging form: Prostate, AJCC 8th Edition  - Clinical stage from 8/5/2020: Stage IIIC (cT3a, cN0, cM0, PSA: 159, Grade Group: 5)     10/1/2020 - 11/10/2020 Radiation Therapy    Treating physician: Antonio Villalobos MD   Total Dose: 50.4 Gy to pelvic nodes  70 Gy to prostate and seminal vesicles   Fractions: 28 fractions at 2.5 and 1.8 Gy per fraction      4/23/2021 Imaging Significant Findings    CT CAP  Stable appearance of the enhancing solid mass within the upper pole of the right kidney measuring 1.7 x 1.3 x 2.0 cm.  Punctate nonobstructing renal calculi noted bilaterally.  Subcentimeter renal hypodensities noted bilaterally likely representing renal cysts.  Colonic diverticulosis without evidence for  acute diverticulitis within the visualized colon       Assessment :     1. Prostate cancer    2. Renal mass    3. 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 Type 2 diabetes mellitus with retinopathy, with long-term current use of insulin, macular edema presence unspecified, unspecified laterality, unspecified retinopathy severity        Plan :     1. Prostate cancer  high risk localized prostate cancer. He is on ADT (per urology) and completed radiation on 11/10/2020  PSA negligible, d/w pt   RTC in 6 months for follow up    2. Renal lesion- stable, continue monitoring and f/u with urology. Scan reviewed.   4/23/21- CT - Stable appearance of the enhancing solid mass within the upper pole of the right kidney measuring 1.7 x 1.3 x 2.0 cm.Punctate nonobstructing renal calculi noted bilaterally.Subcentimeter renal hypodensities noted bilaterally likely representing renal cysts.Colonic diverticulosis without evidence for acute diverticulitis within the visualized colon.   repeat scan ordered by urology scheduled in April.     3. Smoking, quit 6 weeks back , CT lung screening ordered  Encouraged to continuing smoking cessation.     4. IDDM   -on meds, per pcp     Route Chart for Scheduling    Med Onc Chart Routing  Follow up with physician 6 months. Please fax note to pcp and update pcp info in chart. thanks.    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 LABDRAW [facility]   4/29/2022 10:30 AM [facility] XROP  [facility] XRAY OP [facility]   4/29/2022 11:00 AM [facility] CT OP LIMIT 450 LBS [facility] CTSCANO [facility]   5/6/2022 10:00 AM Jeff Avalos MD [facility] UROLOGY [facility]           This note was created with voice recognition software.  Grammatical, syntax and spelling errors may be inevitable.

## 2022-01-31 ENCOUNTER — OFFICE VISIT (OUTPATIENT)
Dept: HEMATOLOGY/ONCOLOGY | Facility: CLINIC | Age: 67
End: 2022-01-31
Payer: MEDICARE

## 2022-01-31 VITALS
HEART RATE: 65 BPM | RESPIRATION RATE: 18 BRPM | TEMPERATURE: 98 F | OXYGEN SATURATION: 94 % | SYSTOLIC BLOOD PRESSURE: 131 MMHG | BODY MASS INDEX: 22.6 KG/M2 | WEIGHT: 157.88 LBS | DIASTOLIC BLOOD PRESSURE: 80 MMHG | HEIGHT: 70 IN

## 2022-01-31 DIAGNOSIS — C61 PROSTATE CANCER: Primary | ICD-10-CM

## 2022-01-31 DIAGNOSIS — Z79.4 TYPE 2 DIABETES MELLITUS WITH RETINOPATHY, WITH LONG-TERM CURRENT USE OF INSULIN, MACULAR EDEMA PRESENCE UNSPECIFIED, UNSPECIFIED LATERALITY, UNSPECIFIED RETINOPATHY SEVERITY: ICD-10-CM

## 2022-01-31 DIAGNOSIS — E11.319 TYPE 2 DIABETES MELLITUS WITH RETINOPATHY, WITH LONG-TERM CURRENT USE OF INSULIN, MACULAR EDEMA PRESENCE UNSPECIFIED, UNSPECIFIED LATERALITY, UNSPECIFIED RETINOPATHY SEVERITY: ICD-10-CM

## 2022-01-31 DIAGNOSIS — N28.89 RENAL MASS: ICD-10-CM

## 2022-01-31 DIAGNOSIS — F17.211 TOBACCO DEPENDENCE DUE TO CIGARETTES, IN REMISSION: ICD-10-CM

## 2022-01-31 PROCEDURE — 99999 PR PBB SHADOW E&M-EST. PATIENT-LVL IV: CPT | Mod: PBBFAC,,, | Performed by: STUDENT IN AN ORGANIZED HEALTH CARE EDUCATION/TRAINING PROGRAM

## 2022-01-31 PROCEDURE — 3075F PR MOST RECENT SYSTOLIC BLOOD PRESS GE 130-139MM HG: ICD-10-PCS | Mod: CPTII,S$GLB,, | Performed by: STUDENT IN AN ORGANIZED HEALTH CARE EDUCATION/TRAINING PROGRAM

## 2022-01-31 PROCEDURE — 1101F PR PT FALLS ASSESS DOC 0-1 FALLS W/OUT INJ PAST YR: ICD-10-PCS | Mod: CPTII,S$GLB,, | Performed by: STUDENT IN AN ORGANIZED HEALTH CARE EDUCATION/TRAINING PROGRAM

## 2022-01-31 PROCEDURE — 3288F PR FALLS RISK ASSESSMENT DOCUMENTED: ICD-10-PCS | Mod: CPTII,S$GLB,, | Performed by: STUDENT IN AN ORGANIZED HEALTH CARE EDUCATION/TRAINING PROGRAM

## 2022-01-31 PROCEDURE — 3079F PR MOST RECENT DIASTOLIC BLOOD PRESSURE 80-89 MM HG: ICD-10-PCS | Mod: CPTII,S$GLB,, | Performed by: STUDENT IN AN ORGANIZED HEALTH CARE EDUCATION/TRAINING PROGRAM

## 2022-01-31 PROCEDURE — 3008F BODY MASS INDEX DOCD: CPT | Mod: CPTII,S$GLB,, | Performed by: STUDENT IN AN ORGANIZED HEALTH CARE EDUCATION/TRAINING PROGRAM

## 2022-01-31 PROCEDURE — 3075F SYST BP GE 130 - 139MM HG: CPT | Mod: CPTII,S$GLB,, | Performed by: STUDENT IN AN ORGANIZED HEALTH CARE EDUCATION/TRAINING PROGRAM

## 2022-01-31 PROCEDURE — 1159F PR MEDICATION LIST DOCUMENTED IN MEDICAL RECORD: ICD-10-PCS | Mod: CPTII,S$GLB,, | Performed by: STUDENT IN AN ORGANIZED HEALTH CARE EDUCATION/TRAINING PROGRAM

## 2022-01-31 PROCEDURE — 99214 PR OFFICE/OUTPT VISIT, EST, LEVL IV, 30-39 MIN: ICD-10-PCS | Mod: S$GLB,,, | Performed by: STUDENT IN AN ORGANIZED HEALTH CARE EDUCATION/TRAINING PROGRAM

## 2022-01-31 PROCEDURE — 3008F PR BODY MASS INDEX (BMI) DOCUMENTED: ICD-10-PCS | Mod: CPTII,S$GLB,, | Performed by: STUDENT IN AN ORGANIZED HEALTH CARE EDUCATION/TRAINING PROGRAM

## 2022-01-31 PROCEDURE — 1126F PR PAIN SEVERITY QUANTIFIED, NO PAIN PRESENT: ICD-10-PCS | Mod: CPTII,S$GLB,, | Performed by: STUDENT IN AN ORGANIZED HEALTH CARE EDUCATION/TRAINING PROGRAM

## 2022-01-31 PROCEDURE — 3079F DIAST BP 80-89 MM HG: CPT | Mod: CPTII,S$GLB,, | Performed by: STUDENT IN AN ORGANIZED HEALTH CARE EDUCATION/TRAINING PROGRAM

## 2022-01-31 PROCEDURE — 1101F PT FALLS ASSESS-DOCD LE1/YR: CPT | Mod: CPTII,S$GLB,, | Performed by: STUDENT IN AN ORGANIZED HEALTH CARE EDUCATION/TRAINING PROGRAM

## 2022-01-31 PROCEDURE — 1159F MED LIST DOCD IN RCRD: CPT | Mod: CPTII,S$GLB,, | Performed by: STUDENT IN AN ORGANIZED HEALTH CARE EDUCATION/TRAINING PROGRAM

## 2022-01-31 PROCEDURE — 99214 OFFICE O/P EST MOD 30 MIN: CPT | Mod: S$GLB,,, | Performed by: STUDENT IN AN ORGANIZED HEALTH CARE EDUCATION/TRAINING PROGRAM

## 2022-01-31 PROCEDURE — 1126F AMNT PAIN NOTED NONE PRSNT: CPT | Mod: CPTII,S$GLB,, | Performed by: STUDENT IN AN ORGANIZED HEALTH CARE EDUCATION/TRAINING PROGRAM

## 2022-01-31 PROCEDURE — 3288F FALL RISK ASSESSMENT DOCD: CPT | Mod: CPTII,S$GLB,, | Performed by: STUDENT IN AN ORGANIZED HEALTH CARE EDUCATION/TRAINING PROGRAM

## 2022-01-31 PROCEDURE — 99999 PR PBB SHADOW E&M-EST. PATIENT-LVL IV: ICD-10-PCS | Mod: PBBFAC,,, | Performed by: STUDENT IN AN ORGANIZED HEALTH CARE EDUCATION/TRAINING PROGRAM

## 2022-03-29 ENCOUNTER — TELEPHONE (OUTPATIENT)
Dept: HEMATOLOGY/ONCOLOGY | Facility: CLINIC | Age: 67
End: 2022-03-29
Payer: MEDICARE

## 2022-03-29 NOTE — TELEPHONE ENCOUNTER
----- Message from Shelia Castano sent at 3/29/2022  2:48 PM CDT -----  Regarding: CT scan  Contact: ISAI FLOR [[id number]]  Name of Who is Calling:ISAI FLOR [[id number]]          What is the request in detail:Patient calling in regards to when he will be scheduled for his CT scan. Please advise          Can the clinic reply by [facility]:no          What Number to Call Back if not in [place]:[phone]

## 2022-03-29 NOTE — TELEPHONE ENCOUNTER
Returned call and spoke with Mr Robles. Mr Robles calling to check on the date of the Ct Scan appointment Dr Yang has ordered. Informed Mr Robles of all his up coming appointment on April 29. Appointment and prep instructions information also mailed to Mr Robles home.

## 2022-04-27 DIAGNOSIS — N28.89 RENAL MASS: Primary | ICD-10-CM

## 2022-04-29 ENCOUNTER — HOSPITAL ENCOUNTER (OUTPATIENT)
Dept: RADIOLOGY | Facility: OTHER | Age: 67
Discharge: HOME | End: 2022-04-29
Attending: NURSE PRACTITIONER
Payer: MEDICARE

## 2022-04-29 ENCOUNTER — HOSPITAL ENCOUNTER (OUTPATIENT)
Dept: RADIOLOGY | Facility: OTHER | Age: 67
Discharge: HOME | End: 2022-04-29
Attending: STUDENT IN AN ORGANIZED HEALTH CARE EDUCATION/TRAINING PROGRAM
Payer: MEDICARE

## 2022-04-29 DIAGNOSIS — N28.89 RENAL MASS: ICD-10-CM

## 2022-04-29 DIAGNOSIS — F17.211 TOBACCO DEPENDENCE DUE TO CIGARETTES, IN REMISSION: ICD-10-CM

## 2022-04-29 PROCEDURE — 71046 X-RAY EXAM CHEST 2 VIEWS: CPT | Mod: TC,FY

## 2022-04-29 PROCEDURE — 25500020 PHARM REV CODE 255: Performed by: NURSE PRACTITIONER

## 2022-04-29 PROCEDURE — 71046 X-RAY EXAM CHEST 2 VIEWS: CPT | Mod: 26,,, | Performed by: RADIOLOGY

## 2022-04-29 PROCEDURE — 71046 XR CHEST PA AND LATERAL: ICD-10-PCS | Mod: 26,,, | Performed by: RADIOLOGY

## 2022-04-29 PROCEDURE — 71271 CT THORAX LUNG CANCER SCR C-: CPT | Mod: 26,,, | Performed by: RADIOLOGY

## 2022-04-29 PROCEDURE — 71271 CT CHEST LUNG SCREENING LOW DOSE: ICD-10-PCS | Mod: 26,,, | Performed by: RADIOLOGY

## 2022-04-29 PROCEDURE — 74170 CT ABD WO CNTRST FLWD CNTRST: CPT | Mod: TC

## 2022-04-29 PROCEDURE — 74170 CT ABDOMEN W WO CONTRAST: ICD-10-PCS | Mod: 26,,, | Performed by: RADIOLOGY

## 2022-04-29 PROCEDURE — 71271 CT THORAX LUNG CANCER SCR C-: CPT | Mod: TC

## 2022-04-29 PROCEDURE — 74170 CT ABD WO CNTRST FLWD CNTRST: CPT | Mod: 26,,, | Performed by: RADIOLOGY

## 2022-04-29 RX ADMIN — IOHEXOL 75 ML: 350 INJECTION, SOLUTION INTRAVENOUS at 10:04

## 2022-05-04 ENCOUNTER — TELEPHONE (OUTPATIENT)
Dept: HEMATOLOGY/ONCOLOGY | Facility: CLINIC | Age: 67
End: 2022-05-04
Payer: MEDICARE

## 2022-05-04 NOTE — TELEPHONE ENCOUNTER
Called pt in regards to abnormal ct scan. Pt's number has been unreachable. Spoke with daughter, they had a death in family and pt is unable to talk.   Clinic number provided and requested pt to call  Back.     Staff msg sent to Dr Bro.

## 2022-05-05 ENCOUNTER — TELEPHONE (OUTPATIENT)
Dept: UROLOGY | Facility: CLINIC | Age: 67
End: 2022-05-05
Payer: MEDICARE

## 2022-05-05 ENCOUNTER — TELEPHONE (OUTPATIENT)
Dept: HEMATOLOGY/ONCOLOGY | Facility: CLINIC | Age: 67
End: 2022-05-05
Payer: MEDICARE

## 2022-05-05 NOTE — TELEPHONE ENCOUNTER
Received a call from Mr. Robles.  Notified patient and his daughter of the referral to Pulmonary.  Appointment scheduled for 05/11/22.  Agreeable to date and time.  Address updated.  Reminder mailed.

## 2022-05-11 ENCOUNTER — HOSPITAL ENCOUNTER (OUTPATIENT)
Dept: PULMONOLOGY | Facility: CLINIC | Age: 67
Discharge: HOME | End: 2022-05-11
Payer: MEDICARE

## 2022-05-11 ENCOUNTER — OFFICE VISIT (OUTPATIENT)
Dept: PULMONOLOGY | Facility: CLINIC | Age: 67
End: 2022-05-11
Payer: MEDICARE

## 2022-05-11 ENCOUNTER — HOSPITAL ENCOUNTER (OUTPATIENT)
Dept: CARDIOLOGY | Facility: CLINIC | Age: 67
Discharge: HOME | End: 2022-05-11
Payer: MEDICARE

## 2022-05-11 VITALS
HEART RATE: 73 BPM | HEIGHT: 70 IN | WEIGHT: 161.38 LBS | SYSTOLIC BLOOD PRESSURE: 150 MMHG | DIASTOLIC BLOOD PRESSURE: 86 MMHG | BODY MASS INDEX: 23.1 KG/M2 | OXYGEN SATURATION: 96 %

## 2022-05-11 DIAGNOSIS — R91.1 SOLITARY PULMONARY NODULE: ICD-10-CM

## 2022-05-11 DIAGNOSIS — R91.1 PULMONARY NODULE: ICD-10-CM

## 2022-05-11 DIAGNOSIS — R06.00 DYSPNEA, UNSPECIFIED TYPE: ICD-10-CM

## 2022-05-11 DIAGNOSIS — J43.2 CENTRILOBULAR EMPHYSEMA: ICD-10-CM

## 2022-05-11 DIAGNOSIS — J30.89 ALLERGIC RHINITIS DUE TO OTHER ALLERGIC TRIGGER, UNSPECIFIED SEASONALITY: ICD-10-CM

## 2022-05-11 DIAGNOSIS — F17.200 SMOKER: ICD-10-CM

## 2022-05-11 DIAGNOSIS — R06.00 DYSPNEA, UNSPECIFIED TYPE: Primary | ICD-10-CM

## 2022-05-11 LAB
DLCO SINGLE BREATH LLN: 21.69
DLCO SINGLE BREATH PRE REF: 26 %
DLCO SINGLE BREATH REF: 28.62
DLCOC SBVA LLN: 2.78
DLCOC SBVA REF: 3.9
DLCOC SINGLE BREATH LLN: 21.69
DLCOC SINGLE BREATH REF: 28.62
DLCOCSBVAULN: 5.03
DLCOCSINGLEBREATHULN: 35.54
DLCOSINGLEBREATHULN: 35.54
DLCOVA LLN: 2.78
DLCOVA PRE REF: 54.2 %
DLCOVA PRE: 2.12 ML/(MIN*MMHG*L) (ref 2.78–5.03)
DLCOVA REF: 3.9
DLCOVAULN: 5.03
FEF 25 75 LLN: 0.84
FEF 25 75 PRE REF: 26.3 %
FEF 25 75 REF: 2.27
FET100 CHG: -3.6 %
FEV05 LLN: 1.65
FEV05 REF: 2.79
FEV1 CHG: 15.1 %
FEV1 FVC LLN: 64
FEV1 FVC PRE REF: 70.1 %
FEV1 FVC REF: 77
FEV1 LLN: 2.02
FEV1 PRE REF: 41.8 %
FEV1 REF: 2.9
FEV1 VOL CHG: 0.18
FVC CHG: 11.9 %
FVC LLN: 2.75
FVC PRE REF: 59.5 %
FVC REF: 3.78
FVC VOL CHG: 0.27
IVC PRE: 2.29 L (ref 2.75–4.84)
IVC SINGLE BREATH LLN: 2.75
IVC SINGLE BREATH PRE REF: 60.6 %
IVC SINGLE BREATH REF: 3.78
IVCSINGLEBREATHULN: 4.84
PEF LLN: 5.65
PEF PRE REF: 39.4 %
PEF REF: 8.38
PHYSICIAN COMMENT: ABNORMAL
POST FEF 25 75: 0.57 L/S (ref 0.84–4.39)
POST FET 100: 6.06 SEC
POST FEV1 FVC: 55.28 % (ref 64.06–87.91)
POST FEV1: 1.39 L (ref 2.02–3.71)
POST FEV5: 0.86 L (ref 1.65–3.92)
POST FVC: 2.52 L (ref 2.75–4.84)
POST PEF: 3.32 L/S (ref 5.65–11.11)
PRE DLCO: 7.45 ML/(MIN*MMHG) (ref 21.69–35.54)
PRE FEF 25 75: 0.6 L/S (ref 0.84–4.39)
PRE FET 100: 6.29 SEC
PRE FEV05 REF: 31.2 %
PRE FEV1 FVC: 53.74 % (ref 64.06–87.91)
PRE FEV1: 1.21 L (ref 2.02–3.71)
PRE FEV5: 0.87 L (ref 1.65–3.92)
PRE FVC: 2.25 L (ref 2.75–4.84)
PRE PEF: 3.3 L/S (ref 5.65–11.11)
VA PRE: 3.52 L (ref 7.18–7.18)
VA SINGLE BREATH PRE REF: 49 %
VA SINGLE BREATH REF: 7.18

## 2022-05-11 PROCEDURE — 3079F PR MOST RECENT DIASTOLIC BLOOD PRESSURE 80-89 MM HG: ICD-10-PCS | Mod: CPTII,S$GLB,, | Performed by: INTERNAL MEDICINE

## 2022-05-11 PROCEDURE — 1160F RVW MEDS BY RX/DR IN RCRD: CPT | Mod: CPTII,S$GLB,, | Performed by: INTERNAL MEDICINE

## 2022-05-11 PROCEDURE — 93010 EKG 12-LEAD: ICD-10-PCS | Mod: S$GLB,,, | Performed by: INTERNAL MEDICINE

## 2022-05-11 PROCEDURE — 3288F FALL RISK ASSESSMENT DOCD: CPT | Mod: CPTII,S$GLB,, | Performed by: INTERNAL MEDICINE

## 2022-05-11 PROCEDURE — 3079F DIAST BP 80-89 MM HG: CPT | Mod: CPTII,S$GLB,, | Performed by: INTERNAL MEDICINE

## 2022-05-11 PROCEDURE — 99999 PR PBB SHADOW E&M-EST. PATIENT-LVL IV: ICD-10-PCS | Mod: PBBFAC,,, | Performed by: INTERNAL MEDICINE

## 2022-05-11 PROCEDURE — 3077F SYST BP >= 140 MM HG: CPT | Mod: CPTII,S$GLB,, | Performed by: INTERNAL MEDICINE

## 2022-05-11 PROCEDURE — 1159F MED LIST DOCD IN RCRD: CPT | Mod: CPTII,S$GLB,, | Performed by: INTERNAL MEDICINE

## 2022-05-11 PROCEDURE — 1126F AMNT PAIN NOTED NONE PRSNT: CPT | Mod: CPTII,S$GLB,, | Performed by: INTERNAL MEDICINE

## 2022-05-11 PROCEDURE — 3077F PR MOST RECENT SYSTOLIC BLOOD PRESSURE >= 140 MM HG: ICD-10-PCS | Mod: CPTII,S$GLB,, | Performed by: INTERNAL MEDICINE

## 2022-05-11 PROCEDURE — 3008F BODY MASS INDEX DOCD: CPT | Mod: CPTII,S$GLB,, | Performed by: INTERNAL MEDICINE

## 2022-05-11 PROCEDURE — 94060 PR EVAL OF BRONCHOSPASM: ICD-10-PCS | Mod: S$GLB,,, | Performed by: INTERNAL MEDICINE

## 2022-05-11 PROCEDURE — 93010 ELECTROCARDIOGRAM REPORT: CPT | Mod: S$GLB,,, | Performed by: INTERNAL MEDICINE

## 2022-05-11 PROCEDURE — 99999 PR PBB SHADOW E&M-EST. PATIENT-LVL IV: CPT | Mod: PBBFAC,,, | Performed by: INTERNAL MEDICINE

## 2022-05-11 PROCEDURE — 1159F PR MEDICATION LIST DOCUMENTED IN MEDICAL RECORD: ICD-10-PCS | Mod: CPTII,S$GLB,, | Performed by: INTERNAL MEDICINE

## 2022-05-11 PROCEDURE — 3288F PR FALLS RISK ASSESSMENT DOCUMENTED: ICD-10-PCS | Mod: CPTII,S$GLB,, | Performed by: INTERNAL MEDICINE

## 2022-05-11 PROCEDURE — 3008F PR BODY MASS INDEX (BMI) DOCUMENTED: ICD-10-PCS | Mod: CPTII,S$GLB,, | Performed by: INTERNAL MEDICINE

## 2022-05-11 PROCEDURE — 1160F PR REVIEW ALL MEDS BY PRESCRIBER/CLIN PHARMACIST DOCUMENTED: ICD-10-PCS | Mod: CPTII,S$GLB,, | Performed by: INTERNAL MEDICINE

## 2022-05-11 PROCEDURE — 99204 OFFICE O/P NEW MOD 45 MIN: CPT | Mod: 25,S$GLB,, | Performed by: INTERNAL MEDICINE

## 2022-05-11 PROCEDURE — 93005 EKG 12-LEAD: ICD-10-PCS | Mod: S$GLB,,, | Performed by: INTERNAL MEDICINE

## 2022-05-11 PROCEDURE — 1101F PR PT FALLS ASSESS DOC 0-1 FALLS W/OUT INJ PAST YR: ICD-10-PCS | Mod: CPTII,S$GLB,, | Performed by: INTERNAL MEDICINE

## 2022-05-11 PROCEDURE — 1126F PR PAIN SEVERITY QUANTIFIED, NO PAIN PRESENT: ICD-10-PCS | Mod: CPTII,S$GLB,, | Performed by: INTERNAL MEDICINE

## 2022-05-11 PROCEDURE — 94060 EVALUATION OF WHEEZING: CPT | Mod: S$GLB,,, | Performed by: INTERNAL MEDICINE

## 2022-05-11 PROCEDURE — 94729 DIFFUSING CAPACITY: CPT | Mod: S$GLB,,, | Performed by: INTERNAL MEDICINE

## 2022-05-11 PROCEDURE — 1101F PT FALLS ASSESS-DOCD LE1/YR: CPT | Mod: CPTII,S$GLB,, | Performed by: INTERNAL MEDICINE

## 2022-05-11 PROCEDURE — 99204 PR OFFICE/OUTPT VISIT, NEW, LEVL IV, 45-59 MIN: ICD-10-PCS | Mod: 25,S$GLB,, | Performed by: INTERNAL MEDICINE

## 2022-05-11 PROCEDURE — 94729 PR C02/MEMBANE DIFFUSE CAPACITY: ICD-10-PCS | Mod: S$GLB,,, | Performed by: INTERNAL MEDICINE

## 2022-05-11 PROCEDURE — 93005 ELECTROCARDIOGRAM TRACING: CPT | Mod: S$GLB,,, | Performed by: INTERNAL MEDICINE

## 2022-05-11 RX ORDER — LEVOCETIRIZINE DIHYDROCHLORIDE 5 MG/1
5 TABLET, FILM COATED ORAL NIGHTLY
Qty: 30 TABLET | Refills: 11 | Status: SHIPPED | OUTPATIENT
Start: 2022-05-11 | End: 2023-05-11

## 2022-05-11 RX ORDER — AMOXICILLIN AND CLAVULANATE POTASSIUM 875; 125 MG/1; MG/1
1 TABLET, FILM COATED ORAL 2 TIMES DAILY
Qty: 14 TABLET | Refills: 0 | Status: SHIPPED | OUTPATIENT
Start: 2022-05-11 | End: 2022-05-18

## 2022-05-11 RX ORDER — FLUTICASONE PROPIONATE 50 MCG
2 SPRAY, SUSPENSION (ML) NASAL DAILY
Qty: 16 G | Refills: 6 | Status: SHIPPED | OUTPATIENT
Start: 2022-05-11 | End: 2022-06-10

## 2022-05-11 NOTE — Clinical Note
Please call to let him know that I have called in an inhaler for COPD.  One for control to be used daily (anoro) and albuterol  CT at the end of July.  Thanks, Laney

## 2022-05-13 NOTE — PROGRESS NOTES
"Subjective:       Patient ID: Lan Robles is a 66 y.o. male.    Consult from Dr. Yang for pulmonary nodules    Chief Complaint: Pulmonary Nodules    66 year old current smoker but trying to quit with a h/o prostate cancer and renal mass.  Referred to me for abnormal CT of chest for lung cancer screening.  Patient does endorse dyspnea on exertion.  Morning cough productive of clear phlegm.  Occasional chest tightness.  Denies lower extremity edema.  No cardiac history.  History of diabetes.      Complaining of sinus congestion and headache with cough and increased sputum production.    Review of Systems   Constitutional: Negative for fever, weight loss and weight gain.   HENT: Negative for trouble swallowing.    Eyes: Negative for itching.   Genitourinary: Negative for difficulty urinating.   Endocrine: Negative for cold intolerance and heat intolerance.    Musculoskeletal: Negative for arthralgias.   Gastrointestinal: Negative for acid reflux.   Hematological: Negative for adenopathy.   Psychiatric/Behavioral: Negative for confusion.       Past medical and surgical history reviewed.  Social and family history reviewed.  Allergies and medications reviewed.  Objective:       Vitals:    05/11/22 0946 05/11/22 0954   BP: (!) 150/86    BP Location: Right arm    Patient Position: Sitting    Pulse: 73    SpO2: (!) 92% 96%   Weight: 73.2 kg (161 lb 6 oz)    Height: 5' 10" (1.778 m)      Physical Exam   Constitutional: He is oriented to person, place, and time. He appears well-developed and well-nourished. No distress.   HENT:   Head: Normocephalic.   Right Ear: External ear normal.   Left Ear: External ear normal.   Nose: Nose normal.   Mouth/Throat: Oropharynx is clear and moist.   Neck: No tracheal deviation present. No thyromegaly present.   Cardiovascular: Normal rate, regular rhythm, normal heart sounds and intact distal pulses.   Pulmonary/Chest: Normal expansion and symmetric chest wall expansion. He has no " "wheezes. He has no rales. He exhibits no tenderness.   Abdominal: Soft. Bowel sounds are normal. He exhibits no distension and no mass. There is no hepatosplenomegaly. There is no abdominal tenderness.   Musculoskeletal:         General: Normal range of motion.      Cervical back: Normal range of motion and neck supple.   Lymphadenopathy: No supraclavicular adenopathy is present.     He has no cervical adenopathy.   Neurological: He is alert and oriented to person, place, and time. No cranial nerve deficit.   Psychiatric: He has a normal mood and affect.        Personal Diagnostic Review  CT of chest performed on 4/29/22 without contrast revealed Lungs: There are abnormal opacities that require further evaluation.  The largest opacity in the right lung is irregular and solid and measures 1.6 cm x 1.7 cm on series 4, image 256.  There are multiple pleural based 3-4 mm nodules throughout the left lung.  For reference, the 4 mm nodule is identified on series 4, image 216. The lungs show findings consistent with emphysema.    Post clinic PFTs with obstruction.  .  No flowsheet data found.      Assessment:       1. Dyspnea, unspecified type    2. Smoker    3. Pulmonary nodule    4. Allergic rhinitis due to other allergic trigger, unspecified seasonality    5. Solitary pulmonary nodule    6. Centrilobular emphysema        Outpatient Encounter Medications as of 5/11/2022   Medication Sig Dispense Refill    BD BRENNA 2ND GEN PEN NEEDLE 32 gauge x 5/32" Ndle USE TO INJECT INSULIN UNDER THE SKIN EVERY DAY      bicalutamide (CASODEX) 50 MG Tab Take 1 tablet (50 mg total) by mouth once daily. 30 tablet 11    cholecalciferol, vitamin D3, 1,250 mcg (50,000 unit) capsule TAKE 1 CAPSULE BY MOUTH 1 TIME EVERY WEEK      doxycycline monohydrate 100 mg Tab Take 1 po BID pc x 10 days. Take with food (no dairy) and with plenty water. 20 tablet 0    ketorolac 0.5% (ACULAR) 0.5 % Drop INSTILL 1 DROP IN LEFT EYE FOUR TIMES DAILY. START 3 " DAYS BEFORE SURGERY      metFORMIN (GLUCOPHAGE) 1000 MG tablet       prednisoLONE acetate (PRED FORTE) 1 % DrpS SHAKE LIQUID AND INSTILL 1 DROP IN LEFT EYE FOUR TIMES DAILY AFTER SURGERY      traZODone (DESYREL) 100 MG tablet Take 100 mg by mouth nightly.      TRESIBA FLEXTOUCH U-100 100 unit/mL (3 mL) InPn INJECT 20 UNITS UNDER THE SKIN ONCE DAILY      [DISCONTINUED] fluticasone propionate (FLONASE) 50 mcg/actuation nasal spray 1 spray by Each Nostril route once daily. Shake Liquid and use      albuterol (PROVENTIL/VENTOLIN HFA) 90 mcg/actuation inhaler Inhale 2 puffs into the lungs every 6 (six) hours as needed. 18 g 11    amoxicillin-clavulanate 875-125mg (AUGMENTIN) 875-125 mg per tablet Take 1 tablet by mouth 2 (two) times daily. for 7 days 14 tablet 0    fluticasone propionate (FLONASE) 50 mcg/actuation nasal spray 2 sprays (100 mcg total) by Each Nostril route once daily. 16 g 6    levocetirizine (XYZAL) 5 MG tablet Take 1 tablet (5 mg total) by mouth every evening. 30 tablet 11    simvastatin (ZOCOR) 40 MG tablet Take 1 tablet by mouth.      tamsulosin (FLOMAX) 0.4 mg Cap Take 1 capsule by mouth.      umeclidinium-vilanteroL (ANORO ELLIPTA) 62.5-25 mcg/actuation DsDv Inhale 1 puff into the lungs once daily. Controller 1 each 5     Facility-Administered Encounter Medications as of 5/11/2022   Medication Dose Route Frequency Provider Last Rate Last Admin    leuprolide (6 month) (ELIGARD) injection 45 mg  45 mg Subcutaneous Q6 Months Chey Saini NP   45 mg at 11/05/21 0928     Orders Placed This Encounter   Procedures    CT Chest Without Contrast     Chest Navigational Bronchoscopy     Standing Status:   Future     Standing Expiration Date:   5/11/2023     Scheduling Instructions:      Chest Navigational Bronchoscopy     Order Specific Question:   May the Radiologist modify the order per protocol to meet the clinical needs of the patient?     Answer:   Yes    EKG 12-lead     Standing  Status:   Future     Number of Occurrences:   1     Standing Expiration Date:   5/11/2023    Spirometry with/without bronchodilator     Standing Status:   Future     Number of Occurrences:   1     Standing Expiration Date:   5/11/2023     Order Specific Question:   Release to patient     Answer:   Immediate    DLCO-Carbon Monoxide Diffusing Capacity     Standing Status:   Future     Number of Occurrences:   1     Standing Expiration Date:   5/11/2023     Order Specific Question:   Release to patient     Answer:   Immediate     Plan:     Problem List Items Addressed This Visit     Smoker    Overview     Motivated to quit           Relevant Orders    EKG 12-lead (Completed)    Spirometry with/without bronchodilator (Completed)    DLCO-Carbon Monoxide Diffusing Capacity    Dyspnea - Primary    Overview     PFTs with obstruction  EKG NSR, normal           Relevant Orders    EKG 12-lead (Completed)    Spirometry with/without bronchodilator (Completed)    DLCO-Carbon Monoxide Diffusing Capacity    Pulmonary nodule    Overview     Multiple, the largest was less than 2 cm.  Will treat for infectious etiology and follow up in three months as per guidelines.               Relevant Orders    EKG 12-lead (Completed)    Spirometry with/without bronchodilator (Completed)    DLCO-Carbon Monoxide Diffusing Capacity    CT Chest Without Contrast    Centrilobular emphysema    Overview     Post clinic PFTs with obstruction.  Will recommend starting controller with anoro daily.  Albuterol for rescue and prevention           Relevant Medications    umeclidinium-vilanteroL (ANORO ELLIPTA) 62.5-25 mcg/actuation DsDv    albuterol (PROVENTIL/VENTOLIN HFA) 90 mcg/actuation inhaler      Other Visit Diagnoses     Allergic rhinitis due to other allergic trigger, unspecified seasonality        Relevant Medications    levocetirizine (XYZAL) 5 MG tablet    fluticasone propionate (FLONASE) 50 mcg/actuation nasal spray    Solitary pulmonary nodule         Relevant Orders    CT Chest Without Contrast

## 2022-05-17 ENCOUNTER — TELEPHONE (OUTPATIENT)
Dept: PULMONOLOGY | Facility: CLINIC | Age: 67
End: 2022-05-17
Payer: MEDICARE

## 2022-05-17 PROBLEM — J43.2 CENTRILOBULAR EMPHYSEMA: Status: ACTIVE | Noted: 2022-05-17

## 2022-05-17 RX ORDER — UMECLIDINIUM BROMIDE AND VILANTEROL TRIFENATATE 62.5; 25 UG/1; UG/1
1 POWDER RESPIRATORY (INHALATION) DAILY
Qty: 1 EACH | Refills: 5 | Status: SHIPPED | OUTPATIENT
Start: 2022-05-17

## 2022-05-17 RX ORDER — ALBUTEROL SULFATE 90 UG/1
2 AEROSOL, METERED RESPIRATORY (INHALATION) EVERY 6 HOURS PRN
Qty: 18 G | Refills: 11 | Status: SHIPPED | OUTPATIENT
Start: 2022-05-17

## 2022-05-17 NOTE — TELEPHONE ENCOUNTER
----- Message from Laney Bro MD sent at 5/17/2022  6:39 AM CDT -----  Please call to let him know that I have called in an inhaler for COPD.  One for control to be used daily (anoro) and albuterol    CT at the end of July.    Thanks,  Laney

## 2022-05-17 NOTE — TELEPHONE ENCOUNTER
I spoke with Mr Robles to let him know that Dr Bro called in an inhaler for COPD. One for control to be used daily (anoro) and albuterol. I scheduled CT at the end of July on 7/29/22 at 10:30am at [facility] per Dr Bro. Appointment mailed. Patient confirmed and verbalized understanding.

## 2022-07-29 ENCOUNTER — HOSPITAL ENCOUNTER (OUTPATIENT)
Dept: RADIOLOGY | Facility: OTHER | Age: 67
Discharge: HOME | End: 2022-07-29
Attending: INTERNAL MEDICINE
Payer: MEDICARE

## 2022-07-29 DIAGNOSIS — R91.1 PULMONARY NODULE: ICD-10-CM

## 2022-07-29 DIAGNOSIS — R91.1 SOLITARY PULMONARY NODULE: ICD-10-CM

## 2022-07-29 PROCEDURE — 71250 CT THORAX DX C-: CPT | Mod: TC

## 2022-07-29 PROCEDURE — 71250 CT CHEST WITHOUT CONTRAST: ICD-10-PCS | Mod: 26,,, | Performed by: RADIOLOGY

## 2022-07-29 PROCEDURE — 71250 CT THORAX DX C-: CPT | Mod: 26,,, | Performed by: RADIOLOGY

## 2022-08-01 ENCOUNTER — TELEPHONE (OUTPATIENT)
Dept: PULMONOLOGY | Facility: CLINIC | Age: 67
End: 2022-08-01
Payer: MEDICARE

## 2022-08-01 DIAGNOSIS — R91.1 PULMONARY NODULE: Primary | ICD-10-CM

## 2022-08-01 NOTE — TELEPHONE ENCOUNTER
Spoke to daughter Claire regarding CT results.  Persistent and slightly increased in size of pulmonary nodules.  Robotic bronchoscopy case request placed for 8/12.  Will call daughter to princess.

## 2022-08-02 ENCOUNTER — PATIENT MESSAGE (OUTPATIENT)
Dept: PULMONOLOGY | Facility: CLINIC | Age: 67
End: 2022-08-02
Payer: MEDICARE

## 2022-08-11 ENCOUNTER — TELEPHONE (OUTPATIENT)
Dept: PULMONOLOGY | Facility: CLINIC | Age: 67
End: 2022-08-11
Payer: MEDICARE

## 2022-08-11 ENCOUNTER — TELEPHONE (OUTPATIENT)
Dept: UROLOGY | Facility: CLINIC | Age: 67
End: 2022-08-11
Payer: MEDICARE

## 2022-08-11 DIAGNOSIS — C61 PROSTATE CANCER: Primary | ICD-10-CM

## 2022-08-11 NOTE — TELEPHONE ENCOUNTER
I spoke with patient to let him know arrival [facility] to [facility] for [facility]am tomorrow with Dr Bro for robotic bronchoscopy. Patient confirmed and verbalized understanding.

## 2022-08-12 ENCOUNTER — HOSPITAL ENCOUNTER (OUTPATIENT)
Facility: HOSPITAL | Age: 67
Discharge: HOME | End: 2022-08-12
Attending: INTERNAL MEDICINE | Admitting: INTERNAL MEDICINE
Payer: MEDICARE

## 2022-08-12 ENCOUNTER — ANESTHESIA (OUTPATIENT)
Dept: SURGERY | Facility: HOSPITAL | Age: 67
End: 2022-08-12
Payer: MEDICARE

## 2022-08-12 ENCOUNTER — ANESTHESIA EVENT (OUTPATIENT)
Dept: SURGERY | Facility: HOSPITAL | Age: 67
End: 2022-08-12
Payer: MEDICARE

## 2022-08-12 VITALS
RESPIRATION RATE: 16 BRPM | OXYGEN SATURATION: 93 % | DIASTOLIC BLOOD PRESSURE: 75 MMHG | TEMPERATURE: 98 F | BODY MASS INDEX: 22.76 KG/M2 | WEIGHT: 159 LBS | HEIGHT: 70 IN | SYSTOLIC BLOOD PRESSURE: 137 MMHG | HEART RATE: 85 BPM

## 2022-08-12 DIAGNOSIS — R91.1 PULMONARY NODULE: ICD-10-CM

## 2022-08-12 LAB
POCT GLUCOSE: 260 MG/DL (ref 70–110)
POCT GLUCOSE: 298 MG/DL (ref 70–110)

## 2022-08-12 PROCEDURE — D9220A PRA ANESTHESIA: Mod: CRNA,,, | Performed by: STUDENT IN AN ORGANIZED HEALTH CARE EDUCATION/TRAINING PROGRAM

## 2022-08-12 PROCEDURE — 88172 PR  EVALUATION OF FNA SMEAR TO DETERMINE ADEQUACY, FIRST EVAL: ICD-10-PCS | Mod: 26,,, | Performed by: PATHOLOGY

## 2022-08-12 PROCEDURE — 31654 PR BRONCH W/ EBUS, DIAG OR THERA INTERVENTION PERIPHERAL LESION(S), INCL GUID, ADD ON CODE: ICD-10-PCS | Mod: ,,, | Performed by: INTERNAL MEDICINE

## 2022-08-12 PROCEDURE — 71000015 HC POSTOP RECOV 1ST HR: Performed by: INTERNAL MEDICINE

## 2022-08-12 PROCEDURE — 25000003 PHARM REV CODE 250: Performed by: STUDENT IN AN ORGANIZED HEALTH CARE EDUCATION/TRAINING PROGRAM

## 2022-08-12 PROCEDURE — 31653 BRONCH EBUS SAMPLNG 3/> NODE: CPT | Mod: ,,, | Performed by: INTERNAL MEDICINE

## 2022-08-12 PROCEDURE — 88172 CYTP DX EVAL FNA 1ST EA SITE: CPT | Performed by: PATHOLOGY

## 2022-08-12 PROCEDURE — D9220A PRA ANESTHESIA: ICD-10-PCS | Mod: CRNA,,, | Performed by: STUDENT IN AN ORGANIZED HEALTH CARE EDUCATION/TRAINING PROGRAM

## 2022-08-12 PROCEDURE — 37000009 HC ANESTHESIA EA ADD 15 MINS: Performed by: INTERNAL MEDICINE

## 2022-08-12 PROCEDURE — 31627 NAVIGATIONAL BRONCHOSCOPY: CPT | Mod: ,,, | Performed by: INTERNAL MEDICINE

## 2022-08-12 PROCEDURE — 88305 TISSUE EXAM BY PATHOLOGIST: CPT | Mod: 26,,, | Performed by: PATHOLOGY

## 2022-08-12 PROCEDURE — 31629 PR BRONCHOSCOPY,TRANSBRON ASPIR BX: ICD-10-PCS | Mod: 59,RT,, | Performed by: INTERNAL MEDICINE

## 2022-08-12 PROCEDURE — 36000708 HC OR TIME LEV III 1ST 15 MIN: Performed by: INTERNAL MEDICINE

## 2022-08-12 PROCEDURE — 71000045 HC DOSC ROUTINE RECOVERY EA ADD'L HR: Performed by: INTERNAL MEDICINE

## 2022-08-12 PROCEDURE — 31623 DX BRONCHOSCOPE/BRUSH: CPT | Mod: 51,RT,, | Performed by: INTERNAL MEDICINE

## 2022-08-12 PROCEDURE — C1726 CATH, BAL DIL, NON-VASCULAR: HCPCS | Performed by: INTERNAL MEDICINE

## 2022-08-12 PROCEDURE — D9220A PRA ANESTHESIA: Mod: ANES,,, | Performed by: ANESTHESIOLOGY

## 2022-08-12 PROCEDURE — 31632 BRONCHOSCOPY/LUNG BX ADDL: CPT | Mod: ,,, | Performed by: INTERNAL MEDICINE

## 2022-08-12 PROCEDURE — 88172 CYTP DX EVAL FNA 1ST EA SITE: CPT | Mod: 26,,, | Performed by: PATHOLOGY

## 2022-08-12 PROCEDURE — 31633 BRONCHOSCOPY/NEEDLE BX ADDL: CPT | Mod: ,,, | Performed by: INTERNAL MEDICINE

## 2022-08-12 PROCEDURE — 31633 PR BRONCHOSCOPY/NEEDLE BX ADD'L: ICD-10-PCS | Mod: ,,, | Performed by: INTERNAL MEDICINE

## 2022-08-12 PROCEDURE — 63600175 PHARM REV CODE 636 W HCPCS: Performed by: STUDENT IN AN ORGANIZED HEALTH CARE EDUCATION/TRAINING PROGRAM

## 2022-08-12 PROCEDURE — 31623 PR BRONCHOSCOPY,DIAGNOSTIC W BRUSH: ICD-10-PCS | Mod: 51,RT,, | Performed by: INTERNAL MEDICINE

## 2022-08-12 PROCEDURE — 31629 BRONCHOSCOPY/NEEDLE BX EACH: CPT | Mod: 59,RT,, | Performed by: INTERNAL MEDICINE

## 2022-08-12 PROCEDURE — 31653 PR BRONCH W/ EBUS, SAMPLING 3 OR MORE NODES, INCL GUIDE: ICD-10-PCS | Mod: ,,, | Performed by: INTERNAL MEDICINE

## 2022-08-12 PROCEDURE — 27201423 OPTIME MED/SURG SUP & DEVICES STERILE SUPPLY: Performed by: INTERNAL MEDICINE

## 2022-08-12 PROCEDURE — 36000709 HC OR TIME LEV III EA ADD 15 MIN: Performed by: INTERNAL MEDICINE

## 2022-08-12 PROCEDURE — 88305 TISSUE EXAM BY PATHOLOGIST: ICD-10-PCS | Mod: 26,,, | Performed by: PATHOLOGY

## 2022-08-12 PROCEDURE — 88305 TISSUE EXAM BY PATHOLOGIST: CPT | Mod: 59 | Performed by: PATHOLOGY

## 2022-08-12 PROCEDURE — 71000016 HC POSTOP RECOV ADDL HR: Performed by: INTERNAL MEDICINE

## 2022-08-12 PROCEDURE — 31628 BRONCHOSCOPY/LUNG BX EACH: CPT | Mod: 51,RT,, | Performed by: INTERNAL MEDICINE

## 2022-08-12 PROCEDURE — D9220A PRA ANESTHESIA: ICD-10-PCS | Mod: ANES,,, | Performed by: ANESTHESIOLOGY

## 2022-08-12 PROCEDURE — 82962 GLUCOSE BLOOD TEST: CPT | Performed by: INTERNAL MEDICINE

## 2022-08-12 PROCEDURE — 31627 PR BRONCHOSCOPY,COMPUTER ASSIST/IMAGE-GUIDED NAVIGATION: ICD-10-PCS | Mod: ,,, | Performed by: INTERNAL MEDICINE

## 2022-08-12 PROCEDURE — 88360 TUMOR IMMUNOHISTOCHEM/MANUAL: CPT | Performed by: PATHOLOGY

## 2022-08-12 PROCEDURE — 71000044 HC DOSC ROUTINE RECOVERY FIRST HOUR: Performed by: INTERNAL MEDICINE

## 2022-08-12 PROCEDURE — 31654 BRONCH EBUS IVNTJ PERPH LES: CPT | Mod: ,,, | Performed by: INTERNAL MEDICINE

## 2022-08-12 PROCEDURE — 31628 PR BRONCHOSCOPY,TRANSBRONCH BIOPSY: ICD-10-PCS | Mod: 51,RT,, | Performed by: INTERNAL MEDICINE

## 2022-08-12 PROCEDURE — 31632 PR BRONCHOSCOPY/LUNG BX, ADD'L: ICD-10-PCS | Mod: ,,, | Performed by: INTERNAL MEDICINE

## 2022-08-12 PROCEDURE — 82962 GLUCOSE BLOOD TEST: CPT | Mod: 91 | Performed by: INTERNAL MEDICINE

## 2022-08-12 PROCEDURE — 37000008 HC ANESTHESIA 1ST 15 MINUTES: Performed by: INTERNAL MEDICINE

## 2022-08-12 RX ORDER — PROPOFOL 10 MG/ML
VIAL (ML) INTRAVENOUS
Status: DISCONTINUED | OUTPATIENT
Start: 2022-08-12 | End: 2022-08-12

## 2022-08-12 RX ORDER — PROPOFOL 10 MG/ML
VIAL (ML) INTRAVENOUS CONTINUOUS PRN
Status: DISCONTINUED | OUTPATIENT
Start: 2022-08-12 | End: 2022-08-12

## 2022-08-12 RX ORDER — VASOPRESSIN 20 [USP'U]/ML
INJECTION, SOLUTION INTRAMUSCULAR; SUBCUTANEOUS
Status: DISCONTINUED | OUTPATIENT
Start: 2022-08-12 | End: 2022-08-12

## 2022-08-12 RX ORDER — MIDAZOLAM HYDROCHLORIDE 1 MG/ML
INJECTION, SOLUTION INTRAMUSCULAR; INTRAVENOUS
Status: DISCONTINUED | OUTPATIENT
Start: 2022-08-12 | End: 2022-08-12

## 2022-08-12 RX ORDER — EPHEDRINE SULFATE 50 MG/ML
INJECTION, SOLUTION INTRAVENOUS
Status: DISCONTINUED | OUTPATIENT
Start: 2022-08-12 | End: 2022-08-12

## 2022-08-12 RX ORDER — FENTANYL CITRATE 50 UG/ML
INJECTION, SOLUTION INTRAMUSCULAR; INTRAVENOUS
Status: DISCONTINUED | OUTPATIENT
Start: 2022-08-12 | End: 2022-08-12

## 2022-08-12 RX ORDER — PHENYLEPHRINE HYDROCHLORIDE 10 MG/ML
INJECTION INTRAVENOUS
Status: DISCONTINUED | OUTPATIENT
Start: 2022-08-12 | End: 2022-08-12

## 2022-08-12 RX ORDER — DEXAMETHASONE SODIUM PHOSPHATE 4 MG/ML
INJECTION, SOLUTION INTRA-ARTICULAR; INTRALESIONAL; INTRAMUSCULAR; INTRAVENOUS; SOFT TISSUE
Status: DISCONTINUED | OUTPATIENT
Start: 2022-08-12 | End: 2022-08-12

## 2022-08-12 RX ORDER — FENTANYL CITRATE 50 UG/ML
25 INJECTION, SOLUTION INTRAMUSCULAR; INTRAVENOUS EVERY 5 MIN PRN
Status: DISCONTINUED | OUTPATIENT
Start: 2022-08-12 | End: 2022-08-12 | Stop reason: HOSPADM

## 2022-08-12 RX ORDER — ROCURONIUM BROMIDE 10 MG/ML
INJECTION, SOLUTION INTRAVENOUS
Status: DISCONTINUED | OUTPATIENT
Start: 2022-08-12 | End: 2022-08-12

## 2022-08-12 RX ORDER — SODIUM CHLORIDE 0.9 % (FLUSH) 0.9 %
3 SYRINGE (ML) INJECTION
Status: DISCONTINUED | OUTPATIENT
Start: 2022-08-12 | End: 2022-08-12 | Stop reason: HOSPADM

## 2022-08-12 RX ORDER — LIDOCAINE HYDROCHLORIDE 20 MG/ML
INJECTION, SOLUTION EPIDURAL; INFILTRATION; INTRACAUDAL; PERINEURAL
Status: DISCONTINUED | OUTPATIENT
Start: 2022-08-12 | End: 2022-08-12

## 2022-08-12 RX ADMIN — VASOPRESSIN 0.5 UNITS: 20 INJECTION, SOLUTION INTRAMUSCULAR; SUBCUTANEOUS at 10:08

## 2022-08-12 RX ADMIN — PROPOFOL 100 MG: 10 INJECTION, EMULSION INTRAVENOUS at 08:08

## 2022-08-12 RX ADMIN — EPHEDRINE SULFATE 10 MG: 50 INJECTION INTRAVENOUS at 09:08

## 2022-08-12 RX ADMIN — ROCURONIUM BROMIDE 10 MG: 10 INJECTION, SOLUTION INTRAVENOUS at 10:08

## 2022-08-12 RX ADMIN — FENTANYL CITRATE 50 MCG: 50 INJECTION, SOLUTION INTRAMUSCULAR; INTRAVENOUS at 08:08

## 2022-08-12 RX ADMIN — MIDAZOLAM 1 MG: 1 INJECTION INTRAMUSCULAR; INTRAVENOUS at 08:08

## 2022-08-12 RX ADMIN — PHENYLEPHRINE HYDROCHLORIDE 200 MCG: 10 INJECTION INTRAVENOUS at 09:08

## 2022-08-12 RX ADMIN — GLYCOPYRROLATE 0.2 MG: 0.2 INJECTION, SOLUTION INTRAMUSCULAR; INTRAVITREAL at 09:08

## 2022-08-12 RX ADMIN — SODIUM CHLORIDE, SODIUM GLUCONATE, SODIUM ACETATE, POTASSIUM CHLORIDE, MAGNESIUM CHLORIDE, SODIUM PHOSPHATE, DIBASIC, AND POTASSIUM PHOSPHATE: .53; .5; .37; .037; .03; .012; .00082 INJECTION, SOLUTION INTRAVENOUS at 08:08

## 2022-08-12 RX ADMIN — ROCURONIUM BROMIDE 20 MG: 10 INJECTION, SOLUTION INTRAVENOUS at 09:08

## 2022-08-12 RX ADMIN — VASOPRESSIN 0.5 UNITS: 20 INJECTION, SOLUTION INTRAMUSCULAR; SUBCUTANEOUS at 09:08

## 2022-08-12 RX ADMIN — Medication 125 MCG/KG/MIN: at 08:08

## 2022-08-12 RX ADMIN — VASOPRESSIN 1 UNITS: 20 INJECTION, SOLUTION INTRAMUSCULAR; SUBCUTANEOUS at 09:08

## 2022-08-12 RX ADMIN — PHENYLEPHRINE HYDROCHLORIDE 300 MCG: 10 INJECTION INTRAVENOUS at 09:08

## 2022-08-12 RX ADMIN — ROCURONIUM BROMIDE 10 MG: 10 INJECTION, SOLUTION INTRAVENOUS at 09:08

## 2022-08-12 RX ADMIN — ROCURONIUM BROMIDE 40 MG: 10 INJECTION, SOLUTION INTRAVENOUS at 08:08

## 2022-08-12 RX ADMIN — EPHEDRINE SULFATE 20 MG: 50 INJECTION INTRAVENOUS at 09:08

## 2022-08-12 RX ADMIN — LIDOCAINE HYDROCHLORIDE 100 MG: 20 INJECTION, SOLUTION EPIDURAL; INFILTRATION; INTRACAUDAL; PERINEURAL at 08:08

## 2022-08-12 RX ADMIN — FENTANYL CITRATE 25 MCG: 50 INJECTION, SOLUTION INTRAMUSCULAR; INTRAVENOUS at 11:08

## 2022-08-12 RX ADMIN — DEXAMETHASONE SODIUM PHOSPHATE 4 MG: 4 INJECTION, SOLUTION INTRAMUSCULAR; INTRAVENOUS at 08:08

## 2022-08-12 RX ADMIN — SUGAMMADEX 200 MG: 100 INJECTION, SOLUTION INTRAVENOUS at 11:08

## 2022-08-12 NOTE — ANESTHESIA POSTPROCEDURE EVALUATION
Anesthesia Post Evaluation    Patient: Lan Robles    Procedure(s) Performed: Procedure(s) (LRB):  ROBOTIC BRONCHOSCOPY (N/A)    Final Anesthesia Type: general      Patient location during evaluation: PACU  Patient participation: Yes- Able to Participate  Level of consciousness: awake and alert  Post-procedure vital signs: reviewed and stable  Pain management: adequate  Airway patency: patent    PONV status at discharge: No PONV  Anesthetic complications: no      Cardiovascular status: blood pressure returned to baseline  Respiratory status: unassisted  Hydration status: euvolemic  Follow-up not needed.          Vitals Value Taken Time   /87 08/12/22 1316   Temp  08/12/22 1324   Pulse 78 08/12/22 1322   Resp 20 08/12/22 1245   SpO2 95 % 08/12/22 1322   Vitals shown include unvalidated device data.      No case tracking events are documented in the log.      Pain/Delroy Score: Delroy Score: 8 (8/12/2022 11:47 AM)

## 2022-08-12 NOTE — PROGRESS NOTES
Dr. Sims notified patient's .  States ok for patient to discharge when criteria met, continue home medication. No new orders at this time.

## 2022-08-12 NOTE — PLAN OF CARE
Discharge instructions reviewed with patient and daughter. Verbalizes understanding. Copies of instructions given to patient. Tolerating PO fluids. Denies pain or nausea. Safety maintained. Weaning patient off O2 to prepare for discharge.

## 2022-08-12 NOTE — H&P
[facility] - Surgery ([facility])  Pulmonology  H&P    Patient Name: Lan Robles  MRN: [id number]  Admission Date: 2022  Code Status: No Order  Primary Care Provider: Primary Doctor No   Principal Problem: Pulmonary nodule    Subjective:     HPI:  67 year old former smoker with a history of prostate cancer on hormone suppressive therapy.  Pulmonary nodules are perstient and spiculated.  Doing well with anoro daily and albuterol for emphysema.  Only gets dyspneic on extreme exertion.       Past Medical History:   Diagnosis Date    COPD (chronic obstructive pulmonary disease)     Diabetes mellitus     Prostate cancer        History reviewed. No pertinent surgical history.    Review of patient's allergies indicates:  No Known Allergies    Family History       Problem Relation (Age of Onset)    Cancer Father    Diabetes Mother          Tobacco Use    Smoking status: Former Smoker     Quit date: 2022     Years since quittin.3    Smokeless tobacco: Never Used   Substance and Sexual Activity    Alcohol use: Yes    Drug use: Never    Sexual activity: Yes         Review of Systems   Constitutional:  Negative for fever and unexpected weight change.   HENT:  Positive for postnasal drip and rhinorrhea.    Respiratory:  Positive for cough. Negative for chest tightness, shortness of breath and wheezing.    Skin:  Negative for color change.   Hematological:  Negative for adenopathy.   Psychiatric/Behavioral:  Negative for agitation.    Objective:     Vital Signs (Most Recent):  Temp: 97.9 °F (36.6 °C) (22 075)  Pulse: 60 (22 075)  Resp: 20 (22 075)  BP: (!) 154/86 (22 075)  SpO2: 95 % (22)   Vital Signs (24h Range):  Temp:  [97.9 °F (36.6 °C)] 97.9 °F (36.6 °C)  Pulse:  [60] 60  Resp:  [20] 20  SpO2:  [95 %] 95 %  BP: (154)/(86) 154/86     Weight: 72.1 kg (159 lb)  Body mass index is 22.81 kg/m².    No intake or output data in the 24 hours ending 22 08    Physical  Exam  Constitutional:       Appearance: Normal appearance.   HENT:      Head: Normocephalic.   Cardiovascular:      Rate and Rhythm: Normal rate and regular rhythm.   Pulmonary:      Effort: Pulmonary effort is normal. No respiratory distress.      Breath sounds: Normal breath sounds.   Abdominal:      Palpations: There is no mass.   Musculoskeletal:         General: No swelling. Normal range of motion.      Cervical back: Normal range of motion.   Neurological:      General: No focal deficit present.      Mental Status: He is alert and oriented to person, place, and time.   Psychiatric:         Mood and Affect: Mood normal.         Behavior: Behavior normal.     Vents:       Lines/Drains/Airways       Peripheral Intravenous Line  Duration                  Peripheral IV - Single Lumen 08/12/22 0809 18 G Anterior;Proximal;Right Forearm <1 day                    Significant Labs:    CBC/Anemia Profile:  No results for input(s): WBC, HGB, HCT, PLT, MCV, RDW, IRON, FERRITIN, RETIC, FOLATE, [id number], OCCULTBLOOD in the last 48 hours.     Chemistries:  No results for input(s): NA, K, CL, CO2, BUN, CREATININE, CALCIUM, ALBUMIN, PROT, BILITOT, ALKPHOS, ALT, AST, GLUCOSE, MG, PHOS in the last 48 hours.    All pertinent labs within the past 24 hours have been reviewed.    Significant Imaging:   I have reviewed all pertinent imaging results/findings within the past 24 hours.  CT: I have reviewed all pertinent results/findings within the past 24 hours and my personal findings are:  Persistent nodules were segmented out for robotic bronchoscopy on the Right.  Reviewed with patient and daughter    Assessment/Plan:     * Pulmonary nodule  Multiple, the largest was less than 2 cm.  Persistent after emperic tx for infection.  Diagnostic robotic bronchoscopy with staging EBUS    I have explained the risks, benefits and alternatives of the procedure in detail.  The patient voices understanding and all questions have been answered.   The patient agrees to proceed as planned.        Centrilobular emphysema   PFTs with obstruction.   controller with anoro daily.  Albuterol for rescue and prevention    Smoker  Quit 4 months ago  Congratulated on cessation             Laney Bro MD  Pulmonology  [facility] - Surgery (2nd Fl)

## 2022-08-12 NOTE — ANESTHESIA PREPROCEDURE EVALUATION
08/12/2022  Lan Robles is a 67 y.o., male.      Pre-op Assessment    I have reviewed the Patient Summary Reports.       I have reviewed the Medications.     Review of Systems  Anesthesia Hx:  No problems with previous Anesthesia  History of prior surgery of interest to airway management or planning:   Social:  Former Smoker, Social Alcohol Use    Hematology/Oncology:  Hematology Normal   Oncology Normal     EENT/Dental:EENT/Dental Normal   Cardiovascular:  Cardiovascular Normal Exercise tolerance: good     Pulmonary:   COPD, mild Shortness of breath    Renal/:  Renal/ Normal     Hepatic/GI:  Hepatic/GI Normal    Musculoskeletal:  Musculoskeletal Normal    Neurological:  Neurology Normal    Endocrine:   Diabetes, well controlled, type 2    Dermatological:  Skin Normal    Psych:  Psychiatric Normal           Physical Exam  General: Well nourished, Cooperative, Alert and Oriented    Airway:  Mallampati: II   Mouth Opening: Normal  TM Distance: Normal  Tongue: Normal  Neck ROM: Normal ROM    Dental:  Intact        Anesthesia Plan  Type of Anesthesia, risks & benefits discussed:    Anesthesia Type: Gen Supraglottic Airway, Gen ETT  Intra-op Monitoring Plan: Standard ASA Monitors  Post Op Pain Control Plan: multimodal analgesia and IV/PO Opioids PRN  Induction:  IV  Airway Plan: Direct and Video, Post-Induction  Informed Consent: Informed consent signed with the Patient and all parties understand the risks and agree with anesthesia plan.  All questions answered.   ASA Score: 3    Ready For Surgery From Anesthesia Perspective.     .

## 2022-08-12 NOTE — SUBJECTIVE & OBJECTIVE
Past Medical History:   Diagnosis Date    COPD (chronic obstructive pulmonary disease)     Diabetes mellitus     Prostate cancer        History reviewed. No pertinent surgical history.    Review of patient's allergies indicates:  No Known Allergies    Family History       Problem Relation (Age of Onset)    Cancer Father    Diabetes Mother          Tobacco Use    Smoking status: Former Smoker     Quit date: 2022     Years since quittin.3    Smokeless tobacco: Never Used   Substance and Sexual Activity    Alcohol use: Yes    Drug use: Never    Sexual activity: Yes         Review of Systems   Constitutional:  Negative for fever and unexpected weight change.   HENT:  Positive for postnasal drip and rhinorrhea.    Respiratory:  Positive for cough. Negative for chest tightness, shortness of breath and wheezing.    Skin:  Negative for color change.   Hematological:  Negative for adenopathy.   Psychiatric/Behavioral:  Negative for agitation.    Objective:     Vital Signs (Most Recent):  Temp: 97.9 °F (36.6 °C) (22)  Pulse: 60 (22)  Resp: 20 (22)  BP: (!) 154/86 (22)  SpO2: 95 % (22)   Vital Signs (24h Range):  Temp:  [97.9 °F (36.6 °C)] 97.9 °F (36.6 °C)  Pulse:  [60] 60  Resp:  [20] 20  SpO2:  [95 %] 95 %  BP: (154)/(86) 154/86     Weight: 72.1 kg (159 lb)  Body mass index is 22.81 kg/m².    No intake or output data in the 24 hours ending 22    Physical Exam  Constitutional:       Appearance: Normal appearance.   HENT:      Head: Normocephalic.   Cardiovascular:      Rate and Rhythm: Normal rate and regular rhythm.   Pulmonary:      Effort: Pulmonary effort is normal. No respiratory distress.      Breath sounds: Normal breath sounds.   Abdominal:      Palpations: There is no mass.   Musculoskeletal:         General: No swelling. Normal range of motion.      Cervical back: Normal range of motion.   Neurological:      General: No focal deficit present.       Mental Status: He is alert and oriented to person, place, and time.   Psychiatric:         Mood and Affect: Mood normal.         Behavior: Behavior normal.     Vents:       Lines/Drains/Airways       Peripheral Intravenous Line  Duration                  Peripheral IV - Single Lumen 08/12/22 0809 18 G Anterior;Proximal;Right Forearm <1 day                    Significant Labs:    CBC/Anemia Profile:  No results for input(s): WBC, HGB, HCT, PLT, MCV, RDW, IRON, FERRITIN, RETIC, FOLATE, [id number], OCCULTBLOOD in the last 48 hours.     Chemistries:  No results for input(s): NA, K, CL, CO2, BUN, CREATININE, CALCIUM, ALBUMIN, PROT, BILITOT, ALKPHOS, ALT, AST, GLUCOSE, MG, PHOS in the last 48 hours.    All pertinent labs within the past 24 hours have been reviewed.    Significant Imaging:   I have reviewed all pertinent imaging results/findings within the past 24 hours.  CT: I have reviewed all pertinent results/findings within the past 24 hours and my personal findings are:  Persistent nodules were segmented out for robotic bronchoscopy on the Right.  Reviewed with patient and daughter

## 2022-08-12 NOTE — HPI
67 year old former smoker with a history of prostate cancer on hormone suppressive therapy.  Pulmonary nodules are perstient and spiculated.  Doing well with anoro daily and albuterol for emphysema.  Only gets dyspneic on extreme exertion.

## 2022-08-12 NOTE — TRANSFER OF CARE
"Anesthesia Transfer of Care Note    Patient: Lan Robles    Procedure(s) Performed: Procedure(s) (LRB):  ROBOTIC BRONCHOSCOPY (N/A)    Patient location: PACU    Anesthesia Type: general    Transport from OR: Transported from OR on 6-10 L/min O2 by face mask with adequate spontaneous ventilation    Post pain: adequate analgesia    Post assessment: no apparent anesthetic complications and tolerated procedure well    Post vital signs: stable    Level of consciousness: awake    Nausea/Vomiting: no nausea/vomiting    Complications: none    Transfer of care protocol was followed      Last vitals:   Visit Vitals  BP (!) 154/86 (BP Location: Right arm, Patient Position: Lying)   Pulse 60   Temp 36.6 °C (97.9 °F) (Oral)   Resp 20   Ht 5' 10" (1.778 m)   Wt 72.1 kg (159 lb)   SpO2 95%   BMI 22.81 kg/m²     "

## 2022-08-12 NOTE — PLAN OF CARE
Patient is currently sat 87-88% on room air. IS preformed sat improving t0 93% on room air.    1700-Dr Bro notified via secure chat. Per Dr Bro patient is ok to be discharged to home.

## 2022-08-12 NOTE — PROCEDURE NOTE ADDENDUM
Ion robotic bronchoscopy uses space sensing technology not electromagnetic.  Per bronchoscopy note.

## 2022-08-12 NOTE — DISCHARGE SUMMARY
[facility] - Surgery ([facility])  Pulmonology  Discharge Summary      Patient Name: Lan Robles  MRN: [id number]  Admission Date: 8/12/2022  Hospital Length of Stay: 0 days  Discharge Date and Time:  08/12/2022 1:23 PM  Attending Physician: Laney Bro MD   Discharging Provider: Laney Bro MD  Primary Care Provider: Primary Doctor No    HPI: Patient is a current/former smoker with persistent pulmonary nodules and emphysema.  Consent for robotic bronchoscopy.      Procedure(s) (LRB):  ROBOTIC BRONCHOSCOPY (N/A)    Indwelling Lines/Drains at Time of Discharge:   Lines/Drains/Airways     None                 Hospital Course: Bronchoscopy complete with ion robot to posterior segment of RUL and superior segment of RLL        Significant Labs:  None    Significant Imaging:  I have reviewed all pertinent imaging results/findings within the past 24 hours.  CT: I have reviewed all pertinent results/findings within the past 24 hours and my personal findings are:  persistent nodules segmented out to target for robot    Pending Diagnostic Studies:     Procedure Component Value Units Date/Time    Cytology- FNA Radiology Guided, Bronch/EBUS, EUS/GI [[id number]] Collected: 08/12/22 1109    Order Status: Sent Lab Status: In process Updated: 08/12/22 1233        Final Active Diagnoses:    Diagnosis Date Noted POA    PRINCIPAL PROBLEM:  Pulmonary nodule [R91.1]  Yes    Centrilobular emphysema [J43.2] 05/17/2022 Yes    Smoker [F17.200]  Yes      Problems Resolved During this Admission:       Discharged Condition: stable    Disposition: Home or Self Care    Follow Up:   Follow-up Information     Laney Bro MD. Call in 1 week(s).    Specialties: Intensive Care, Pulmonary Disease  Contact information:  [address]  [facility] [address]  [phone]                       Patient Instructions:      Diet general     Medications:  Reconciled Home Medications:      Medication List      CONTINUE taking these medications   "  albuterol 90 mcg/actuation inhaler  Commonly known as: PROVENTIL/VENTOLIN HFA  Inhale 2 puffs into the lungs every 6 (six) hours as needed.     ANORO ELLIPTA 62.5-25 mcg/actuation Dsdv  Generic drug: umeclidinium-vilanteroL  Inhale 1 puff into the lungs once daily. Controller     BD BRENNA 2ND GEN PEN NEEDLE 32 gauge x 5/32" Ndle  Generic drug: pen needle, diabetic  USE TO INJECT INSULIN UNDER THE SKIN EVERY DAY     bicalutamide 50 MG Tab  Commonly known as: CASODEX  Take 1 tablet (50 mg total) by mouth once daily.     cholecalciferol (vitamin D3) 1,250 mcg (50,000 unit) capsule  TAKE 1 CAPSULE BY MOUTH 1 TIME EVERY WEEK     ketorolac 0.5% 0.5 % Drop  Commonly known as: ACULAR  INSTILL 1 DROP IN LEFT EYE FOUR TIMES DAILY. START 3 DAYS BEFORE SURGERY     levocetirizine 5 MG tablet  Commonly known as: XYZAL  Take 1 tablet (5 mg total) by mouth every evening.     metFORMIN 1000 MG tablet  Commonly known as: GLUCOPHAGE     prednisoLONE acetate 1 % Drps  Commonly known as: PRED FORTE  SHAKE LIQUID AND INSTILL 1 DROP IN LEFT EYE FOUR TIMES DAILY AFTER SURGERY     simvastatin 40 MG tablet  Commonly known as: ZOCOR  Take 1 tablet by mouth.     tamsulosin 0.4 mg Cap  Commonly known as: FLOMAX  Take 1 capsule by mouth.     TRESIBA FLEXTOUCH U-100 100 unit/mL (3 mL) insulin pen  Generic drug: insulin degludec  INJECT 20 UNITS UNDER THE SKIN ONCE DAILY            Laney Bro MD  Pulmonology  [facility] - Surgery (2nd Fl)  "

## 2022-08-12 NOTE — ANESTHESIA PROCEDURE NOTES
Intubation    Date/Time: 8/12/2022 8:56 AM  Performed by: Kilo Ndiaye CRNA  Authorized by: Kilo Ndiaye CRNA     Intubation:     Induction:  Intravenous    Intubated:  Postinduction    Mask Ventilation:  Easy mask    Attempts:  1    Attempted By:  CRNA    Method of Intubation:  Direct    Blade:  Martinez 2    Laryngeal View Grade: Grade I - full view of cords      Difficult Airway Encountered?: No      Complications:  None    Airway Device:  Oral endotracheal tube    Airway Device Size:  9.0    Style/Cuff Inflation:  Cuffed    Tube secured:  22    Secured at:  The lips    Placement Verified By:  Capnometry, Revisualization with laryngoscopy and other (see comments) (BBS)    Complicating Factors:  Large/floppy epiglottis    Findings Post-Intubation:  BS equal bilateral

## 2022-08-16 ENCOUNTER — OFFICE VISIT (OUTPATIENT)
Dept: UROLOGY | Facility: CLINIC | Age: 67
End: 2022-08-16
Payer: MEDICARE

## 2022-08-16 VITALS
HEART RATE: 79 BPM | DIASTOLIC BLOOD PRESSURE: 79 MMHG | HEIGHT: 70 IN | WEIGHT: 158.94 LBS | SYSTOLIC BLOOD PRESSURE: 119 MMHG | BODY MASS INDEX: 22.75 KG/M2

## 2022-08-16 DIAGNOSIS — C61 PROSTATE CANCER: Primary | ICD-10-CM

## 2022-08-16 DIAGNOSIS — N28.89 RENAL MASS: ICD-10-CM

## 2022-08-16 PROCEDURE — 1101F PT FALLS ASSESS-DOCD LE1/YR: CPT | Mod: CPTII,S$GLB,, | Performed by: NURSE PRACTITIONER

## 2022-08-16 PROCEDURE — 96402 PR CHEMOTHER HORMON ANTINEOPL SUB-Q/IM: ICD-10-PCS | Mod: S$GLB,,, | Performed by: NURSE PRACTITIONER

## 2022-08-16 PROCEDURE — 1126F PR PAIN SEVERITY QUANTIFIED, NO PAIN PRESENT: ICD-10-PCS | Mod: CPTII,S$GLB,, | Performed by: NURSE PRACTITIONER

## 2022-08-16 PROCEDURE — 3008F BODY MASS INDEX DOCD: CPT | Mod: CPTII,S$GLB,, | Performed by: NURSE PRACTITIONER

## 2022-08-16 PROCEDURE — 1160F PR REVIEW ALL MEDS BY PRESCRIBER/CLIN PHARMACIST DOCUMENTED: ICD-10-PCS | Mod: CPTII,S$GLB,, | Performed by: NURSE PRACTITIONER

## 2022-08-16 PROCEDURE — 3074F SYST BP LT 130 MM HG: CPT | Mod: CPTII,S$GLB,, | Performed by: NURSE PRACTITIONER

## 2022-08-16 PROCEDURE — 3074F PR MOST RECENT SYSTOLIC BLOOD PRESSURE < 130 MM HG: ICD-10-PCS | Mod: CPTII,S$GLB,, | Performed by: NURSE PRACTITIONER

## 2022-08-16 PROCEDURE — 3078F DIAST BP <80 MM HG: CPT | Mod: CPTII,S$GLB,, | Performed by: NURSE PRACTITIONER

## 2022-08-16 PROCEDURE — 3288F PR FALLS RISK ASSESSMENT DOCUMENTED: ICD-10-PCS | Mod: CPTII,S$GLB,, | Performed by: NURSE PRACTITIONER

## 2022-08-16 PROCEDURE — 1159F MED LIST DOCD IN RCRD: CPT | Mod: CPTII,S$GLB,, | Performed by: NURSE PRACTITIONER

## 2022-08-16 PROCEDURE — 1101F PR PT FALLS ASSESS DOC 0-1 FALLS W/OUT INJ PAST YR: ICD-10-PCS | Mod: CPTII,S$GLB,, | Performed by: NURSE PRACTITIONER

## 2022-08-16 PROCEDURE — 99499 UNLISTED E&M SERVICE: CPT | Mod: S$GLB,,, | Performed by: NURSE PRACTITIONER

## 2022-08-16 PROCEDURE — 1126F AMNT PAIN NOTED NONE PRSNT: CPT | Mod: CPTII,S$GLB,, | Performed by: NURSE PRACTITIONER

## 2022-08-16 PROCEDURE — 1160F RVW MEDS BY RX/DR IN RCRD: CPT | Mod: CPTII,S$GLB,, | Performed by: NURSE PRACTITIONER

## 2022-08-16 PROCEDURE — 3078F PR MOST RECENT DIASTOLIC BLOOD PRESSURE < 80 MM HG: ICD-10-PCS | Mod: CPTII,S$GLB,, | Performed by: NURSE PRACTITIONER

## 2022-08-16 PROCEDURE — 3008F PR BODY MASS INDEX (BMI) DOCUMENTED: ICD-10-PCS | Mod: CPTII,S$GLB,, | Performed by: NURSE PRACTITIONER

## 2022-08-16 PROCEDURE — 3288F FALL RISK ASSESSMENT DOCD: CPT | Mod: CPTII,S$GLB,, | Performed by: NURSE PRACTITIONER

## 2022-08-16 PROCEDURE — 99499 NO LOS: ICD-10-PCS | Mod: S$GLB,,, | Performed by: NURSE PRACTITIONER

## 2022-08-16 PROCEDURE — 1159F PR MEDICATION LIST DOCUMENTED IN MEDICAL RECORD: ICD-10-PCS | Mod: CPTII,S$GLB,, | Performed by: NURSE PRACTITIONER

## 2022-08-16 PROCEDURE — 96402 CHEMO HORMON ANTINEOPL SQ/IM: CPT | Mod: S$GLB,,, | Performed by: NURSE PRACTITIONER

## 2022-08-16 RX ORDER — FLUTICASONE PROPIONATE 50 MCG
SPRAY, SUSPENSION (ML) NASAL
COMMUNITY
Start: 2022-06-15

## 2022-08-16 RX ORDER — ALBUTEROL SULFATE 90 UG/1
AEROSOL, METERED RESPIRATORY (INHALATION)
COMMUNITY

## 2022-08-16 NOTE — Clinical Note
Please schedule follow up with Dr. Avalos in 6 months with PSA, testosterone, BMP, and CT abdomen/pelvis with and without contrast. Thanks!

## 2022-08-16 NOTE — PROGRESS NOTES
"Subjective:      Lan Robles is a 67 y.o. male who returns today regarding his prostate cancer.    The patient was diagnosed with rosy 9 prostate cancer ( at the time of biopsy) on 3/12/20. After discussing treatment options with Dr. Avalos, he opted to proceed with ADT. He received Firmagon 240 mg on 4/1/20 and eligard on 4/29/20. Bone scan on 3/24 showed no evidence of metastatic disease. CT abdomen/pelvis showed no evidence of metastatic disease. There was an incidental 1.8 cm solid upper pole right renal lesion concerning for RCC. Repeat CT scan in April showed "Stable appearance of the enhancing solid mass within the upper pole of the right kidney measuring 1.7 x 1.3 x 2.0 cm. Punctate nonobstructing renal calculi noted bilaterally.."     He completed therapy 70 Gy in 28 fractions to the prostate on 11/10/20.      Last eligard injection was 11/5/21. He missed appointments with Dr. Avalos on 5/6 and 7/1.    Returns today for his final dose of eligard 45 mg. Denies bothersome urinary symptoms. Remains on flomax.     Component      Latest Ref Rng & Units 4/29/2022   PSA Diagnostic      0.00 - 4.00 ng/mL <0.01   Testosterone, Total      304 - 1227 ng/dL 22 (L)     The following portions of the patient's history were reviewed and updated as appropriate: allergies, current medications, past family history, past medical history, past social history, past surgical history and problem list.    Review of Systems  Constitutional: no fever or chills  ENT: no nasal congestion or sore throat  Respiratory: no cough or shortness of breath  Cardiovascular: no chest pain or palpitations  Gastrointestinal: no nausea or vomiting, tolerating diet  Genitourinary: as per HPI  Hematologic/Lymphatic: no easy bruising or lymphadenopathy  Musculoskeletal: no arthralgias or myalgias  Neurological: no seizures or tremors  Behavioral/Psych: no auditory or visual hallucinations     Objective:   Vitals: /79 (BP " "Location: Left arm, Patient Position: Sitting, BP Method: Large (Automatic))   Pulse 79   Ht 5' 10" (1.778 m)   Wt 72.1 kg (158 lb 15.2 oz)   BMI 22.81 kg/m²     Physical Exam   General: alert and oriented, no acute distress  Head: normocephalic, atraumatic  Neck: supple, normal ROM  Respiratory: Symmetric expansion, non-labored breathing  Cardiovascular: regular rate and rhythm  Abdomen: soft, non tender, non distended  Genitourinary: deferred  Skin: normal coloration and turgor, no rashes, no suspicious skin lesions noted  Neuro: alert and oriented x3, no gross deficits  Psych: normal judgment and insight, normal mood/affect and non-anxious    Lab Review   Urinalysis demonstrates negative for all components  Lab Results   Component Value Date    WBC 5.57 01/28/2022    HGB 13.0 (L) 01/28/2022    HCT 40.9 01/28/2022    MCV 86 01/28/2022     01/28/2022     Lab Results   Component Value Date    CREATININE 1.0 04/29/2022    BUN 12 04/29/2022     No results found for: PSA  Imaging   None    Assessment:     1. Prostate cancer    2.      Renal mass     Plan:   Lan was seen today for follow-up.    Diagnoses and all orders for this visit:    Prostate cancer Sina 9 [id number]; psa 123  -     Testosterone; Future  -     Prostate Specific Antigen, Diagnostic; Future    Renal mass- on active surveillance  -     CT Abdomen Pelvis W Wo Contrast; Future  -     Basic Metabolic Panel; Future    Plan:  --Final dose of eligard 45 mg today  --Follow up with Dr. Avalos in 6 months with PSA/testosterone, BMP and CT renal mass protocol       "

## 2022-08-20 ENCOUNTER — PATIENT MESSAGE (OUTPATIENT)
Dept: PULMONOLOGY | Facility: CLINIC | Age: 67
End: 2022-08-20
Payer: MEDICARE

## 2022-08-22 ENCOUNTER — PATIENT MESSAGE (OUTPATIENT)
Dept: PULMONOLOGY | Facility: CLINIC | Age: 67
End: 2022-08-22
Payer: MEDICARE

## 2022-08-22 DIAGNOSIS — C34.91 ADENOCARCINOMA, LUNG, RIGHT: Primary | ICD-10-CM

## 2022-08-22 NOTE — TELEPHONE ENCOUNTER
Spoke to daughter and patient regarding results.  Right lower lobe biopsy is positive for adenocarcinoma.  RUL bx is inflammatory.  Additional nodule on the left was not sampled.  ebus was negative for mediastinal involvement.  Patient is likely not a surgical candidate but will obtain a PET and request thoracic oncology MDC to assess thereafter.

## 2022-08-23 ENCOUNTER — TELEPHONE (OUTPATIENT)
Dept: CARDIOTHORACIC SURGERY | Facility: CLINIC | Age: 67
End: 2022-08-23
Payer: MEDICARE

## 2022-08-23 NOTE — NURSING
Spoke with patient's daughter Cait regarding the referral to Thoracic Surgery and Radiation Oncology.  Notified of the scheduled PET CT for 09/02/22 at the [place] location.  NPO instructions given.  Also discussed that Blood sugar would be tested.  Patient's daughter was driving at the time of our phone call.  They are active on the patient portal.  Verified mailing address.  Reminder mailed.  Also included my contact information with the appointment slips.  Oncology Navigation   Intake  Date of Diagnosis: 8/22/2022  Cancer Type: Thoracic  Internal / External Referral: Internal  Referral Source: Dr. Bro  Date of Referral: 8/22/2022  Initial Nurse Navigator Contact: 8/23/2022  Referral to Initial Contact Timeline (days): 1  Date Worked: 8/23/2022  First Appointment Available: 9/7/2022  Appointment Date: 9/7/2022  First Available Date vs. Scheduled Date (days): 0  Multiple appointments: Yes  Reason if booked > 7 days after scheduling: Additional tests/procedures     Treatment  Current Status: Staging work-up                             Acuity      Follow Up  No follow-ups on file.

## 2022-09-02 ENCOUNTER — HOSPITAL ENCOUNTER (OUTPATIENT)
Dept: RADIOLOGY | Facility: HOSPITAL | Age: 67
Discharge: HOME | End: 2022-09-02
Attending: INTERNAL MEDICINE
Payer: MEDICARE

## 2022-09-02 DIAGNOSIS — C34.91 ADENOCARCINOMA, LUNG, RIGHT: ICD-10-CM

## 2022-09-06 ENCOUNTER — HOSPITAL ENCOUNTER (OUTPATIENT)
Dept: RADIOLOGY | Facility: HOSPITAL | Age: 67
Discharge: HOME | End: 2022-09-06
Attending: INTERNAL MEDICINE
Payer: MEDICARE

## 2022-09-06 PROCEDURE — 78815 NM PET CT ROUTINE: ICD-10-PCS | Mod: 26,PI,, | Performed by: STUDENT IN AN ORGANIZED HEALTH CARE EDUCATION/TRAINING PROGRAM

## 2022-09-06 PROCEDURE — 78815 PET IMAGE W/CT SKULL-THIGH: CPT | Mod: 26,PI,, | Performed by: STUDENT IN AN ORGANIZED HEALTH CARE EDUCATION/TRAINING PROGRAM

## 2022-09-06 PROCEDURE — 78815 PET IMAGE W/CT SKULL-THIGH: CPT | Mod: TC

## 2022-09-06 NOTE — PROGRESS NOTES
"History & Physical    SUBJECTIVE:     History of Present Illness:  Patient is a 67 y.o. male former smoker with COPD, DM, and prostate cancer here today for evaluation of RLL adenocarcinoma. LDCT in May showed RLL 1.6 cm opacity. Treated for infection. Repeat CT with persistent RLL nodule with new RUL nodule. Robotic bronchoscopy of RLL returned adenocarcinoma while RUL lesion was inflammatory. LN negative. PET    Former smoker. Quit 5 months ago.   PSH: bronchoscopy     No chief complaint on file.      Review of patient's allergies indicates:  No Known Allergies    Current Outpatient Medications   Medication Sig Dispense Refill    albuterol (PROVENTIL/VENTOLIN HFA) 90 mcg/actuation inhaler Inhale 2 puffs into the lungs every 6 (six) hours as needed. 18 g 11    albuterol (PROVENTIL/VENTOLIN HFA) 90 mcg/actuation inhaler 1 puff as needed      BD BRENNA 2ND GEN PEN NEEDLE 32 gauge x 5/32" Ndle USE TO INJECT INSULIN UNDER THE SKIN EVERY DAY      bicalutamide (CASODEX) 50 MG Tab Take 1 tablet (50 mg total) by mouth once daily. 30 tablet 11    cholecalciferol, vitamin D3, 1,250 mcg (50,000 unit) capsule TAKE 1 CAPSULE BY MOUTH 1 TIME EVERY WEEK      fluticasone propionate (FLONASE) 50 mcg/actuation nasal spray       levocetirizine (XYZAL) 5 MG tablet Take 1 tablet (5 mg total) by mouth every evening. 30 tablet 11    metFORMIN (GLUCOPHAGE) 1000 MG tablet       prednisoLONE acetate (PRED FORTE) 1 % DrpS SHAKE LIQUID AND INSTILL 1 DROP IN LEFT EYE FOUR TIMES DAILY AFTER SURGERY      simvastatin (ZOCOR) 40 MG tablet Take 1 tablet by mouth.      tamsulosin (FLOMAX) 0.4 mg Cap Take 1 capsule by mouth.      TRESIBA FLEXTOUCH U-100 100 unit/mL (3 mL) InPn INJECT 20 UNITS UNDER THE SKIN ONCE DAILY      umeclidinium-vilanteroL (ANORO ELLIPTA) 62.5-25 mcg/actuation DsDv Inhale 1 puff into the lungs once daily. Controller 1 each 5     Current Facility-Administered Medications   Medication Dose Route Frequency Provider Last Rate Last " Admin    leuprolide (6 month) (ELIGARD) injection 45 mg  45 mg Subcutaneous Q6 Months Chey Saini, NP   45 mg at 22 1057       Past Medical History:   Diagnosis Date    COPD (chronic obstructive pulmonary disease)     Diabetes mellitus     Prostate cancer      Past Surgical History:   Procedure Laterality Date    ROBOTIC BRONCHOSCOPY N/A 2022    Procedure: ROBOTIC BRONCHOSCOPY;  Surgeon: Laney Bro MD;  Location: [facility] OR [address] [place] [address];  Service: Pulmonary;  Laterality: N/A;     Family History   Problem Relation Age of Onset    Cancer Father     Diabetes Mother     Melanoma Neg Hx      Social History     Tobacco Use    Smoking status: Former     Types: Cigarettes     Quit date: 2022     Years since quittin.4    Smokeless tobacco: Never   Substance Use Topics    Alcohol use: Yes    Drug use: Never        Review of Systems:  Review of Systems   Constitutional: Negative.    HENT: Negative.     Respiratory: Negative.     Cardiovascular: Negative.    Musculoskeletal: Negative.    Neurological: Negative.    Psychiatric/Behavioral: Negative.     All other systems reviewed and are negative.    OBJECTIVE:     Vital Signs (Most Recent)  There were no vitals filed for this visit.      Physical Exam:  Physical Exam  Constitutional:       Appearance: Normal appearance. He is normal weight.   HENT:      Head: Normocephalic and atraumatic.   Eyes:      Extraocular Movements: Extraocular movements intact.      Conjunctiva/sclera: Conjunctivae normal.      Pupils: Pupils are equal, round, and reactive to light.   Cardiovascular:      Rate and Rhythm: Normal rate.      Pulses: Normal pulses.   Pulmonary:      Effort: Pulmonary effort is normal.      Breath sounds: Normal breath sounds.   Abdominal:      Palpations: Abdomen is soft.   Musculoskeletal:         General: Normal range of motion.   Skin:     General: Skin is warm and dry.      Capillary Refill: Capillary refill takes less than 2 seconds.    Neurological:      General: No focal deficit present.      Mental Status: He is alert and oriented to person, place, and time.   Psychiatric:         Mood and Affect: Mood normal.         Behavior: Behavior normal.         Thought Content: Thought content normal.         Judgment: Judgment normal.       LDCT 4/29/22:  I reviewed the images  There are abnormal opacities that require further evaluation.  The largest opacity in the right lung is irregular and solid and measures 1.6 cm x 1.7 cm on series 4, image 256.  There are multiple pleural based 3-4 mm nodules throughout the left lung.  For reference, the 4 mm nodule is identified on series 4, image 216. The lungs show findings consistent with emphysema  Lung-RADS Category:  4X - Suspicious - consultation advised - possible next steps  Chest CT, tissue sampling and-or PET/CT.  Tissue sampling is strongly encouraged for this finding.  Please note that this finding is radiographically occult and not seen on the chest x-ray performed immediately prior to this exam.  Clinically or potentially clinically significant non lung cancer finding:  S - Significant.  Prior Lung Cancer Modifier:  No history of prior lung cancer.    PFTS 5/11/22  FEV1: 1.21L 42%  DLCO: 7.45  26%    Chest CT 7/20/22:  I reviewed the images  Scattered pulmonary nodules in the right lung with irregular margins remain similar in number.  Dominant nodule in the right lower lobe is slightly increased in size though may be slightly less solid as compared to prior.  Malignancy remains a primary consideration; correlation with tissue sampling may be required for definitive diagnosis.    Pathology   RLL = adenocarcinoma  RUL = negative for malignancy   LN 7 and 11 = negative for malignancy     PET 9/6/22:  I reviewed the images  1. Mildly hypermetabolic right lower lobe spiculated nodule representing patient's biopsy-proven lung cancer.  No PET-CT evidence of distant metastatic disease.  2. Additional  stable scattered right lung ground-glass and solid nodules.  No significant corresponding radiotracer uptake however noting that several are below the size threshold for PET.  Attention on follow-up.    ASSESSMENT/PLAN:     Patient is a 67 y.o. male former smoker with COPD, DM, and prostate cancer here today for evaluation of RLL adenocarcinoma.  Severely impaired lung function by pulmonary function testing analysis  PLAN:Plan   I discussed the technical aspects risks and benefits of anatomic right lower lobe superior segmentectomy with mediastinal lymph node dissection.  While surgery is technically feasible, I have concerns about Mr. Robles' functional status.  He is at an increased perioperative risk of respiratory failure.  The low diffusion capacity raises significant concern about whether or not he would tolerate single lung anesthesia.  He will discuss radiation therapy as a potential localized treatment option and make a decision.

## 2022-09-07 ENCOUNTER — OFFICE VISIT (OUTPATIENT)
Dept: RADIATION ONCOLOGY | Facility: CLINIC | Age: 67
End: 2022-09-07
Payer: MEDICARE

## 2022-09-07 ENCOUNTER — OFFICE VISIT (OUTPATIENT)
Dept: CARDIOTHORACIC SURGERY | Facility: CLINIC | Age: 67
End: 2022-09-07
Payer: MEDICARE

## 2022-09-07 ENCOUNTER — DOCUMENTATION ONLY (OUTPATIENT)
Dept: HEMATOLOGY/ONCOLOGY | Facility: CLINIC | Age: 67
End: 2022-09-07
Payer: MEDICARE

## 2022-09-07 VITALS
WEIGHT: 169.31 LBS | TEMPERATURE: 98 F | WEIGHT: 169.31 LBS | HEART RATE: 73 BPM | SYSTOLIC BLOOD PRESSURE: 148 MMHG | OXYGEN SATURATION: 95 % | HEART RATE: 73 BPM | HEIGHT: 70 IN | HEIGHT: 70 IN | RESPIRATION RATE: 16 BRPM | DIASTOLIC BLOOD PRESSURE: 84 MMHG | BODY MASS INDEX: 24.24 KG/M2 | OXYGEN SATURATION: 95 % | SYSTOLIC BLOOD PRESSURE: 148 MMHG | DIASTOLIC BLOOD PRESSURE: 84 MMHG | BODY MASS INDEX: 24.24 KG/M2

## 2022-09-07 DIAGNOSIS — C34.91 ADENOCARCINOMA, LUNG, RIGHT: ICD-10-CM

## 2022-09-07 DIAGNOSIS — C34.31 PRIMARY MALIGNANT NEOPLASM OF RIGHT LOWER LOBE OF LUNG: ICD-10-CM

## 2022-09-07 PROCEDURE — 3008F BODY MASS INDEX DOCD: CPT | Mod: CPTII,S$GLB,, | Performed by: THORACIC SURGERY (CARDIOTHORACIC VASCULAR SURGERY)

## 2022-09-07 PROCEDURE — 99205 PR OFFICE/OUTPT VISIT, NEW, LEVL V, 60-74 MIN: ICD-10-PCS | Mod: S$GLB,,, | Performed by: THORACIC SURGERY (CARDIOTHORACIC VASCULAR SURGERY)

## 2022-09-07 PROCEDURE — 1101F PT FALLS ASSESS-DOCD LE1/YR: CPT | Mod: CPTII,S$GLB,, | Performed by: RADIOLOGY

## 2022-09-07 PROCEDURE — 3079F DIAST BP 80-89 MM HG: CPT | Mod: CPTII,S$GLB,, | Performed by: RADIOLOGY

## 2022-09-07 PROCEDURE — 3288F FALL RISK ASSESSMENT DOCD: CPT | Mod: CPTII,S$GLB,, | Performed by: RADIOLOGY

## 2022-09-07 PROCEDURE — 1159F MED LIST DOCD IN RCRD: CPT | Mod: CPTII,S$GLB,, | Performed by: RADIOLOGY

## 2022-09-07 PROCEDURE — 3288F FALL RISK ASSESSMENT DOCD: CPT | Mod: CPTII,S$GLB,, | Performed by: THORACIC SURGERY (CARDIOTHORACIC VASCULAR SURGERY)

## 2022-09-07 PROCEDURE — 3077F PR MOST RECENT SYSTOLIC BLOOD PRESSURE >= 140 MM HG: ICD-10-PCS | Mod: CPTII,S$GLB,, | Performed by: THORACIC SURGERY (CARDIOTHORACIC VASCULAR SURGERY)

## 2022-09-07 PROCEDURE — 99999 PR PBB SHADOW E&M-EST. PATIENT-LVL IV: CPT | Mod: PBBFAC,,, | Performed by: RADIOLOGY

## 2022-09-07 PROCEDURE — 3079F DIAST BP 80-89 MM HG: CPT | Mod: CPTII,S$GLB,, | Performed by: THORACIC SURGERY (CARDIOTHORACIC VASCULAR SURGERY)

## 2022-09-07 PROCEDURE — 1101F PR PT FALLS ASSESS DOC 0-1 FALLS W/OUT INJ PAST YR: ICD-10-PCS | Mod: CPTII,S$GLB,, | Performed by: THORACIC SURGERY (CARDIOTHORACIC VASCULAR SURGERY)

## 2022-09-07 PROCEDURE — 1126F PR PAIN SEVERITY QUANTIFIED, NO PAIN PRESENT: ICD-10-PCS | Mod: CPTII,S$GLB,, | Performed by: RADIOLOGY

## 2022-09-07 PROCEDURE — 3288F PR FALLS RISK ASSESSMENT DOCUMENTED: ICD-10-PCS | Mod: CPTII,S$GLB,, | Performed by: THORACIC SURGERY (CARDIOTHORACIC VASCULAR SURGERY)

## 2022-09-07 PROCEDURE — 99999 PR PBB SHADOW E&M-EST. PATIENT-LVL IV: ICD-10-PCS | Mod: PBBFAC,,, | Performed by: RADIOLOGY

## 2022-09-07 PROCEDURE — 99999 PR PBB SHADOW E&M-EST. PATIENT-LVL III: CPT | Mod: PBBFAC,,, | Performed by: THORACIC SURGERY (CARDIOTHORACIC VASCULAR SURGERY)

## 2022-09-07 PROCEDURE — 1101F PR PT FALLS ASSESS DOC 0-1 FALLS W/OUT INJ PAST YR: ICD-10-PCS | Mod: CPTII,S$GLB,, | Performed by: RADIOLOGY

## 2022-09-07 PROCEDURE — 1101F PT FALLS ASSESS-DOCD LE1/YR: CPT | Mod: CPTII,S$GLB,, | Performed by: THORACIC SURGERY (CARDIOTHORACIC VASCULAR SURGERY)

## 2022-09-07 PROCEDURE — 1126F PR PAIN SEVERITY QUANTIFIED, NO PAIN PRESENT: ICD-10-PCS | Mod: CPTII,S$GLB,, | Performed by: THORACIC SURGERY (CARDIOTHORACIC VASCULAR SURGERY)

## 2022-09-07 PROCEDURE — 99999 PR PBB SHADOW E&M-EST. PATIENT-LVL III: ICD-10-PCS | Mod: PBBFAC,,, | Performed by: THORACIC SURGERY (CARDIOTHORACIC VASCULAR SURGERY)

## 2022-09-07 PROCEDURE — 3079F PR MOST RECENT DIASTOLIC BLOOD PRESSURE 80-89 MM HG: ICD-10-PCS | Mod: CPTII,S$GLB,, | Performed by: THORACIC SURGERY (CARDIOTHORACIC VASCULAR SURGERY)

## 2022-09-07 PROCEDURE — 99215 PR OFFICE/OUTPT VISIT, EST, LEVL V, 40-54 MIN: ICD-10-PCS | Mod: S$GLB,,, | Performed by: RADIOLOGY

## 2022-09-07 PROCEDURE — 3077F PR MOST RECENT SYSTOLIC BLOOD PRESSURE >= 140 MM HG: ICD-10-PCS | Mod: CPTII,S$GLB,, | Performed by: RADIOLOGY

## 2022-09-07 PROCEDURE — 1126F AMNT PAIN NOTED NONE PRSNT: CPT | Mod: CPTII,S$GLB,, | Performed by: THORACIC SURGERY (CARDIOTHORACIC VASCULAR SURGERY)

## 2022-09-07 PROCEDURE — 99205 OFFICE O/P NEW HI 60 MIN: CPT | Mod: S$GLB,,, | Performed by: THORACIC SURGERY (CARDIOTHORACIC VASCULAR SURGERY)

## 2022-09-07 PROCEDURE — 1126F AMNT PAIN NOTED NONE PRSNT: CPT | Mod: CPTII,S$GLB,, | Performed by: RADIOLOGY

## 2022-09-07 PROCEDURE — 3077F SYST BP >= 140 MM HG: CPT | Mod: CPTII,S$GLB,, | Performed by: RADIOLOGY

## 2022-09-07 PROCEDURE — 99215 OFFICE O/P EST HI 40 MIN: CPT | Mod: S$GLB,,, | Performed by: RADIOLOGY

## 2022-09-07 PROCEDURE — 3079F PR MOST RECENT DIASTOLIC BLOOD PRESSURE 80-89 MM HG: ICD-10-PCS | Mod: CPTII,S$GLB,, | Performed by: RADIOLOGY

## 2022-09-07 PROCEDURE — 3288F PR FALLS RISK ASSESSMENT DOCUMENTED: ICD-10-PCS | Mod: CPTII,S$GLB,, | Performed by: RADIOLOGY

## 2022-09-07 PROCEDURE — 3008F PR BODY MASS INDEX (BMI) DOCUMENTED: ICD-10-PCS | Mod: CPTII,S$GLB,, | Performed by: THORACIC SURGERY (CARDIOTHORACIC VASCULAR SURGERY)

## 2022-09-07 PROCEDURE — 3008F BODY MASS INDEX DOCD: CPT | Mod: CPTII,S$GLB,, | Performed by: RADIOLOGY

## 2022-09-07 PROCEDURE — 3008F PR BODY MASS INDEX (BMI) DOCUMENTED: ICD-10-PCS | Mod: CPTII,S$GLB,, | Performed by: RADIOLOGY

## 2022-09-07 PROCEDURE — 1159F PR MEDICATION LIST DOCUMENTED IN MEDICAL RECORD: ICD-10-PCS | Mod: CPTII,S$GLB,, | Performed by: RADIOLOGY

## 2022-09-07 PROCEDURE — 3077F SYST BP >= 140 MM HG: CPT | Mod: CPTII,S$GLB,, | Performed by: THORACIC SURGERY (CARDIOTHORACIC VASCULAR SURGERY)

## 2022-09-07 NOTE — PROGRESS NOTES
09/07/2022    Radiation Oncology Consultation    Assessment   This is a 67 y.o. y/o male with at least Stage IA3 (cT1c cN0 M0) RLL NSCLC (adeno) diagnosed on robotic bronch w/ EBUS 8/12/22. Biopsy of a RUL nodule, stations 7 and 11L/R nodes were negative for malignancy. PET/CT 9/6/22 demonstrated mild uptake in RLL nodule only. He is referred for consideration of treatment options.   History significant for high risk prostate cancer s/p EBRT 70 Gy in 28 fx +ADT with Dr. Villalobos completed 11/2020.     I discussed treatment options for early stage NSCLC including surgical resection and stereotactic body radiotherapy (SBRT). He met with Dr. Mitchell earlier this morning who advised that although surgery is technically feasible, the patient may have difficulty undergoing single lung ventilation at time of surgery. After discussion of risks/benefits of surgery vs radiation, the patient prefers to proceed with SBRT. I recommend treating the RLL primary to 50-55 Gy in 4-5 fractions, which offers local control in 90% of patients. Potential short- and long-term risks of treatment were reviewed with the patient, particularly pneumonitis.       Plan   1) Schedule CT Simulation for SBRT lung treatment planning         CHIEF COMPLAINT: Early-stage NSCLC    HPI: Mr. Robles is a 66 y/o male with h/o high risk prostate cancer s/p EBRT 70 Gy in 28 fx +ADT with Dr. Villalobos completed 11/2020. Screening CT Chest 4/29/22 demonstrated a 1.7 cm Right lung opacity with several other smaller Right lung nodules. This was followed with repeat CT Chest 7/29/22 that demonstrated slight increase in RLL nodule to 1.9 cm along with a smaller adjacent nodule. On sagittal images, these appear somewhat confluent measuring 2.4 cm total. On 8/12/22 he underwent robotic bronch w/ EBUS by Dr. Bro with biopsy of RLL nodule revealing adenocarcinoma; biopsies of RUL nodule, stations 7 and 11L/R were negative for malignancy. PET/CT 9/6/22  "demonstrated mild uptake in the biopsied RLL nodule only. He is referred for consideration of treatment options.     In clinic today, he is accompanied by his daughter. He reports dyspnea on exertion but otherwise feels well and denies fevers, weight loss, chest pain, or cough.       Is the patient female between ages 15-55:  no    Does the patient have a CIED:  no    Prior Radiation History:   Course 1:   Treatment Site  Treatment Fields and [employer]  Dose/Fx (Gy)  #Fx  Total Dose (Gy)  Start Date  End Date    Prostate and pelvic nodes  VMAT with 6 MV photons  2.5    70  10/1/2020  11/10/2020            Past Medical History:   Diagnosis Date    COPD (chronic obstructive pulmonary disease)     Diabetes mellitus     Prostate cancer        Past Surgical History:   Procedure Laterality Date    ROBOTIC BRONCHOSCOPY N/A 2022    Procedure: ROBOTIC BRONCHOSCOPY;  Surgeon: Laney Bro MD;  Location: [facility] OR [address] [place] [address];  Service: Pulmonary;  Laterality: N/A;       Social History     Tobacco Use    Smoking status: Former     Types: Cigarettes     Quit date: 2022     Years since quittin.4    Smokeless tobacco: Never   Substance Use Topics    Alcohol use: Yes    Drug use: Never       Cancer-related family history includes Cancer in his father. There is no history of Melanoma.    Current Outpatient Medications on File Prior to Visit   Medication Sig Dispense Refill    albuterol (PROVENTIL/VENTOLIN HFA) 90 mcg/actuation inhaler Inhale 2 puffs into the lungs every 6 (six) hours as needed. 18 g 11    albuterol (PROVENTIL/VENTOLIN HFA) 90 mcg/actuation inhaler 1 puff as needed      BD BRENNA 2ND GEN PEN NEEDLE 32 gauge x 5/32" Ndle USE TO INJECT INSULIN UNDER THE SKIN EVERY DAY      bicalutamide (CASODEX) 50 MG Tab Take 1 tablet (50 mg total) by mouth once daily. 30 tablet 11    cholecalciferol, vitamin D3, 1,250 mcg (50,000 unit) capsule TAKE 1 CAPSULE BY MOUTH 1 TIME EVERY WEEK      fluticasone propionate " "(FLONASE) 50 mcg/actuation nasal spray       levocetirizine (XYZAL) 5 MG tablet Take 1 tablet (5 mg total) by mouth every evening. 30 tablet 11    metFORMIN (GLUCOPHAGE) 1000 MG tablet       prednisoLONE acetate (PRED FORTE) 1 % DrpS SHAKE LIQUID AND INSTILL 1 DROP IN LEFT EYE FOUR TIMES DAILY AFTER SURGERY      simvastatin (ZOCOR) 40 MG tablet Take 1 tablet by mouth.      tamsulosin (FLOMAX) 0.4 mg Cap Take 1 capsule by mouth.      TRESIBA FLEXTOUCH U-100 100 unit/mL (3 mL) InPn INJECT 20 UNITS UNDER THE SKIN ONCE DAILY      umeclidinium-vilanteroL (ANORO ELLIPTA) 62.5-25 mcg/actuation DsDv Inhale 1 puff into the lungs once daily. Controller 1 each 5     Current Facility-Administered Medications on File Prior to Visit   Medication Dose Route Frequency Provider Last Rate Last Admin    leuprolide (6 month) (ELIGARD) injection 45 mg  45 mg Subcutaneous Q6 Months Chey Saini NP   45 mg at 08/16/22 1057       Review of patient's allergies indicates:  No Known Allergies    Review of Systems   Constitutional:  Negative for fever and weight loss.   HENT:  Negative for ear pain and sore throat.    Eyes:  Negative for blurred vision and double vision.   Respiratory:  Positive for cough. Negative for hemoptysis and shortness of breath.    Cardiovascular:  Negative for chest pain and leg swelling.   Gastrointestinal:  Negative for abdominal pain, constipation, diarrhea, heartburn and nausea.   Genitourinary:  Negative for dysuria and hematuria.   Musculoskeletal:  Negative for falls and joint pain.   Neurological:  Negative for tingling, speech change, focal weakness, seizures and headaches.   Psychiatric/Behavioral:  Negative for depression. The patient is not nervous/anxious.       Vital Signs: BP (!) 148/84   Pulse 73   Temp 97.5 °F (36.4 °C)   Resp 16   Ht 5' 10" (1.778 m)   Wt 76.8 kg (169 lb 5 oz)   SpO2 95%   BMI 24.29 kg/m²     ECOG Performance Status: 1 - Ambulates, capable of light work    Physical " Exam  Vitals reviewed.   Constitutional:       Appearance: Normal appearance.   HENT:      Head: Normocephalic and atraumatic.   Eyes:      General: No scleral icterus.     Extraocular Movements: Extraocular movements intact.   Pulmonary:      Effort: Pulmonary effort is normal. No respiratory distress.   Abdominal:      General: There is no distension.   Musculoskeletal:      Cervical back: Neck supple.   Lymphadenopathy:      Cervical: No cervical adenopathy.   Skin:     General: Skin is warm and dry.   Neurological:      General: No focal deficit present.      Mental Status: He is alert and oriented to person, place, and time.      Cranial Nerves: No cranial nerve deficit.   Psychiatric:         Mood and Affect: Mood normal.         Behavior: Behavior normal.         Judgment: Judgment normal.        Labs:    Imaging: I have personally reviewed the patient's available images and reports and summarized pertinent findings above in HPI.     Pathology: I have personally reviewed the patient's available pathology and summarized pertinent findings above in HPI.       - Thank you for allowing me to participate in the care of your patient.    Sathish Vasquez MD, PhD

## 2022-09-07 NOTE — NURSING
Met with patient and his daughter after his visit with Dr. Mitchell.  Discussed some available resources to [facility] patients.  Discussed the role of the Oncology Navigator. Will follow-up.  Oncology Navigation   Intake  Date of Diagnosis: 08/22/22  Cancer Type: Thoracic  Internal / External Referral: Internal  Referral Source: Dr. Bro  Date of Referral: 08/22/22  Initial Nurse Navigator Contact: 08/23/22  Referral to Initial Contact Timeline (days): 1  Date Worked: 09/07/22  First Appointment Available: 09/07/22  Appointment Date: 09/07/22  First Available Date vs. Scheduled Date (days): 0  Multiple appointments: Yes  Reason if booked > 7 days after scheduling: Additional tests/procedures     Treatment  Current Status: Active    Surgical Oncologist: Paula       Radiation Oncologist: Pedro             Radiation Oncologist: Pedro    Support Systems: Children     Acuity   Needed: 0  Support: 0  Verbalizes Financial Concerns: 0  Transportation: 0  History of noncompliance/frequent no shows and cancellations: 0  Verbalizes the need for more education: 0  Navigation Acuity: 0     Follow Up  No follow-ups on file.

## 2022-09-08 ENCOUNTER — HOSPITAL ENCOUNTER (OUTPATIENT)
Dept: RADIATION THERAPY | Facility: HOSPITAL | Age: 67
Discharge: HOME | End: 2022-09-08
Attending: RADIOLOGY
Payer: MEDICARE

## 2022-09-08 PROCEDURE — 77014 PR  CT GUIDANCE PLACEMENT RAD THERAPY FIELDS: CPT | Mod: 26,,, | Performed by: RADIOLOGY

## 2022-09-08 PROCEDURE — 77014 HC CT GUIDANCE RADIATION THERAPY FLDS PLACEMENT: CPT | Mod: TC | Performed by: RADIOLOGY

## 2022-09-08 PROCEDURE — 77014 PR  CT GUIDANCE PLACEMENT RAD THERAPY FIELDS: ICD-10-PCS | Mod: 26,,, | Performed by: RADIOLOGY

## 2022-09-08 PROCEDURE — 77263 THER RADIOLOGY TX PLNG CPLX: CPT | Mod: ,,, | Performed by: RADIOLOGY

## 2022-09-08 PROCEDURE — 77263 PR  RADIATION THERAPY PLAN COMPLEX: ICD-10-PCS | Mod: ,,, | Performed by: RADIOLOGY

## 2022-09-08 PROCEDURE — 77334 RADIATION TREATMENT AID(S): CPT | Mod: TC | Performed by: RADIOLOGY

## 2022-09-08 PROCEDURE — 77334 RADIATION TREATMENT AID(S): CPT | Mod: 26,,, | Performed by: RADIOLOGY

## 2022-09-08 PROCEDURE — 77334 PR  RADN TREATMENT AID(S) COMPLX: ICD-10-PCS | Mod: 26,,, | Performed by: RADIOLOGY

## 2022-09-13 PROCEDURE — 77301 PR  INTEN MOD RADIOTHER PLAN W/DOSE VOL HIST: ICD-10-PCS | Mod: 26,,, | Performed by: RADIOLOGY

## 2022-09-13 PROCEDURE — 77293 RESPIRATOR MOTION MGMT SIMUL: CPT | Mod: TC | Performed by: RADIOLOGY

## 2022-09-13 PROCEDURE — 77301 RADIOTHERAPY DOSE PLAN IMRT: CPT | Mod: TC | Performed by: RADIOLOGY

## 2022-09-13 PROCEDURE — 77301 RADIOTHERAPY DOSE PLAN IMRT: CPT | Mod: 26,,, | Performed by: RADIOLOGY

## 2022-09-14 PROCEDURE — 77338 PR  MLC IMRT DESIGN & CONSTRUCTION PER IMRT PLAN: ICD-10-PCS | Mod: 26,,, | Performed by: RADIOLOGY

## 2022-09-14 PROCEDURE — 77370 RADIATION PHYSICS CONSULT: CPT | Performed by: RADIOLOGY

## 2022-09-14 PROCEDURE — 77300 RADIATION THERAPY DOSE PLAN: CPT | Mod: TC | Performed by: RADIOLOGY

## 2022-09-14 PROCEDURE — 77338 DESIGN MLC DEVICE FOR IMRT: CPT | Mod: 26,,, | Performed by: RADIOLOGY

## 2022-09-14 PROCEDURE — 77300 RADIATION THERAPY DOSE PLAN: CPT | Mod: 26,,, | Performed by: RADIOLOGY

## 2022-09-14 PROCEDURE — 77300 PR RADIATION THERAPY,DOSIMETRY PLAN: ICD-10-PCS | Mod: 26,,, | Performed by: RADIOLOGY

## 2022-09-14 PROCEDURE — 77338 DESIGN MLC DEVICE FOR IMRT: CPT | Mod: TC | Performed by: RADIOLOGY

## 2022-09-15 ENCOUNTER — DOCUMENTATION ONLY (OUTPATIENT)
Dept: HEMATOLOGY/ONCOLOGY | Facility: CLINIC | Age: 67
End: 2022-09-15
Payer: MEDICARE

## 2022-09-15 PROCEDURE — 77470 PR  SPECIAL RADIATION TREATMENT: ICD-10-PCS | Mod: 26,59,, | Performed by: RADIOLOGY

## 2022-09-15 PROCEDURE — 77470 SPECIAL RADIATION TREATMENT: CPT | Mod: 59,TC | Performed by: RADIOLOGY

## 2022-09-15 PROCEDURE — 77470 SPECIAL RADIATION TREATMENT: CPT | Mod: 26,59,, | Performed by: RADIOLOGY

## 2022-09-15 NOTE — PROGRESS NOTES
Received referral from the clinic that the patient needed assistance with transportation to radiation appointment on 9/20. Contacted the patient and his daughter. Explained cab service to them. Provided him the number to [employer] with [employer] and explained he needed to call when he was ready to go home. He would like a 10:40 am . He will call me after his appointment to let me know his schedule for future rides. Contacted eduar with [employer] to secure ride.

## 2022-09-16 LAB
ADEQUACY: ABNORMAL
FINAL PATHOLOGIC DIAGNOSIS: ABNORMAL
Lab: ABNORMAL
SUPPLEMENTAL DIAGNOSIS: ABNORMAL

## 2022-09-20 ENCOUNTER — DOCUMENTATION ONLY (OUTPATIENT)
Dept: HEMATOLOGY/ONCOLOGY | Facility: CLINIC | Age: 67
End: 2022-09-20
Payer: MEDICARE

## 2022-09-20 NOTE — PROGRESS NOTES
Received referral from the clinic that patient needs transportation for radiation for the 21st, 22nd, 23rd, 26th and 27th. Needs  time of 1:30 pm. Called Eduar with [employer] and secured the ride.

## 2022-09-21 PROCEDURE — 77014 HC CT GUIDANCE RADIATION THERAPY FLDS PLACEMENT: CPT | Mod: TC | Performed by: RADIOLOGY

## 2022-09-21 PROCEDURE — 77014 PR  CT GUIDANCE PLACEMENT RAD THERAPY FIELDS: CPT | Mod: 26,,, | Performed by: RADIOLOGY

## 2022-09-21 PROCEDURE — 77373 STRTCTC BDY RAD THER TX DLVR: CPT | Performed by: RADIOLOGY

## 2022-09-21 PROCEDURE — 77014 PR  CT GUIDANCE PLACEMENT RAD THERAPY FIELDS: ICD-10-PCS | Mod: 26,,, | Performed by: RADIOLOGY

## 2022-09-22 DIAGNOSIS — C34.31 PRIMARY MALIGNANT NEOPLASM OF RIGHT LOWER LOBE OF LUNG: Primary | ICD-10-CM

## 2022-09-22 PROCEDURE — 77014 HC CT GUIDANCE RADIATION THERAPY FLDS PLACEMENT: CPT | Mod: TC,59 | Performed by: RADIOLOGY

## 2022-09-22 PROCEDURE — 77373 STRTCTC BDY RAD THER TX DLVR: CPT | Performed by: RADIOLOGY

## 2022-09-22 PROCEDURE — 77014 PR  CT GUIDANCE PLACEMENT RAD THERAPY FIELDS: ICD-10-PCS | Mod: 26,,, | Performed by: RADIOLOGY

## 2022-09-22 PROCEDURE — 77014 PR  CT GUIDANCE PLACEMENT RAD THERAPY FIELDS: CPT | Mod: 26,,, | Performed by: RADIOLOGY

## 2022-09-23 PROCEDURE — 77014 PR  CT GUIDANCE PLACEMENT RAD THERAPY FIELDS: CPT | Mod: 26,,, | Performed by: RADIOLOGY

## 2022-09-23 PROCEDURE — 77373 STRTCTC BDY RAD THER TX DLVR: CPT | Performed by: RADIOLOGY

## 2022-09-23 PROCEDURE — 77014 HC CT GUIDANCE RADIATION THERAPY FLDS PLACEMENT: CPT | Mod: TC,59 | Performed by: RADIOLOGY

## 2022-09-23 PROCEDURE — 77014 PR  CT GUIDANCE PLACEMENT RAD THERAPY FIELDS: ICD-10-PCS | Mod: 26,,, | Performed by: RADIOLOGY

## 2022-09-26 PROCEDURE — 77014 HC CT GUIDANCE RADIATION THERAPY FLDS PLACEMENT: CPT | Mod: TC | Performed by: RADIOLOGY

## 2022-09-26 PROCEDURE — 77014 PR  CT GUIDANCE PLACEMENT RAD THERAPY FIELDS: ICD-10-PCS | Mod: 26,,, | Performed by: RADIOLOGY

## 2022-09-26 PROCEDURE — 77373 STRTCTC BDY RAD THER TX DLVR: CPT | Performed by: RADIOLOGY

## 2022-09-26 PROCEDURE — 77014 PR  CT GUIDANCE PLACEMENT RAD THERAPY FIELDS: CPT | Mod: 26,,, | Performed by: RADIOLOGY

## 2022-09-27 PROCEDURE — 77014 HC CT GUIDANCE RADIATION THERAPY FLDS PLACEMENT: CPT | Mod: TC | Performed by: RADIOLOGY

## 2022-09-27 PROCEDURE — 77014 PR  CT GUIDANCE PLACEMENT RAD THERAPY FIELDS: CPT | Mod: 26,,, | Performed by: RADIOLOGY

## 2022-09-27 PROCEDURE — 77014 PR  CT GUIDANCE PLACEMENT RAD THERAPY FIELDS: ICD-10-PCS | Mod: 26,,, | Performed by: RADIOLOGY

## 2022-09-27 PROCEDURE — 77373 STRTCTC BDY RAD THER TX DLVR: CPT | Performed by: RADIOLOGY

## 2022-09-28 PROCEDURE — 77336 RADIATION PHYSICS CONSULT: CPT | Performed by: RADIOLOGY

## 2022-10-11 DIAGNOSIS — C34.31 PRIMARY MALIGNANT NEOPLASM OF RIGHT LOWER LOBE OF LUNG: Primary | ICD-10-CM

## 2022-10-11 DIAGNOSIS — C61 PROSTATE CANCER: ICD-10-CM

## 2022-10-13 ENCOUNTER — TELEPHONE (OUTPATIENT)
Dept: RADIATION ONCOLOGY | Facility: CLINIC | Age: 67
End: 2022-10-13
Payer: MEDICARE

## 2022-10-13 NOTE — TELEPHONE ENCOUNTER
----- Message from Argenis Kurtz RN sent at 10/4/2022  7:41 AM CDT -----  Sbrt lung c 27 cs f/u made

## 2022-11-07 ENCOUNTER — LAB VISIT (OUTPATIENT)
Dept: LAB | Facility: OTHER | Age: 67
End: 2022-11-07
Attending: NURSE PRACTITIONER
Payer: MEDICARE

## 2022-11-07 DIAGNOSIS — C34.31 PRIMARY MALIGNANT NEOPLASM OF RIGHT LOWER LOBE OF LUNG: ICD-10-CM

## 2022-11-07 DIAGNOSIS — C61 PROSTATE CANCER: ICD-10-CM

## 2022-11-07 LAB
ALBUMIN SERPL BCP-MCNC: 3.8 G/DL (ref 3.5–5.2)
ALP SERPL-CCNC: 107 U/L (ref 55–135)
ALT SERPL W/O P-5'-P-CCNC: 17 U/L (ref 10–44)
ANION GAP SERPL CALC-SCNC: 9 MMOL/L (ref 8–16)
AST SERPL-CCNC: 14 U/L (ref 10–40)
BASOPHILS # BLD AUTO: 0.02 K/UL (ref 0–0.2)
BASOPHILS NFR BLD: 0.4 % (ref 0–1.9)
BILIRUB SERPL-MCNC: 0.3 MG/DL (ref 0.1–1)
BUN SERPL-MCNC: 20 MG/DL (ref 8–23)
CALCIUM SERPL-MCNC: 10.2 MG/DL (ref 8.7–10.5)
CHLORIDE SERPL-SCNC: 105 MMOL/L (ref 95–110)
CO2 SERPL-SCNC: 26 MMOL/L (ref 23–29)
CREAT SERPL-MCNC: 1.3 MG/DL (ref 0.5–1.4)
DIFFERENTIAL METHOD: ABNORMAL
EOSINOPHIL # BLD AUTO: 0.3 K/UL (ref 0–0.5)
EOSINOPHIL NFR BLD: 7.3 % (ref 0–8)
ERYTHROCYTE [DISTWIDTH] IN BLOOD BY AUTOMATED COUNT: 13.4 % (ref 11.5–14.5)
EST. GFR  (NO RACE VARIABLE): 60 ML/MIN/1.73 M^2
GLUCOSE SERPL-MCNC: 223 MG/DL (ref 70–110)
HCT VFR BLD AUTO: 41.7 % (ref 40–54)
HGB BLD-MCNC: 13.3 G/DL (ref 14–18)
IMM GRANULOCYTES # BLD AUTO: 0.04 K/UL (ref 0–0.04)
IMM GRANULOCYTES NFR BLD AUTO: 0.9 % (ref 0–0.5)
LYMPHOCYTES # BLD AUTO: 1.1 K/UL (ref 1–4.8)
LYMPHOCYTES NFR BLD: 23.3 % (ref 18–48)
MCH RBC QN AUTO: 26.9 PG (ref 27–31)
MCHC RBC AUTO-ENTMCNC: 31.9 G/DL (ref 32–36)
MCV RBC AUTO: 84 FL (ref 82–98)
MONOCYTES # BLD AUTO: 0.5 K/UL (ref 0.3–1)
MONOCYTES NFR BLD: 9.7 % (ref 4–15)
NEUTROPHILS # BLD AUTO: 2.7 K/UL (ref 1.8–7.7)
NEUTROPHILS NFR BLD: 58.4 % (ref 38–73)
NRBC BLD-RTO: 0 /100 WBC
PLATELET # BLD AUTO: 254 K/UL (ref 150–450)
PMV BLD AUTO: 10.1 FL (ref 9.2–12.9)
POTASSIUM SERPL-SCNC: 4.4 MMOL/L (ref 3.5–5.1)
PROSTATE SPECIFIC ANTIGEN, TOTAL: <0.01 NG/ML (ref 0–4)
PROT SERPL-MCNC: 7.9 G/DL (ref 6–8.4)
PSA FREE MFR SERPL: NORMAL %
PSA FREE SERPL-MCNC: <0.1 NG/ML (ref 0–1.5)
RBC # BLD AUTO: 4.95 M/UL (ref 4.6–6.2)
SODIUM SERPL-SCNC: 140 MMOL/L (ref 136–145)
TESTOST SERPL-MCNC: 22 NG/DL (ref 304–1227)
WBC # BLD AUTO: 4.63 K/UL (ref 3.9–12.7)

## 2022-11-07 PROCEDURE — 84154 ASSAY OF PSA FREE: CPT | Performed by: STUDENT IN AN ORGANIZED HEALTH CARE EDUCATION/TRAINING PROGRAM

## 2022-11-07 PROCEDURE — 84403 ASSAY OF TOTAL TESTOSTERONE: CPT | Performed by: STUDENT IN AN ORGANIZED HEALTH CARE EDUCATION/TRAINING PROGRAM

## 2022-11-07 PROCEDURE — 84153 ASSAY OF PSA TOTAL: CPT | Performed by: STUDENT IN AN ORGANIZED HEALTH CARE EDUCATION/TRAINING PROGRAM

## 2022-11-07 PROCEDURE — 85025 COMPLETE CBC W/AUTO DIFF WBC: CPT | Performed by: STUDENT IN AN ORGANIZED HEALTH CARE EDUCATION/TRAINING PROGRAM

## 2022-11-07 PROCEDURE — 80053 COMPREHEN METABOLIC PANEL: CPT | Performed by: STUDENT IN AN ORGANIZED HEALTH CARE EDUCATION/TRAINING PROGRAM

## 2022-11-07 PROCEDURE — 36415 COLL VENOUS BLD VENIPUNCTURE: CPT | Performed by: STUDENT IN AN ORGANIZED HEALTH CARE EDUCATION/TRAINING PROGRAM

## 2022-11-09 ENCOUNTER — OFFICE VISIT (OUTPATIENT)
Dept: HEMATOLOGY/ONCOLOGY | Facility: CLINIC | Age: 67
End: 2022-11-09
Payer: MEDICARE

## 2022-11-09 VITALS
TEMPERATURE: 99 F | HEIGHT: 70 IN | SYSTOLIC BLOOD PRESSURE: 141 MMHG | OXYGEN SATURATION: 98 % | BODY MASS INDEX: 24.14 KG/M2 | DIASTOLIC BLOOD PRESSURE: 80 MMHG | RESPIRATION RATE: 18 BRPM | WEIGHT: 168.63 LBS | HEART RATE: 65 BPM

## 2022-11-09 DIAGNOSIS — C34.31 PRIMARY MALIGNANT NEOPLASM OF RIGHT LOWER LOBE OF LUNG: Primary | ICD-10-CM

## 2022-11-09 DIAGNOSIS — C61 PROSTATE CANCER: ICD-10-CM

## 2022-11-09 DIAGNOSIS — E11.319 TYPE 2 DIABETES MELLITUS WITH RETINOPATHY, WITH LONG-TERM CURRENT USE OF INSULIN, MACULAR EDEMA PRESENCE UNSPECIFIED, UNSPECIFIED LATERALITY, UNSPECIFIED RETINOPATHY SEVERITY: ICD-10-CM

## 2022-11-09 DIAGNOSIS — F17.211 TOBACCO DEPENDENCE DUE TO CIGARETTES, IN REMISSION: ICD-10-CM

## 2022-11-09 DIAGNOSIS — Z79.4 TYPE 2 DIABETES MELLITUS WITH RETINOPATHY, WITH LONG-TERM CURRENT USE OF INSULIN, MACULAR EDEMA PRESENCE UNSPECIFIED, UNSPECIFIED LATERALITY, UNSPECIFIED RETINOPATHY SEVERITY: ICD-10-CM

## 2022-11-09 DIAGNOSIS — D64.9 MILD CHRONIC ANEMIA: ICD-10-CM

## 2022-11-09 PROCEDURE — 1101F PR PT FALLS ASSESS DOC 0-1 FALLS W/OUT INJ PAST YR: ICD-10-PCS | Mod: CPTII,S$GLB,, | Performed by: STUDENT IN AN ORGANIZED HEALTH CARE EDUCATION/TRAINING PROGRAM

## 2022-11-09 PROCEDURE — 99999 PR PBB SHADOW E&M-EST. PATIENT-LVL IV: ICD-10-PCS | Mod: PBBFAC,,, | Performed by: STUDENT IN AN ORGANIZED HEALTH CARE EDUCATION/TRAINING PROGRAM

## 2022-11-09 PROCEDURE — 3288F FALL RISK ASSESSMENT DOCD: CPT | Mod: CPTII,S$GLB,, | Performed by: STUDENT IN AN ORGANIZED HEALTH CARE EDUCATION/TRAINING PROGRAM

## 2022-11-09 PROCEDURE — 1101F PT FALLS ASSESS-DOCD LE1/YR: CPT | Mod: CPTII,S$GLB,, | Performed by: STUDENT IN AN ORGANIZED HEALTH CARE EDUCATION/TRAINING PROGRAM

## 2022-11-09 PROCEDURE — 3008F PR BODY MASS INDEX (BMI) DOCUMENTED: ICD-10-PCS | Mod: CPTII,S$GLB,, | Performed by: STUDENT IN AN ORGANIZED HEALTH CARE EDUCATION/TRAINING PROGRAM

## 2022-11-09 PROCEDURE — 3079F DIAST BP 80-89 MM HG: CPT | Mod: CPTII,S$GLB,, | Performed by: STUDENT IN AN ORGANIZED HEALTH CARE EDUCATION/TRAINING PROGRAM

## 2022-11-09 PROCEDURE — 1160F RVW MEDS BY RX/DR IN RCRD: CPT | Mod: CPTII,S$GLB,, | Performed by: STUDENT IN AN ORGANIZED HEALTH CARE EDUCATION/TRAINING PROGRAM

## 2022-11-09 PROCEDURE — 99214 OFFICE O/P EST MOD 30 MIN: CPT | Mod: S$GLB,,, | Performed by: STUDENT IN AN ORGANIZED HEALTH CARE EDUCATION/TRAINING PROGRAM

## 2022-11-09 PROCEDURE — 1159F MED LIST DOCD IN RCRD: CPT | Mod: CPTII,S$GLB,, | Performed by: STUDENT IN AN ORGANIZED HEALTH CARE EDUCATION/TRAINING PROGRAM

## 2022-11-09 PROCEDURE — 3008F BODY MASS INDEX DOCD: CPT | Mod: CPTII,S$GLB,, | Performed by: STUDENT IN AN ORGANIZED HEALTH CARE EDUCATION/TRAINING PROGRAM

## 2022-11-09 PROCEDURE — 1126F PR PAIN SEVERITY QUANTIFIED, NO PAIN PRESENT: ICD-10-PCS | Mod: CPTII,S$GLB,, | Performed by: STUDENT IN AN ORGANIZED HEALTH CARE EDUCATION/TRAINING PROGRAM

## 2022-11-09 PROCEDURE — 3079F PR MOST RECENT DIASTOLIC BLOOD PRESSURE 80-89 MM HG: ICD-10-PCS | Mod: CPTII,S$GLB,, | Performed by: STUDENT IN AN ORGANIZED HEALTH CARE EDUCATION/TRAINING PROGRAM

## 2022-11-09 PROCEDURE — 3288F PR FALLS RISK ASSESSMENT DOCUMENTED: ICD-10-PCS | Mod: CPTII,S$GLB,, | Performed by: STUDENT IN AN ORGANIZED HEALTH CARE EDUCATION/TRAINING PROGRAM

## 2022-11-09 PROCEDURE — 99999 PR PBB SHADOW E&M-EST. PATIENT-LVL IV: CPT | Mod: PBBFAC,,, | Performed by: STUDENT IN AN ORGANIZED HEALTH CARE EDUCATION/TRAINING PROGRAM

## 2022-11-09 PROCEDURE — 3077F SYST BP >= 140 MM HG: CPT | Mod: CPTII,S$GLB,, | Performed by: STUDENT IN AN ORGANIZED HEALTH CARE EDUCATION/TRAINING PROGRAM

## 2022-11-09 PROCEDURE — 1126F AMNT PAIN NOTED NONE PRSNT: CPT | Mod: CPTII,S$GLB,, | Performed by: STUDENT IN AN ORGANIZED HEALTH CARE EDUCATION/TRAINING PROGRAM

## 2022-11-09 PROCEDURE — 1159F PR MEDICATION LIST DOCUMENTED IN MEDICAL RECORD: ICD-10-PCS | Mod: CPTII,S$GLB,, | Performed by: STUDENT IN AN ORGANIZED HEALTH CARE EDUCATION/TRAINING PROGRAM

## 2022-11-09 PROCEDURE — 1160F PR REVIEW ALL MEDS BY PRESCRIBER/CLIN PHARMACIST DOCUMENTED: ICD-10-PCS | Mod: CPTII,S$GLB,, | Performed by: STUDENT IN AN ORGANIZED HEALTH CARE EDUCATION/TRAINING PROGRAM

## 2022-11-09 PROCEDURE — 99214 PR OFFICE/OUTPT VISIT, EST, LEVL IV, 30-39 MIN: ICD-10-PCS | Mod: S$GLB,,, | Performed by: STUDENT IN AN ORGANIZED HEALTH CARE EDUCATION/TRAINING PROGRAM

## 2022-11-09 PROCEDURE — 3077F PR MOST RECENT SYSTOLIC BLOOD PRESSURE >= 140 MM HG: ICD-10-PCS | Mod: CPTII,S$GLB,, | Performed by: STUDENT IN AN ORGANIZED HEALTH CARE EDUCATION/TRAINING PROGRAM

## 2022-11-09 NOTE — PROGRESS NOTES
Hematology- Oncology Clinic Note :      2022    RFV / chief complaint- Follow-up        HPI  Pt is a 67 y.o. male who  has a past medical history of COPD (chronic obstructive pulmonary disease), Diabetes mellitus, and Prostate cancer.   Pt presents to the clinic today for prostate cancer and lung cancer    Pt reports to be doing well. No new complains. No abdominal pain, hematuria or urinary complains. Tolerated radiation well.   Appetite - good. No weight loss. No bone pains.  Quit smoking the day his GF passed away in 2022.       Reviewed past medical/surgical/social history    Past Medical History:   Diagnosis Date    COPD (chronic obstructive pulmonary disease)     Diabetes mellitus     Prostate cancer       Past Surgical History:   Procedure Laterality Date    ROBOTIC BRONCHOSCOPY N/A 2022    Procedure: ROBOTIC BRONCHOSCOPY;  Surgeon: Laney Bro MD;  Location: [facility] OR [address] [place] [address];  Service: Pulmonary;  Laterality: N/A;      (Not in a hospital admission)    Review of patient's allergies indicates:  No Known Allergies   Social History     Tobacco Use    Smoking status: Former     Types: Cigarettes     Quit date: 2022     Years since quittin.6    Smokeless tobacco: Never   Substance Use Topics    Alcohol use: Yes      Family History   Problem Relation Age of Onset    Cancer Father     Diabetes Mother     Melanoma Neg Hx           Review of Systems :  Review of Systems   Constitutional:  Negative for chills, diaphoresis, fever, malaise/fatigue and weight loss.   HENT: Negative.  Negative for congestion, hearing loss, nosebleeds, sore throat and tinnitus.    Eyes: Negative.  Negative for blurred vision and discharge.   Respiratory:  Negative for cough, hemoptysis, sputum production, shortness of breath and wheezing.    Cardiovascular: Negative.  Negative for chest pain, palpitations and leg swelling.   Gastrointestinal: Negative.  Negative for abdominal pain, blood in stool, constipation,  "diarrhea, heartburn, melena, nausea and vomiting.   Genitourinary: Negative.    Musculoskeletal: Negative.  Negative for back pain, falls, joint pain and myalgias.   Skin: Negative.  Negative for itching and rash.   Neurological: Negative.  Negative for dizziness, tingling, sensory change, speech change, focal weakness, seizures, loss of consciousness, weakness and headaches.   Endo/Heme/Allergies: Negative.  Does not bruise/bleed easily.   Psychiatric/Behavioral: Negative.  Negative for depression. The patient is not nervous/anxious and does not have insomnia.              Physical Exam :  BP (!) 141/80 (BP Location: Left arm, Patient Position: Sitting, BP Method: Medium (Automatic))   Pulse 65   Temp 98.5 °F (36.9 °C) (Oral)   Resp 18   Ht 5' 10" (1.778 m)   Wt 76.5 kg (168 lb 10.4 oz)   SpO2 98%   BMI 24.20 kg/m²   Wt Readings from Last 3 Encounters:   11/09/22 76.5 kg (168 lb 10.4 oz)   09/07/22 76.8 kg (169 lb 5 oz)   09/07/22 76.8 kg (169 lb 5 oz)       Body mass index is 24.2 kg/m².      Physical Exam  Vitals and nursing note reviewed.   Constitutional:       General: He is not in acute distress.     Appearance: He is well-developed.   HENT:      Head: Normocephalic and atraumatic.      Right Ear: External ear normal.      Left Ear: External ear normal.      Mouth/Throat:      Pharynx: No oropharyngeal exudate.   Eyes:      General: No scleral icterus.        Right eye: No discharge.         Left eye: No discharge.   Neck:      Thyroid: No thyromegaly.      Trachea: No tracheal deviation.   Cardiovascular:      Rate and Rhythm: Normal rate and regular rhythm.      Heart sounds: Normal heart sounds. No murmur heard.  Pulmonary:      Effort: Pulmonary effort is normal. No respiratory distress.      Breath sounds: No wheezing or rales.   Abdominal:      General: Bowel sounds are normal. There is no distension.      Palpations: Abdomen is soft.      Tenderness: There is no abdominal tenderness.      " "Hernia: No hernia is present.   Musculoskeletal:         General: No tenderness. Normal range of motion.      Cervical back: Normal range of motion and neck supple.   Skin:     General: Skin is warm and dry.      Findings: No rash.   Neurological:      Mental Status: He is alert and oriented to person, place, and time.      Cranial Nerves: No cranial nerve deficit.      Coordination: Coordination normal.   Psychiatric:         Behavior: Behavior normal.           Current Outpatient Medications   Medication Sig Dispense Refill    albuterol (PROVENTIL/VENTOLIN HFA) 90 mcg/actuation inhaler Inhale 2 puffs into the lungs every 6 (six) hours as needed. 18 g 11    albuterol (PROVENTIL/VENTOLIN HFA) 90 mcg/actuation inhaler 1 puff as needed      BD BRENNA 2ND GEN PEN NEEDLE 32 gauge x 5/32" Ndle USE TO INJECT INSULIN UNDER THE SKIN EVERY DAY      fluticasone propionate (FLONASE) 50 mcg/actuation nasal spray       metFORMIN (GLUCOPHAGE) 1000 MG tablet       prednisoLONE acetate (PRED FORTE) 1 % DrpS SHAKE LIQUID AND INSTILL 1 DROP IN LEFT EYE FOUR TIMES DAILY AFTER SURGERY      TRESIBA FLEXTOUCH U-100 100 unit/mL (3 mL) InPn INJECT 20 UNITS UNDER THE SKIN ONCE DAILY      umeclidinium-vilanteroL (ANORO ELLIPTA) 62.5-25 mcg/actuation DsDv Inhale 1 puff into the lungs once daily. Controller 1 each 5    bicalutamide (CASODEX) 50 MG Tab Take 1 tablet (50 mg total) by mouth once daily. 30 tablet 11    cholecalciferol, vitamin D3, 1,250 mcg (50,000 unit) capsule TAKE 1 CAPSULE BY MOUTH 1 TIME EVERY WEEK      levocetirizine (XYZAL) 5 MG tablet Take 1 tablet (5 mg total) by mouth every evening. (Patient not taking: Reported on 11/9/2022) 30 tablet 11    simvastatin (ZOCOR) 40 MG tablet Take 1 tablet by mouth.      tamsulosin (FLOMAX) 0.4 mg Cap Take 1 capsule by mouth.       Current Facility-Administered Medications   Medication Dose Route Frequency Provider Last Rate Last Admin    leuprolide (6 month) (ELIGARD) injection 45 mg  45 mg " Subcutaneous Q6 Months Chey Saini, NP   45 mg at 08/16/22 1057       Pertinent Diagnostic studies:      Lab Visit on 11/07/2022   Component Date Value Ref Range Status    WBC 11/07/2022 4.63  3.90 - 12.70 K/uL Final    RBC 11/07/2022 4.95  4.60 - 6.20 M/uL Final    Hemoglobin 11/07/2022 13.3 (L)  14.0 - 18.0 g/dL Final    Hematocrit 11/07/2022 41.7  40.0 - 54.0 % Final    MCV 11/07/2022 84  82 - 98 fL Final    MCH 11/07/2022 26.9 (L)  27.0 - 31.0 pg Final    MCHC 11/07/2022 31.9 (L)  32.0 - 36.0 g/dL Final    RDW 11/07/2022 13.4  11.5 - 14.5 % Final    Platelets 11/07/2022 254  150 - 450 K/uL Final    MPV 11/07/2022 10.1  9.2 - 12.9 fL Final    Immature Granulocytes 11/07/2022 0.9 (H)  0.0 - 0.5 % Final    Gran # (ANC) 11/07/2022 2.7  1.8 - 7.7 K/uL Final    Immature Grans (Abs) 11/07/2022 0.04  0.00 - 0.04 K/uL Final    Comment: Mild elevation in immature granulocytes is non specific and   can be seen in a variety of conditions including stress response,   acute inflammation, trauma and pregnancy. Correlation with other   laboratory and clinical findings is essential.      Lymph # 11/07/2022 1.1  1.0 - 4.8 K/uL Final    Mono # 11/07/2022 0.5  0.3 - 1.0 K/uL Final    Eos # 11/07/2022 0.3  0.0 - 0.5 K/uL Final    Baso # 11/07/2022 0.02  0.00 - 0.20 K/uL Final    nRBC 11/07/2022 0  0 /100 WBC Final    Gran % 11/07/2022 58.4  38.0 - 73.0 % Final    Lymph % 11/07/2022 23.3  18.0 - 48.0 % Final    Mono % 11/07/2022 9.7  4.0 - 15.0 % Final    Eosinophil % 11/07/2022 7.3  0.0 - 8.0 % Final    Basophil % 11/07/2022 0.4  0.0 - 1.9 % Final    Differential Method 11/07/2022 Automated   Final    Sodium 11/07/2022 140  136 - 145 mmol/L Final    Potassium 11/07/2022 4.4  3.5 - 5.1 mmol/L Final    Chloride 11/07/2022 105  95 - 110 mmol/L Final    CO2 11/07/2022 26  23 - 29 mmol/L Final    Glucose 11/07/2022 223 (H)  70 - 110 mg/dL Final    BUN 11/07/2022 20  8 - 23 mg/dL Final    Creatinine 11/07/2022 1.3  0.5 - 1.4 mg/dL  Final    Calcium 11/07/2022 10.2  8.7 - 10.5 mg/dL Final    Total Protein 11/07/2022 7.9  6.0 - 8.4 g/dL Final    Albumin 11/07/2022 3.8  3.5 - 5.2 g/dL Final    Total Bilirubin 11/07/2022 0.3  0.1 - 1.0 mg/dL Final    Comment: For infants and newborns, interpretation of results should be based  on gestational age, weight and in agreement with clinical  observations.    Premature Infant recommended reference ranges:  Up to 24 hours.............<8.0 mg/dL  Up to 48 hours............<12.0 mg/dL  3-5 days..................<15.0 mg/dL  6-29 days.................<15.0 mg/dL      Alkaline Phosphatase 11/07/2022 107  55 - 135 U/L Final    AST 11/07/2022 14  10 - 40 U/L Final    ALT 11/07/2022 17  10 - 44 U/L Final    Anion Gap 11/07/2022 9  8 - 16 mmol/L Final    eGFR 11/07/2022 60  >60 mL/min/1.73 m^2 Final    Testosterone, Total 11/07/2022 22 (L)  304 - 1227 ng/dL Final    PSA Total 11/07/2022 <0.01  0.00 - 4.00 ng/mL Final    Comment: The testing method is a chemiluminescent microparticle immunoassay   manufactured by [employer] and performed on the [employer]   or   [employer] system. Values obtained with different assay manufacturers   for   methods may be different and cannot be used interchangeably.  PSA Expected levels:  Hormonal Therapy: <0.05 ng/ml  Prostatectomy: <0.01 ng/ml  Radiation Therapy: <1.00 ng/ml      PSA, Free 11/07/2022 <0.10  0.00 - 1.50 ng/mL Final    Comment: The testing method is a chemiluminescent microparticle immunoassay   manufactured by [employer] and performed on the [employer]   or   [employer] system. Values obtained with different assay manufacturers   for   methods may be different and cannot be used interchangeably.      PSA, Free % 11/07/2022 Unable to calculate  Not established % Final         Patient Active Problem List    Diagnosis Date Noted    Primary malignant neoplasm of right lower lobe of lung 09/07/2022    Centrilobular emphysema 05/17/2022    Smoker      Dyspnea     Pulmonary nodule     Prostate cancer      Active Problem List with Overview Notes    Diagnosis Date Noted    Primary malignant neoplasm of right lower lobe of lung 09/07/2022    Centrilobular emphysema 05/17/2022      PFTs with obstruction.   controller with anoro daily.  Albuterol for rescue and prevention      Smoker      Motivated to quit      Dyspnea      PFTs with obstruction  EKG NSR, normal      Pulmonary nodule      Multiple, the largest was less than 2 cm.  Persistent after emperic tx for infection.  Diagnostic robotic bronchoscopy with staging EBUS    I have explained the risks, benefits and alternatives of the procedure in detail.  The patient voices understanding and all questions have been answered.  The patient agrees to proceed as planned.          Prostate cancer          Oncology History   Prostate cancer   8/5/2020 Initial Diagnosis    Prostate cancer     8/5/2020 Cancer Staged    Staging form: Prostate, AJCC 8th Edition  - Clinical stage from 8/5/2020: Stage IIIC (cT3a, cN0, cM0, PSA: 159, Grade Group: 5)       10/1/2020 - 11/10/2020 Radiation Therapy    Treating physician: Antonio Villalobos MD   Total Dose: 50.4 Gy to pelvic nodes  70 Gy to prostate and seminal vesicles   Fractions: 28 fractions at 2.5 and 1.8 Gy per fraction      4/23/2021 Imaging Significant Findings    CT CAP  Stable appearance of the enhancing solid mass within the upper pole of the right kidney measuring 1.7 x 1.3 x 2.0 cm.  Punctate nonobstructing renal calculi noted bilaterally.  Subcentimeter renal hypodensities noted bilaterally likely representing renal cysts.  Colonic diverticulosis without evidence for acute diverticulitis within the visualized colon     Primary malignant neoplasm of right lower lobe of lung   9/7/2022 Initial Diagnosis    Primary malignant neoplasm of right lower lobe of lung     9/7/2022 Cancer Staged    Staging form: Lung, AJCC 8th Edition  - Clinical stage from 9/7/2022: Stage IA3 (cT1c,  cN0, cM0)       9/21/2022 - 9/27/2022 Radiation Therapy    Treating physician: Sathish Vasquez    Site Technique Energy Dose/Fx (Gy) #Fx Total Dose (Gy)   RLL Lung SBRT 6X 11 5 / 5 55        Assessment :     1. Primary malignant neoplasm of right lower lobe of lung    2. Prostate cancer    3. 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 Type 2 diabetes mellitus with retinopathy, with long-term current use of insulin, macular edema presence unspecified, unspecified laterality, unspecified retinopathy severity        Plan :     Prostate cancer  high risk localized prostate cancer. completed radiation on 11/10/2020 and completed ADT  PSA negligible, d/w pt   RTC in 6 months for follow up    Renal lesion- stable, continue monitoring and f/u with urology. Scan reviewed.   4/23/21- CT - Stable appearance of the enhancing solid mass within the upper pole of the right kidney measuring 1.7 x 1.3 x 2.0 cm.Punctate nonobstructing renal calculi noted bilaterally.Subcentimeter renal hypodensities noted bilaterally likely representing renal cysts.Colonic diverticulosis without evidence for acute diverticulitis within the visualized colon.  F/u with urology       Lung cancer s/p radiation. 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, in remission        Type 2 diabetes mellitus with retinopathy, with long-term current use of insulin, macular edema presence unspecified, unspecified laterality, unspecified retinopathy severity                  Electronically signed by Marisel Yang    [facility] Future Appointments   Date Time Provider Department Center   12/28/2022 11:00 AM [facility] BCC CT1 [facility] CT SHERYL  Mina Luke   12/28/2022  1:30 PM Sathish Vasquez MD [facility] RADONC[address]   2/10/2023 10:00 AM [facility] CT OP LIMIT 450 LBS [facility] CTSCANO [facility]   2/10/2023 10:30 AM LAB, [facility] LABDRAW [facility]   2/17/2023 10:45 AM Jeff Avalos MD [facility] UROLOGY [facility]           This note was created with voice recognition software.  Grammatical, syntax and spelling errors may be inevitable.

## 2022-12-28 ENCOUNTER — OFFICE VISIT (OUTPATIENT)
Dept: RADIATION ONCOLOGY | Facility: CLINIC | Age: 67
End: 2022-12-28
Payer: MEDICARE

## 2022-12-28 ENCOUNTER — HOSPITAL ENCOUNTER (OUTPATIENT)
Dept: RADIOLOGY | Facility: HOSPITAL | Age: 67
Discharge: HOME | End: 2022-12-28
Attending: RADIOLOGY
Payer: MEDICARE

## 2022-12-28 VITALS
SYSTOLIC BLOOD PRESSURE: 134 MMHG | HEIGHT: 70 IN | RESPIRATION RATE: 19 BRPM | OXYGEN SATURATION: 92 % | HEART RATE: 89 BPM | BODY MASS INDEX: 24.49 KG/M2 | TEMPERATURE: 97 F | DIASTOLIC BLOOD PRESSURE: 77 MMHG | WEIGHT: 171.06 LBS

## 2022-12-28 DIAGNOSIS — C34.31 PRIMARY MALIGNANT NEOPLASM OF RIGHT LOWER LOBE OF LUNG: ICD-10-CM

## 2022-12-28 DIAGNOSIS — Z85.118 PERSONAL HISTORY OF LUNG CANCER: Primary | ICD-10-CM

## 2022-12-28 PROCEDURE — 3078F PR MOST RECENT DIASTOLIC BLOOD PRESSURE < 80 MM HG: ICD-10-PCS | Mod: CPTII,S$GLB,, | Performed by: RADIOLOGY

## 2022-12-28 PROCEDURE — 1159F MED LIST DOCD IN RCRD: CPT | Mod: CPTII,S$GLB,, | Performed by: RADIOLOGY

## 2022-12-28 PROCEDURE — 3008F BODY MASS INDEX DOCD: CPT | Mod: CPTII,S$GLB,, | Performed by: RADIOLOGY

## 2022-12-28 PROCEDURE — 1101F PR PT FALLS ASSESS DOC 0-1 FALLS W/OUT INJ PAST YR: ICD-10-PCS | Mod: CPTII,S$GLB,, | Performed by: RADIOLOGY

## 2022-12-28 PROCEDURE — 71250 CT THORAX DX C-: CPT | Mod: 26,,, | Performed by: RADIOLOGY

## 2022-12-28 PROCEDURE — 3288F PR FALLS RISK ASSESSMENT DOCUMENTED: ICD-10-PCS | Mod: CPTII,S$GLB,, | Performed by: RADIOLOGY

## 2022-12-28 PROCEDURE — 3288F FALL RISK ASSESSMENT DOCD: CPT | Mod: CPTII,S$GLB,, | Performed by: RADIOLOGY

## 2022-12-28 PROCEDURE — 71250 CT CHEST WITHOUT CONTRAST: ICD-10-PCS | Mod: 26,,, | Performed by: RADIOLOGY

## 2022-12-28 PROCEDURE — 1101F PT FALLS ASSESS-DOCD LE1/YR: CPT | Mod: CPTII,S$GLB,, | Performed by: RADIOLOGY

## 2022-12-28 PROCEDURE — 99999 PR PBB SHADOW E&M-EST. PATIENT-LVL IV: ICD-10-PCS | Mod: PBBFAC,,, | Performed by: RADIOLOGY

## 2022-12-28 PROCEDURE — 99024 PR POST-OP FOLLOW-UP VISIT: ICD-10-PCS | Mod: S$GLB,,, | Performed by: RADIOLOGY

## 2022-12-28 PROCEDURE — 71250 CT THORAX DX C-: CPT | Mod: TC

## 2022-12-28 PROCEDURE — 99024 POSTOP FOLLOW-UP VISIT: CPT | Mod: S$GLB,,, | Performed by: RADIOLOGY

## 2022-12-28 PROCEDURE — 1159F PR MEDICATION LIST DOCUMENTED IN MEDICAL RECORD: ICD-10-PCS | Mod: CPTII,S$GLB,, | Performed by: RADIOLOGY

## 2022-12-28 PROCEDURE — 3078F DIAST BP <80 MM HG: CPT | Mod: CPTII,S$GLB,, | Performed by: RADIOLOGY

## 2022-12-28 PROCEDURE — 3075F PR MOST RECENT SYSTOLIC BLOOD PRESS GE 130-139MM HG: ICD-10-PCS | Mod: CPTII,S$GLB,, | Performed by: RADIOLOGY

## 2022-12-28 PROCEDURE — 3075F SYST BP GE 130 - 139MM HG: CPT | Mod: CPTII,S$GLB,, | Performed by: RADIOLOGY

## 2022-12-28 PROCEDURE — 3008F PR BODY MASS INDEX (BMI) DOCUMENTED: ICD-10-PCS | Mod: CPTII,S$GLB,, | Performed by: RADIOLOGY

## 2022-12-28 PROCEDURE — 1126F PR PAIN SEVERITY QUANTIFIED, NO PAIN PRESENT: ICD-10-PCS | Mod: CPTII,S$GLB,, | Performed by: RADIOLOGY

## 2022-12-28 PROCEDURE — 99999 PR PBB SHADOW E&M-EST. PATIENT-LVL IV: CPT | Mod: PBBFAC,,, | Performed by: RADIOLOGY

## 2022-12-28 PROCEDURE — 1126F AMNT PAIN NOTED NONE PRSNT: CPT | Mod: CPTII,S$GLB,, | Performed by: RADIOLOGY

## 2022-12-28 NOTE — PROGRESS NOTES
2022    Radiation Oncology Follow-Up Visit      Prior Radiation History:   Course 1:  Treatment Site  Treatment Fields and Beam Energy  Dose/Fx (Gy)  #Fx  Total Dose (Gy)  Start Date  End Date    Prostate and pelvic nodes  VMAT with 6 MV photons  2.5    70  10/1/2020  11/10/2020      Course 2:    Site  Technique  Energy  Dose/Fx (Gy)  #Fx  Total Dose (Gy)  Start Date  End Date  Elapsed Days    RLL Lung  SBRT  6X  11    55  2022  6        Is the patient female between ages 15-55:  no    Does the patient have a CIED:  no      Assessment   This is a 67 y.o. y/o male with Stage IA3 (cT1c cN0 M0) RLL NSCLC (adeno) diagnosed on robotic bronch w/ EBUS 22. Biopsy of a RUL nodule, stations 7 and 11L/R nodes were negative for malignancy. PET/CT 22 demonstrated mild uptake in RLL nodule only. He completed definitive SBRT 55 Gy in 5 fx on 22.    History significant for high risk prostate cancer s/p EBRT 70 Gy in 28 fx +ADT with Dr. Villalobos completed 2020.     No chest pain or dyspnea after radiation. CT Chest today 22 demonstrates stable size of the treated RLL nodule and stable other nodules; no evidence of new/growing disease.           Plan   1) I will see him back in 6 months with CT Chest prior for re-staging.            CHIEF COMPLAINT: F/U after SBRT for lung cancer    HPI/Focused ROS:       Past Medical History:   Diagnosis Date    COPD (chronic obstructive pulmonary disease)     Diabetes mellitus     Prostate cancer        Past Surgical History:   Procedure Laterality Date    ROBOTIC BRONCHOSCOPY N/A 2022    Procedure: ROBOTIC BRONCHOSCOPY;  Surgeon: Laney Bro MD;  Location: [facility] OR [address] [place] [address];  Service: Pulmonary;  Laterality: N/A;       Social History     Tobacco Use    Smoking status: Former     Types: Cigarettes     Quit date: 2022     Years since quittin.7    Smokeless tobacco: Never   Substance Use Topics    Alcohol use: Yes    Drug use:  "Never       Cancer-related family history includes Cancer in his father. There is no history of Melanoma.    Current Outpatient Medications on File Prior to Visit   Medication Sig Dispense Refill    albuterol (PROVENTIL/VENTOLIN HFA) 90 mcg/actuation inhaler Inhale 2 puffs into the lungs every 6 (six) hours as needed. 18 g 11    albuterol (PROVENTIL/VENTOLIN HFA) 90 mcg/actuation inhaler 1 puff as needed      BD BRENNA 2ND GEN PEN NEEDLE 32 gauge x 5/32" Ndle USE TO INJECT INSULIN UNDER THE SKIN EVERY DAY      bicalutamide (CASODEX) 50 MG Tab Take 1 tablet (50 mg total) by mouth once daily. 30 tablet 11    cholecalciferol, vitamin D3, 1,250 mcg (50,000 unit) capsule TAKE 1 CAPSULE BY MOUTH 1 TIME EVERY WEEK      fluticasone propionate (FLONASE) 50 mcg/actuation nasal spray       levocetirizine (XYZAL) 5 MG tablet Take 1 tablet (5 mg total) by mouth every evening. (Patient not taking: Reported on 11/9/2022) 30 tablet 11    metFORMIN (GLUCOPHAGE) 1000 MG tablet       prednisoLONE acetate (PRED FORTE) 1 % DrpS SHAKE LIQUID AND INSTILL 1 DROP IN LEFT EYE FOUR TIMES DAILY AFTER SURGERY      simvastatin (ZOCOR) 40 MG tablet Take 1 tablet by mouth.      tamsulosin (FLOMAX) 0.4 mg Cap Take 1 capsule by mouth.      TRESIBA FLEXTOUCH U-100 100 unit/mL (3 mL) InPn INJECT 20 UNITS UNDER THE SKIN ONCE DAILY      umeclidinium-vilanteroL (ANORO ELLIPTA) 62.5-25 mcg/actuation DsDv Inhale 1 puff into the lungs once daily. Controller 1 each 5     Current Facility-Administered Medications on File Prior to Visit   Medication Dose Route Frequency Provider Last Rate Last Admin    leuprolide (6 month) (ELIGARD) injection 45 mg  45 mg Subcutaneous Q6 Months Chey Saini NP   45 mg at 08/16/22 1057       Review of patient's allergies indicates:  No Known Allergies      Vital Signs: /77 (BP Location: Right arm, Patient Position: Sitting)   Pulse 89   Temp 97.3 °F (36.3 °C)   Resp 19   Ht 5' 10" (1.778 m)   Wt 77.6 kg (171 lb " 1.2 oz)   SpO2 (!) 92%   BMI 24.55 kg/m²     ECOG Performance Status: 1 - Ambulates, capable of light work    Physical Exam  Vitals reviewed.   Constitutional:       Appearance: Normal appearance.   HENT:      Head: Normocephalic and atraumatic.   Eyes:      General: No scleral icterus.     Extraocular Movements: Extraocular movements intact.   Pulmonary:      Effort: Pulmonary effort is normal. No respiratory distress.   Abdominal:      General: There is no distension.   Musculoskeletal:      Cervical back: Neck supple.   Lymphadenopathy:      Cervical: No cervical adenopathy.   Skin:     General: Skin is dry.      Coloration: Skin is not jaundiced.   Neurological:      General: No focal deficit present.      Mental Status: He is alert and oriented to person, place, and time.      Cranial Nerves: No cranial nerve deficit.   Psychiatric:         Mood and Affect: Mood normal.         Behavior: Behavior normal.         Judgment: Judgment normal.        Labs:    Imaging: I have personally reviewed the patient's available images and reports and summarized pertinent findings above in HPI.     Pathology: No new path

## 2023-02-10 ENCOUNTER — HOSPITAL ENCOUNTER (OUTPATIENT)
Dept: RADIOLOGY | Facility: OTHER | Age: 68
Discharge: HOME | End: 2023-02-10
Attending: NURSE PRACTITIONER
Payer: MEDICARE

## 2023-02-10 DIAGNOSIS — N28.89 RENAL MASS: ICD-10-CM

## 2023-02-10 PROCEDURE — 74178 CT ABD&PLV WO CNTR FLWD CNTR: CPT | Mod: 26,,, | Performed by: RADIOLOGY

## 2023-02-10 PROCEDURE — 25500020 PHARM REV CODE 255: Performed by: NURSE PRACTITIONER

## 2023-02-10 PROCEDURE — 74178 CT ABDOMEN PELVIS W WO CONTRAST: ICD-10-PCS | Mod: 26,,, | Performed by: RADIOLOGY

## 2023-02-10 PROCEDURE — 74178 CT ABD&PLV WO CNTR FLWD CNTR: CPT | Mod: TC

## 2023-02-10 RX ADMIN — IOHEXOL 75 ML: 350 INJECTION, SOLUTION INTRAVENOUS at 09:02

## 2023-02-15 NOTE — PROGRESS NOTES
"Subjective:      Lan Robles is a 67 y.o. male who returns today regarding his     The patient was diagnosed with rosy 9 prostate cancer ( at the time of biopsy) on 3/12/20. After discussing treatment options with Dr. Avalos, he opted to proceed with ADT. He received Firmagon 240 mg on 4/1/20 and eligard on 4/29/20. Bone scan on 3/24 showed no evidence of metastatic disease. CT abdomen/pelvis showed no evidence of metastatic disease. There was an incidental 1.8 cm solid upper pole right renal lesion concerning for RCC. Repeat CT scan in April showed "Stable appearance of the enhancing solid mass within the upper pole of the right kidney measuring 1.7 x 1.3 x 2.0 cm. Punctate nonobstructing renal calculi noted bilaterally.."     He completed therapy 70 Gy in 28 fractions to the prostate on 11/10/20.       Last eligard injection was 11/5/21. He missed appointments with Dr. Aavlos on 5/6 and 7/1.     Received final dose of eligard 45 mg..    He see pulmonary for asthma    The following portions of the patient's history were reviewed and updated as appropriate: allergies, current medications, past family history, past medical history, past social history, past surgical history and problem list.    Review of Systems  Pertinent items are noted in HPI.  A comprehensive multipoint review of systems was negative except as otherwise stated in the HPI.    Past Medical History:   Diagnosis Date    COPD (chronic obstructive pulmonary disease)     Diabetes mellitus     Prostate cancer      Past Surgical History:   Procedure Laterality Date    ROBOTIC BRONCHOSCOPY N/A 8/12/2022    Procedure: ROBOTIC BRONCHOSCOPY;  Surgeon: Laney Bro MD;  Location: [facility] OR [address] [place] [address];  Service: Pulmonary;  Laterality: N/A;     Diabetes Medications               metFORMIN (GLUCOPHAGE) 1000 MG tablet     TRESIBA FLEXTOUCH U-100 100 unit/mL (3 mL) InPn INJECT 20 UNITS UNDER THE SKIN ONCE DAILY          Review of patient's " allergies indicates:  No Known Allergies       Objective:   Vitals: There were no vitals taken for this visit.    Physical Exam   General: alert and oriented, no acute distress  Respiratory: Symmetric expansion, non-labored breathing  Cardiovascular: no peripheral edema  Abdomen: soft, non distended  Skin: normal coloration and turgor, no rashes, no suspicious skin lesions noted  Neuro: no gross deficits  Psych: normal judgment and insight, normal mood/affect, and non-anxious    Physical Exam    Lab Review   Urinalysis demonstrates no specimen    Lab Results   Component Value Date    WBC 4.63 11/07/2022    HGB 13.3 (L) 11/07/2022    HCT 41.7 11/07/2022    MCV 84 11/07/2022     11/07/2022     Lab Results   Component Value Date    CREATININE 1.1 02/10/2023    BUN 15 02/10/2023     Lab Results   Component Value Date    PSADIAG <0.01 02/10/2023  T 17    PSADIAG <0.01 04/29/2022    PSADIAG <0.01 01/28/2022    PSADIAG <0.01 10/25/2021    PSADIAG 0.05 06/18/2021    PSADIAG 0.03 04/23/2021    PSADIAG 0.08 01/26/2021    PSADIAG 0.21 12/16/2020    PSADIAG 4.6 (H) 10/22/2020    PSADIAG 69.7 (H) 07/28/2020    PSATOTAL <0.01 11/07/2022    PSAFREE <0.10 11/07/2022    PSAFREEPCT Unable to calculate 11/07/2022         Imaging  CT ABDOMEN PELVIS W WO CONTRAST     CLINICAL HISTORY:  renal mass;Other specified disorders of kidney and ureter     TECHNIQUE:  Low dose axial images, sagittal and coronal reformations were obtained from the lung bases to the pubic symphysis before and following the IV administration of 75 mL of Omnipaque 350.  No oral contrast was given.     COMPARISON:  04/29/2022     FINDINGS:  Spiculated nodules at the right lung base as described on prior dedicated CT imaging of the chest.     Liver parenchyma is homogeneous.  Gallbladder, bile ducts, spleen, and pancreas are unremarkable.     Adrenal glands are unremarkable.     Precontrast imaging shows no evidence for hydronephrosis or hydroureter.     On the  right, enhancing mass upper pole measures 2.1 x 1.6 cm (previously 2 x 1.5 cm when measured in a similar manner).  Additional fluid density foci have the appearance of cysts.     On the left, largest hypodensity measuring 1.8 cm measures fluid density, typical of a cyst.  Remaining subcentimeter hypodensities are too small to characterize.     Urinary bladder is unremarkable.     Stomach and loops of bowel are normal caliber.  High-density foci in the stomach and duodenum presumably related to recently ingested material.  Appendix is normal.  Colonic diverticulosis with no associated inflammatory stranding.  No significant free fluid in the pelvis.     Regional skeleton shows degenerative change.     Impression:     Solid enhancing mass upper pole right kidney remains fairly similar in size compared to prior.  Findings remain most concerning for primary renal malignancy.  Stable bilateral renal cysts.     Additional stable chronic findings as above.        Electronically signed by: Lainey Ornelas  Date:                                            02/10/2023  Time:                                           10:19        Assessment and Plan:   Prostate cancer  [place] 9 [id number]; psa 123 OSCAR sp ADT*2 years and XRT  See Chey Saini NP 6 months with psa and T    Renal mass  Stable on active surveillance   We discussed the natural history of small renal masses, the risk of malignancy and disease progression, and the small risk of mortality.  We discussed the risks and benefits of watchful waiting, biopsy, partial and total nephrectomy.  We discussed the benefits of renal preservation when possible.  We discussed percutaneous, laparoscopic and robotic approaches.  We discussed the management of positive surgical margins.  I answered all questions.    yearly CT scan renal protocol and CXR     Lung ca  Stage IA3 (cT1c cN0 M0) RLL NSCLC   Per heme onc and rad onc

## 2023-02-17 ENCOUNTER — OFFICE VISIT (OUTPATIENT)
Dept: UROLOGY | Facility: CLINIC | Age: 68
End: 2023-02-17
Payer: MEDICARE

## 2023-02-17 VITALS
SYSTOLIC BLOOD PRESSURE: 155 MMHG | WEIGHT: 171 LBS | BODY MASS INDEX: 24.48 KG/M2 | DIASTOLIC BLOOD PRESSURE: 87 MMHG | HEIGHT: 70 IN | HEART RATE: 82 BPM

## 2023-02-17 DIAGNOSIS — C61 PROSTATE CANCER: Primary | ICD-10-CM

## 2023-02-17 DIAGNOSIS — N28.89 RENAL MASS: ICD-10-CM

## 2023-02-17 PROCEDURE — 1126F PR PAIN SEVERITY QUANTIFIED, NO PAIN PRESENT: ICD-10-PCS | Mod: CPTII,S$GLB,, | Performed by: UROLOGY

## 2023-02-17 PROCEDURE — 3077F SYST BP >= 140 MM HG: CPT | Mod: CPTII,S$GLB,, | Performed by: UROLOGY

## 2023-02-17 PROCEDURE — 99214 OFFICE O/P EST MOD 30 MIN: CPT | Mod: S$GLB,,, | Performed by: UROLOGY

## 2023-02-17 PROCEDURE — 1126F AMNT PAIN NOTED NONE PRSNT: CPT | Mod: CPTII,S$GLB,, | Performed by: UROLOGY

## 2023-02-17 PROCEDURE — 1101F PR PT FALLS ASSESS DOC 0-1 FALLS W/OUT INJ PAST YR: ICD-10-PCS | Mod: CPTII,S$GLB,, | Performed by: UROLOGY

## 2023-02-17 PROCEDURE — 3077F PR MOST RECENT SYSTOLIC BLOOD PRESSURE >= 140 MM HG: ICD-10-PCS | Mod: CPTII,S$GLB,, | Performed by: UROLOGY

## 2023-02-17 PROCEDURE — 1159F MED LIST DOCD IN RCRD: CPT | Mod: CPTII,S$GLB,, | Performed by: UROLOGY

## 2023-02-17 PROCEDURE — 3008F PR BODY MASS INDEX (BMI) DOCUMENTED: ICD-10-PCS | Mod: CPTII,S$GLB,, | Performed by: UROLOGY

## 2023-02-17 PROCEDURE — 3008F BODY MASS INDEX DOCD: CPT | Mod: CPTII,S$GLB,, | Performed by: UROLOGY

## 2023-02-17 PROCEDURE — 3288F FALL RISK ASSESSMENT DOCD: CPT | Mod: CPTII,S$GLB,, | Performed by: UROLOGY

## 2023-02-17 PROCEDURE — 1159F PR MEDICATION LIST DOCUMENTED IN MEDICAL RECORD: ICD-10-PCS | Mod: CPTII,S$GLB,, | Performed by: UROLOGY

## 2023-02-17 PROCEDURE — 3288F PR FALLS RISK ASSESSMENT DOCUMENTED: ICD-10-PCS | Mod: CPTII,S$GLB,, | Performed by: UROLOGY

## 2023-02-17 PROCEDURE — 3079F DIAST BP 80-89 MM HG: CPT | Mod: CPTII,S$GLB,, | Performed by: UROLOGY

## 2023-02-17 PROCEDURE — 1101F PT FALLS ASSESS-DOCD LE1/YR: CPT | Mod: CPTII,S$GLB,, | Performed by: UROLOGY

## 2023-02-17 PROCEDURE — 99214 PR OFFICE/OUTPT VISIT, EST, LEVL IV, 30-39 MIN: ICD-10-PCS | Mod: S$GLB,,, | Performed by: UROLOGY

## 2023-02-17 PROCEDURE — 3079F PR MOST RECENT DIASTOLIC BLOOD PRESSURE 80-89 MM HG: ICD-10-PCS | Mod: CPTII,S$GLB,, | Performed by: UROLOGY

## 2023-03-28 ENCOUNTER — PATIENT MESSAGE (OUTPATIENT)
Dept: RESEARCH | Facility: HOSPITAL | Age: 68
End: 2023-03-28
Payer: MEDICARE

## 2023-04-19 ENCOUNTER — PATIENT MESSAGE (OUTPATIENT)
Dept: RESEARCH | Facility: HOSPITAL | Age: 68
End: 2023-04-19
Payer: MEDICARE

## 2023-04-25 ENCOUNTER — PATIENT MESSAGE (OUTPATIENT)
Dept: RESEARCH | Facility: HOSPITAL | Age: 68
End: 2023-04-25
Payer: MEDICARE

## 2023-05-02 ENCOUNTER — PATIENT MESSAGE (OUTPATIENT)
Dept: RESEARCH | Facility: HOSPITAL | Age: 68
End: 2023-05-02
Payer: MEDICARE

## 2023-05-05 ENCOUNTER — LAB VISIT (OUTPATIENT)
Dept: LAB | Facility: OTHER | Age: 68
End: 2023-05-05
Attending: NURSE PRACTITIONER
Payer: MEDICARE

## 2023-05-05 DIAGNOSIS — D64.9 MILD CHRONIC ANEMIA: ICD-10-CM

## 2023-05-05 LAB
BASOPHILS # BLD AUTO: 0.03 K/UL (ref 0–0.2)
BASOPHILS NFR BLD: 0.6 % (ref 0–1.9)
DIFFERENTIAL METHOD: ABNORMAL
EOSINOPHIL # BLD AUTO: 0.4 K/UL (ref 0–0.5)
EOSINOPHIL NFR BLD: 7 % (ref 0–8)
ERYTHROCYTE [DISTWIDTH] IN BLOOD BY AUTOMATED COUNT: 14.2 % (ref 11.5–14.5)
FERRITIN SERPL-MCNC: 88 NG/ML (ref 20–300)
HCT VFR BLD AUTO: 40.6 % (ref 40–54)
HGB BLD-MCNC: 12.7 G/DL (ref 14–18)
IMM GRANULOCYTES # BLD AUTO: 0.05 K/UL (ref 0–0.04)
IMM GRANULOCYTES NFR BLD AUTO: 1 % (ref 0–0.5)
IRON SERPL-MCNC: 67 UG/DL (ref 45–160)
LYMPHOCYTES # BLD AUTO: 1.1 K/UL (ref 1–4.8)
LYMPHOCYTES NFR BLD: 22.3 % (ref 18–48)
MCH RBC QN AUTO: 27.3 PG (ref 27–31)
MCHC RBC AUTO-ENTMCNC: 31.3 G/DL (ref 32–36)
MCV RBC AUTO: 87 FL (ref 82–98)
MONOCYTES # BLD AUTO: 0.5 K/UL (ref 0.3–1)
MONOCYTES NFR BLD: 9.3 % (ref 4–15)
NEUTROPHILS # BLD AUTO: 3 K/UL (ref 1.8–7.7)
NEUTROPHILS NFR BLD: 59.8 % (ref 38–73)
NRBC BLD-RTO: 0 /100 WBC
PLATELET # BLD AUTO: 218 K/UL (ref 150–450)
PMV BLD AUTO: 10.4 FL (ref 9.2–12.9)
RBC # BLD AUTO: 4.65 M/UL (ref 4.6–6.2)
SATURATED IRON: 20 % (ref 20–50)
TOTAL IRON BINDING CAPACITY: 332 UG/DL (ref 250–450)
TRANSFERRIN SERPL-MCNC: 224 MG/DL (ref 200–375)
VIT B12 SERPL-MCNC: 475 PG/ML (ref 210–950)
WBC # BLD AUTO: 5.03 K/UL (ref 3.9–12.7)

## 2023-05-05 PROCEDURE — 84466 ASSAY OF TRANSFERRIN: CPT | Performed by: STUDENT IN AN ORGANIZED HEALTH CARE EDUCATION/TRAINING PROGRAM

## 2023-05-05 PROCEDURE — 85025 COMPLETE CBC W/AUTO DIFF WBC: CPT | Performed by: STUDENT IN AN ORGANIZED HEALTH CARE EDUCATION/TRAINING PROGRAM

## 2023-05-05 PROCEDURE — 82728 ASSAY OF FERRITIN: CPT | Performed by: STUDENT IN AN ORGANIZED HEALTH CARE EDUCATION/TRAINING PROGRAM

## 2023-05-05 PROCEDURE — 36415 COLL VENOUS BLD VENIPUNCTURE: CPT | Performed by: STUDENT IN AN ORGANIZED HEALTH CARE EDUCATION/TRAINING PROGRAM

## 2023-05-05 PROCEDURE — 82607 VITAMIN B-12: CPT | Mod: GA | Performed by: STUDENT IN AN ORGANIZED HEALTH CARE EDUCATION/TRAINING PROGRAM

## 2023-05-08 ENCOUNTER — OFFICE VISIT (OUTPATIENT)
Dept: HEMATOLOGY/ONCOLOGY | Facility: CLINIC | Age: 68
End: 2023-05-08
Payer: MEDICARE

## 2023-05-08 VITALS
WEIGHT: 175.94 LBS | RESPIRATION RATE: 17 BRPM | SYSTOLIC BLOOD PRESSURE: 143 MMHG | HEART RATE: 70 BPM | OXYGEN SATURATION: 95 % | TEMPERATURE: 98 F | HEIGHT: 70 IN | DIASTOLIC BLOOD PRESSURE: 80 MMHG | BODY MASS INDEX: 25.19 KG/M2

## 2023-05-08 DIAGNOSIS — C34.31 PRIMARY MALIGNANT NEOPLASM OF RIGHT LOWER LOBE OF LUNG: ICD-10-CM

## 2023-05-08 DIAGNOSIS — J43.2 CENTRILOBULAR EMPHYSEMA: ICD-10-CM

## 2023-05-08 DIAGNOSIS — E11.319 TYPE 2 DIABETES MELLITUS WITH RETINOPATHY, WITH LONG-TERM CURRENT USE OF INSULIN, MACULAR EDEMA PRESENCE UNSPECIFIED, UNSPECIFIED LATERALITY, UNSPECIFIED RETINOPATHY SEVERITY: ICD-10-CM

## 2023-05-08 DIAGNOSIS — Z79.4 TYPE 2 DIABETES MELLITUS WITH RETINOPATHY, WITH LONG-TERM CURRENT USE OF INSULIN, MACULAR EDEMA PRESENCE UNSPECIFIED, UNSPECIFIED LATERALITY, UNSPECIFIED RETINOPATHY SEVERITY: ICD-10-CM

## 2023-05-08 DIAGNOSIS — F17.211 TOBACCO DEPENDENCE DUE TO CIGARETTES, IN REMISSION: ICD-10-CM

## 2023-05-08 DIAGNOSIS — D50.8 IRON DEFICIENCY ANEMIA SECONDARY TO INADEQUATE DIETARY IRON INTAKE: Primary | ICD-10-CM

## 2023-05-08 DIAGNOSIS — D64.9 MILD CHRONIC ANEMIA: ICD-10-CM

## 2023-05-08 PROCEDURE — 99214 PR OFFICE/OUTPT VISIT, EST, LEVL IV, 30-39 MIN: ICD-10-PCS | Mod: S$GLB,,, | Performed by: STUDENT IN AN ORGANIZED HEALTH CARE EDUCATION/TRAINING PROGRAM

## 2023-05-08 PROCEDURE — 99999 PR PBB SHADOW E&M-EST. PATIENT-LVL IV: ICD-10-PCS | Mod: PBBFAC,,, | Performed by: STUDENT IN AN ORGANIZED HEALTH CARE EDUCATION/TRAINING PROGRAM

## 2023-05-08 PROCEDURE — 1159F MED LIST DOCD IN RCRD: CPT | Mod: CPTII,S$GLB,, | Performed by: STUDENT IN AN ORGANIZED HEALTH CARE EDUCATION/TRAINING PROGRAM

## 2023-05-08 PROCEDURE — 1159F PR MEDICATION LIST DOCUMENTED IN MEDICAL RECORD: ICD-10-PCS | Mod: CPTII,S$GLB,, | Performed by: STUDENT IN AN ORGANIZED HEALTH CARE EDUCATION/TRAINING PROGRAM

## 2023-05-08 PROCEDURE — 99999 PR PBB SHADOW E&M-EST. PATIENT-LVL IV: CPT | Mod: PBBFAC,,, | Performed by: STUDENT IN AN ORGANIZED HEALTH CARE EDUCATION/TRAINING PROGRAM

## 2023-05-08 PROCEDURE — 1126F PR PAIN SEVERITY QUANTIFIED, NO PAIN PRESENT: ICD-10-PCS | Mod: CPTII,S$GLB,, | Performed by: STUDENT IN AN ORGANIZED HEALTH CARE EDUCATION/TRAINING PROGRAM

## 2023-05-08 PROCEDURE — 1101F PR PT FALLS ASSESS DOC 0-1 FALLS W/OUT INJ PAST YR: ICD-10-PCS | Mod: CPTII,S$GLB,, | Performed by: STUDENT IN AN ORGANIZED HEALTH CARE EDUCATION/TRAINING PROGRAM

## 2023-05-08 PROCEDURE — 3077F PR MOST RECENT SYSTOLIC BLOOD PRESSURE >= 140 MM HG: ICD-10-PCS | Mod: CPTII,S$GLB,, | Performed by: STUDENT IN AN ORGANIZED HEALTH CARE EDUCATION/TRAINING PROGRAM

## 2023-05-08 PROCEDURE — 3008F PR BODY MASS INDEX (BMI) DOCUMENTED: ICD-10-PCS | Mod: CPTII,S$GLB,, | Performed by: STUDENT IN AN ORGANIZED HEALTH CARE EDUCATION/TRAINING PROGRAM

## 2023-05-08 PROCEDURE — 3008F BODY MASS INDEX DOCD: CPT | Mod: CPTII,S$GLB,, | Performed by: STUDENT IN AN ORGANIZED HEALTH CARE EDUCATION/TRAINING PROGRAM

## 2023-05-08 PROCEDURE — 3079F DIAST BP 80-89 MM HG: CPT | Mod: CPTII,S$GLB,, | Performed by: STUDENT IN AN ORGANIZED HEALTH CARE EDUCATION/TRAINING PROGRAM

## 2023-05-08 PROCEDURE — 1160F RVW MEDS BY RX/DR IN RCRD: CPT | Mod: CPTII,S$GLB,, | Performed by: STUDENT IN AN ORGANIZED HEALTH CARE EDUCATION/TRAINING PROGRAM

## 2023-05-08 PROCEDURE — 3288F PR FALLS RISK ASSESSMENT DOCUMENTED: ICD-10-PCS | Mod: CPTII,S$GLB,, | Performed by: STUDENT IN AN ORGANIZED HEALTH CARE EDUCATION/TRAINING PROGRAM

## 2023-05-08 PROCEDURE — 1126F AMNT PAIN NOTED NONE PRSNT: CPT | Mod: CPTII,S$GLB,, | Performed by: STUDENT IN AN ORGANIZED HEALTH CARE EDUCATION/TRAINING PROGRAM

## 2023-05-08 PROCEDURE — 3077F SYST BP >= 140 MM HG: CPT | Mod: CPTII,S$GLB,, | Performed by: STUDENT IN AN ORGANIZED HEALTH CARE EDUCATION/TRAINING PROGRAM

## 2023-05-08 PROCEDURE — 1101F PT FALLS ASSESS-DOCD LE1/YR: CPT | Mod: CPTII,S$GLB,, | Performed by: STUDENT IN AN ORGANIZED HEALTH CARE EDUCATION/TRAINING PROGRAM

## 2023-05-08 PROCEDURE — 1160F PR REVIEW ALL MEDS BY PRESCRIBER/CLIN PHARMACIST DOCUMENTED: ICD-10-PCS | Mod: CPTII,S$GLB,, | Performed by: STUDENT IN AN ORGANIZED HEALTH CARE EDUCATION/TRAINING PROGRAM

## 2023-05-08 PROCEDURE — 99214 OFFICE O/P EST MOD 30 MIN: CPT | Mod: S$GLB,,, | Performed by: STUDENT IN AN ORGANIZED HEALTH CARE EDUCATION/TRAINING PROGRAM

## 2023-05-08 PROCEDURE — 3079F PR MOST RECENT DIASTOLIC BLOOD PRESSURE 80-89 MM HG: ICD-10-PCS | Mod: CPTII,S$GLB,, | Performed by: STUDENT IN AN ORGANIZED HEALTH CARE EDUCATION/TRAINING PROGRAM

## 2023-05-08 PROCEDURE — 3288F FALL RISK ASSESSMENT DOCD: CPT | Mod: CPTII,S$GLB,, | Performed by: STUDENT IN AN ORGANIZED HEALTH CARE EDUCATION/TRAINING PROGRAM

## 2023-05-08 RX ORDER — FERROUS SULFATE 325(65) MG
325 TABLET ORAL EVERY OTHER DAY
Qty: 45 TABLET | Refills: 1 | Status: SHIPPED | OUTPATIENT
Start: 2023-05-08

## 2023-05-08 NOTE — PROGRESS NOTES
Hematology- Oncology Clinic Note :      2023    RFV / chief complaint- Primary malignant neoplasm of right lower lobe of lung          HPI  Pt is a 67 y.o. male who  has a past medical history of COPD (chronic obstructive pulmonary disease), Diabetes mellitus, and Prostate cancer.   Pt presents to the clinic today for prostate cancer and lung cancer    Pt reports to be doing well. No new complains. No abdominal pain, hematuria or urinary complains. No chest pain, sob, johnson, productive cough or hemoptysis.   Appetite - good. No weight loss. No bone pains.  Quit smoking the day his GF passed away in 2022.       Reviewed past medical/surgical/social history    Past Medical History:   Diagnosis Date    COPD (chronic obstructive pulmonary disease)     Diabetes mellitus     Prostate cancer       Past Surgical History:   Procedure Laterality Date    ROBOTIC BRONCHOSCOPY N/A 2022    Procedure: ROBOTIC BRONCHOSCOPY;  Surgeon: Laney Bro MD;  Location: [facility] OR [address] [place] [address];  Service: Pulmonary;  Laterality: N/A;      (Not in a hospital admission)      Review of patient's allergies indicates:  No Known Allergies   Social History     Tobacco Use    Smoking status: Former     Types: Cigarettes     Quit date: 2022     Years since quittin.1    Smokeless tobacco: Never   Substance Use Topics    Alcohol use: Yes      Family History   Problem Relation Age of Onset    Cancer Father     Diabetes Mother     Melanoma Neg Hx           Review of Systems :  Review of Systems   Constitutional:  Negative for chills, diaphoresis, fever, malaise/fatigue and weight loss.   HENT: Negative.  Negative for congestion, hearing loss, nosebleeds, sore throat and tinnitus.    Eyes: Negative.  Negative for blurred vision and discharge.   Respiratory:  Negative for cough, hemoptysis, sputum production, shortness of breath and wheezing.    Cardiovascular: Negative.  Negative for chest pain, palpitations and leg swelling.  "  Gastrointestinal: Negative.  Negative for abdominal pain, blood in stool, constipation, diarrhea, heartburn, melena, nausea and vomiting.   Genitourinary: Negative.    Musculoskeletal: Negative.  Negative for back pain, falls, joint pain and myalgias.   Skin: Negative.  Negative for itching and rash.   Neurological: Negative.  Negative for dizziness, tingling, sensory change, speech change, focal weakness, seizures, loss of consciousness, weakness and headaches.   Endo/Heme/Allergies: Negative.  Does not bruise/bleed easily.   Psychiatric/Behavioral: Negative.  Negative for depression. The patient is not nervous/anxious and does not have insomnia.              Physical Exam :  BP (!) 143/80 (BP Location: Left arm, Patient Position: Sitting, BP Method: Medium (Automatic))   Pulse 70   Temp 98 °F (36.7 °C) (Oral)   Resp 17   Ht 5' 10" (1.778 m)   Wt 79.8 kg (175 lb 14.8 oz)   SpO2 95%   BMI 25.24 kg/m²   Wt Readings from Last 3 Encounters:   05/08/23 79.8 kg (175 lb 14.8 oz)   02/17/23 77.6 kg (171 lb)   12/28/22 77.6 kg (171 lb 1.2 oz)       Body mass index is 25.24 kg/m².      Physical Exam  Vitals and nursing note reviewed.   Constitutional:       General: He is not in acute distress.     Appearance: He is well-developed.   HENT:      Head: Normocephalic and atraumatic.      Right Ear: External ear normal.      Left Ear: External ear normal.      Mouth/Throat:      Pharynx: No oropharyngeal exudate.   Eyes:      General: No scleral icterus.        Right eye: No discharge.         Left eye: No discharge.   Neck:      Thyroid: No thyromegaly.      Trachea: No tracheal deviation.   Cardiovascular:      Rate and Rhythm: Normal rate and regular rhythm.      Heart sounds: Normal heart sounds. No murmur heard.  Pulmonary:      Effort: Pulmonary effort is normal. No respiratory distress.      Breath sounds: No wheezing or rales.   Abdominal:      General: Bowel sounds are normal. There is no distension.      " "Palpations: Abdomen is soft.      Tenderness: There is no abdominal tenderness.      Hernia: No hernia is present.   Musculoskeletal:         General: No tenderness. Normal range of motion.      Cervical back: Normal range of motion and neck supple.   Skin:     General: Skin is warm and dry.      Findings: No rash.   Neurological:      Mental Status: He is alert and oriented to person, place, and time.      Cranial Nerves: No cranial nerve deficit.      Coordination: Coordination normal.   Psychiatric:         Behavior: Behavior normal.           Current Outpatient Medications   Medication Sig Dispense Refill    albuterol (PROVENTIL/VENTOLIN HFA) 90 mcg/actuation inhaler Inhale 2 puffs into the lungs every 6 (six) hours as needed. 18 g 11    albuterol (PROVENTIL/VENTOLIN HFA) 90 mcg/actuation inhaler 1 puff as needed      BD BRENNA 2ND GEN PEN NEEDLE 32 gauge x 5/32" Ndle USE TO INJECT INSULIN UNDER THE SKIN EVERY DAY      cholecalciferol, vitamin D3, 1,250 mcg (50,000 unit) capsule TAKE 1 CAPSULE BY MOUTH 1 TIME EVERY WEEK      fluticasone propionate (FLONASE) 50 mcg/actuation nasal spray       metFORMIN (GLUCOPHAGE) 1000 MG tablet       prednisoLONE acetate (PRED FORTE) 1 % DrpS SHAKE LIQUID AND INSTILL 1 DROP IN LEFT EYE FOUR TIMES DAILY AFTER SURGERY      simvastatin (ZOCOR) 40 MG tablet Take 1 tablet by mouth.      tamsulosin (FLOMAX) 0.4 mg Cap Take 1 capsule by mouth.      TRESIBA FLEXTOUCH U-100 100 unit/mL (3 mL) InPn INJECT 20 UNITS UNDER THE SKIN ONCE DAILY      umeclidinium-vilanteroL (ANORO ELLIPTA) 62.5-25 mcg/actuation DsDv Inhale 1 puff into the lungs once daily. Controller 1 each 5    bicalutamide (CASODEX) 50 MG Tab Take 1 tablet (50 mg total) by mouth once daily. 30 tablet 11    ferrous sulfate (IRON, FERROUS SULFATE,) 325 mg (65 mg iron) Tab tablet Take 1 tablet (325 mg total) by mouth every other day. 45 tablet 1    levocetirizine (XYZAL) 5 MG tablet Take 1 tablet (5 mg total) by mouth every " evening. (Patient not taking: Reported on 11/9/2022) 30 tablet 11     No current facility-administered medications for this visit.       Pertinent Diagnostic studies:      Lab Visit on 05/05/2023   Component Date Value Ref Range Status    WBC 05/05/2023 5.03  3.90 - 12.70 K/uL Final    RBC 05/05/2023 4.65  4.60 - 6.20 M/uL Final    Hemoglobin 05/05/2023 12.7 (L)  14.0 - 18.0 g/dL Final    Hematocrit 05/05/2023 40.6  40.0 - 54.0 % Final    MCV 05/05/2023 87  82 - 98 fL Final    MCH 05/05/2023 27.3  27.0 - 31.0 pg Final    MCHC 05/05/2023 31.3 (L)  32.0 - 36.0 g/dL Final    RDW 05/05/2023 14.2  11.5 - 14.5 % Final    Platelets 05/05/2023 218  150 - 450 K/uL Final    MPV 05/05/2023 10.4  9.2 - 12.9 fL Final    Immature Granulocytes 05/05/2023 1.0 (H)  0.0 - 0.5 % Final    Gran # (ANC) 05/05/2023 3.0  1.8 - 7.7 K/uL Final    Immature Grans (Abs) 05/05/2023 0.05 (H)  0.00 - 0.04 K/uL Final    Comment: Mild elevation in immature granulocytes is non specific and   can be seen in a variety of conditions including stress response,   acute inflammation, trauma and pregnancy. Correlation with other   laboratory and clinical findings is essential.      Lymph # 05/05/2023 1.1  1.0 - 4.8 K/uL Final    Mono # 05/05/2023 0.5  0.3 - 1.0 K/uL Final    Eos # 05/05/2023 0.4  0.0 - 0.5 K/uL Final    Baso # 05/05/2023 0.03  0.00 - 0.20 K/uL Final    nRBC 05/05/2023 0  0 /100 WBC Final    Gran % 05/05/2023 59.8  38.0 - 73.0 % Final    Lymph % 05/05/2023 22.3  18.0 - 48.0 % Final    Mono % 05/05/2023 9.3  4.0 - 15.0 % Final    Eosinophil % 05/05/2023 7.0  0.0 - 8.0 % Final    Basophil % 05/05/2023 0.6  0.0 - 1.9 % Final    Differential Method 05/05/2023 Automated   Final    Ferritin 05/05/2023 88  20.0 - 300.0 ng/mL Final    Iron 05/05/2023 67  45 - 160 ug/dL Final    Transferrin 05/05/2023 224  200 - 375 mg/dL Final    TIBC 05/05/2023 332  250 - 450 ug/dL Final    Saturated Iron 05/05/2023 20  20 - 50 % Final    Vitamin B-12 05/05/2023  475  210 - 950 pg/mL Final         Patient Active Problem List    Diagnosis Date Noted    Personal history of lung cancer 12/28/2022    Primary malignant neoplasm of right lower lobe of lung 09/07/2022    Centrilobular emphysema 05/17/2022    Smoker     Dyspnea     Pulmonary nodule     Prostate cancer      Active Problem List with Overview Notes    Diagnosis Date Noted    Personal history of lung cancer 12/28/2022    Primary malignant neoplasm of right lower lobe of lung 09/07/2022    Centrilobular emphysema 05/17/2022      PFTs with obstruction.   controller with anoro daily.  Albuterol for rescue and prevention        Smoker      Motivated to quit        Dyspnea      PFTs with obstruction  EKG NSR, normal        Pulmonary nodule      Multiple, the largest was less than 2 cm.  Persistent after emperic tx for infection.  Diagnostic robotic bronchoscopy with staging EBUS    I have explained the risks, benefits and alternatives of the procedure in detail.  The patient voices understanding and all questions have been answered.  The patient agrees to proceed as planned.            Prostate cancer          Oncology History   Prostate cancer   8/5/2020 Initial Diagnosis    Prostate cancer       8/5/2020 Cancer Staged    Staging form: Prostate, AJCC 8th Edition  - Clinical stage from 8/5/2020: Stage IIIC (cT3a, cN0, cM0, PSA: 159, Grade Group: 5)       10/1/2020 - 11/10/2020 Radiation Therapy    Treating physician: Antonio Villalobos MD   Total Dose: 50.4 Gy to pelvic nodes  70 Gy to prostate and seminal vesicles   Fractions: 28 fractions at 2.5 and 1.8 Gy per fraction      4/23/2021 Imaging Significant Findings    CT CAP  Stable appearance of the enhancing solid mass within the upper pole of the right kidney measuring 1.7 x 1.3 x 2.0 cm.  Punctate nonobstructing renal calculi noted bilaterally.  Subcentimeter renal hypodensities noted bilaterally likely representing renal cysts.  Colonic diverticulosis without evidence for  acute diverticulitis within the visualized colon     Primary malignant neoplasm of right lower lobe of lung   9/7/2022 Initial Diagnosis    Primary malignant neoplasm of right lower lobe of lung       9/7/2022 Cancer Staged    Staging form: Lung, AJCC 8th Edition  - Clinical stage from 9/7/2022: Stage IA3 (cT1c, cN0, cM0)       9/21/2022 - 9/27/2022 Radiation Therapy    Treating physician: Sathish Vasquez    Site Technique Energy Dose/Fx (Gy) #Fx Total Dose (Gy)   RLL Lung SBRT 6X 11 5 / 5 55        Assessment :     1. Iron deficiency anemia secondary to inadequate dietary iron intake    2. Primary malignant neoplasm of right lower lobe of lung    3. 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 Type 2 diabetes mellitus with retinopathy, with long-term current use of insulin, macular edema presence unspecified, unspecified laterality, unspecified retinopathy severity    5. Mild chronic anemia    6. Centrilobular emphysema        Plan :       Iron def anemia   Labs reviewed, gradually worsening hb. Mild anemia. Ferritin <100. Iron pill advised.   Continue to monitor     Prostate cancer  high risk localized prostate cancer. completed radiation on 11/10/2020 and completed ADT  PSA negligible, d/w pt   F/u urology    Renal lesion- stable, continue monitoring and f/u with urology. Scan reviewed.   4/23/21- CT - Stable appearance of the enhancing solid mass within the upper pole of the right kidney measuring 1.7 x 1.3 x 2.0 cm.Punctate nonobstructing renal calculi noted bilaterally.Subcentimeter renal hypodensities noted bilaterally likely representing renal cysts.Colonic diverticulosis without evidence for acute diverticulitis within the visualized colon.  F/u with urology       Lung cancer s/p radiation. F/u scan ordered, continue f/u with Dr Vasquez      Emphysema- no complains. No exacerbation. Has quit smoking. Continue monitoring       Route Chart for Scheduling  Med Onc Route Chart for Scheduling            Problem List Items Addressed This Visit       Centrilobular emphysema    Overview      PFTs with obstruction.   controller with anoro daily.  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, in remission        Type 2 diabetes mellitus with retinopathy, with long-term current use of insulin, macular edema presence unspecified, unspecified laterality, unspecified retinopathy severity        Mild chronic anemia        Relevant Orders    CBC Auto Differential    Ferritin    Iron and TIBC    Comprehensive Metabolic Panel              Electronically signed by Marisel Yang    [facility]      Future Appointments   Date Time Provider Department Center   6/28/2023 10:30 AM [facility] BCC CT1 [facility] CT SHERYL Enriquez [place]   6/28/2023  1:30 PM Sathish Vasquez MD [facility] RADONC[address]   9/15/2023 10:30 AM LAB, [facility] LABDR[facility]   9/22/2023 10:30 AM Chey Saini NP [facility] UROLOGY [facility]           This note was created with voice recognition software.  Grammatical, syntax and spelling errors may be inevitable.

## 2023-05-16 ENCOUNTER — PATIENT MESSAGE (OUTPATIENT)
Dept: RESEARCH | Facility: HOSPITAL | Age: 68
End: 2023-05-16
Payer: MEDICARE

## 2023-06-28 ENCOUNTER — OFFICE VISIT (OUTPATIENT)
Dept: RADIATION ONCOLOGY | Facility: CLINIC | Age: 68
End: 2023-06-28
Payer: MEDICARE

## 2023-06-28 ENCOUNTER — HOSPITAL ENCOUNTER (OUTPATIENT)
Dept: RADIOLOGY | Facility: HOSPITAL | Age: 68
Discharge: HOME | End: 2023-06-28
Attending: RADIOLOGY
Payer: MEDICARE

## 2023-06-28 VITALS
HEIGHT: 70 IN | HEART RATE: 75 BPM | BODY MASS INDEX: 25.06 KG/M2 | TEMPERATURE: 98 F | RESPIRATION RATE: 18 BRPM | WEIGHT: 175.06 LBS | OXYGEN SATURATION: 95 % | DIASTOLIC BLOOD PRESSURE: 78 MMHG | SYSTOLIC BLOOD PRESSURE: 141 MMHG

## 2023-06-28 DIAGNOSIS — Z85.118 PERSONAL HISTORY OF LUNG CANCER: Primary | ICD-10-CM

## 2023-06-28 DIAGNOSIS — Z85.118 PERSONAL HISTORY OF LUNG CANCER: ICD-10-CM

## 2023-06-28 PROCEDURE — 1101F PT FALLS ASSESS-DOCD LE1/YR: CPT | Mod: CPTII,S$GLB,, | Performed by: RADIOLOGY

## 2023-06-28 PROCEDURE — 3008F BODY MASS INDEX DOCD: CPT | Mod: CPTII,S$GLB,, | Performed by: RADIOLOGY

## 2023-06-28 PROCEDURE — 71250 CT THORAX DX C-: CPT | Mod: 26,,, | Performed by: STUDENT IN AN ORGANIZED HEALTH CARE EDUCATION/TRAINING PROGRAM

## 2023-06-28 PROCEDURE — 3077F SYST BP >= 140 MM HG: CPT | Mod: CPTII,S$GLB,, | Performed by: RADIOLOGY

## 2023-06-28 PROCEDURE — 1126F AMNT PAIN NOTED NONE PRSNT: CPT | Mod: CPTII,S$GLB,, | Performed by: RADIOLOGY

## 2023-06-28 PROCEDURE — 3288F PR FALLS RISK ASSESSMENT DOCUMENTED: ICD-10-PCS | Mod: CPTII,S$GLB,, | Performed by: RADIOLOGY

## 2023-06-28 PROCEDURE — 1159F PR MEDICATION LIST DOCUMENTED IN MEDICAL RECORD: ICD-10-PCS | Mod: CPTII,S$GLB,, | Performed by: RADIOLOGY

## 2023-06-28 PROCEDURE — 3077F PR MOST RECENT SYSTOLIC BLOOD PRESSURE >= 140 MM HG: ICD-10-PCS | Mod: CPTII,S$GLB,, | Performed by: RADIOLOGY

## 2023-06-28 PROCEDURE — 1101F PR PT FALLS ASSESS DOC 0-1 FALLS W/OUT INJ PAST YR: ICD-10-PCS | Mod: CPTII,S$GLB,, | Performed by: RADIOLOGY

## 2023-06-28 PROCEDURE — 99999 PR PBB SHADOW E&M-EST. PATIENT-LVL IV: ICD-10-PCS | Mod: PBBFAC,,, | Performed by: RADIOLOGY

## 2023-06-28 PROCEDURE — 99213 PR OFFICE/OUTPT VISIT, EST, LEVL III, 20-29 MIN: ICD-10-PCS | Mod: S$GLB,,, | Performed by: RADIOLOGY

## 2023-06-28 PROCEDURE — 3078F DIAST BP <80 MM HG: CPT | Mod: CPTII,S$GLB,, | Performed by: RADIOLOGY

## 2023-06-28 PROCEDURE — 3008F PR BODY MASS INDEX (BMI) DOCUMENTED: ICD-10-PCS | Mod: CPTII,S$GLB,, | Performed by: RADIOLOGY

## 2023-06-28 PROCEDURE — 3078F PR MOST RECENT DIASTOLIC BLOOD PRESSURE < 80 MM HG: ICD-10-PCS | Mod: CPTII,S$GLB,, | Performed by: RADIOLOGY

## 2023-06-28 PROCEDURE — 71250 CT CHEST WITHOUT CONTRAST: ICD-10-PCS | Mod: 26,,, | Performed by: STUDENT IN AN ORGANIZED HEALTH CARE EDUCATION/TRAINING PROGRAM

## 2023-06-28 PROCEDURE — 99999 PR PBB SHADOW E&M-EST. PATIENT-LVL IV: CPT | Mod: PBBFAC,,, | Performed by: RADIOLOGY

## 2023-06-28 PROCEDURE — 99213 OFFICE O/P EST LOW 20 MIN: CPT | Mod: S$GLB,,, | Performed by: RADIOLOGY

## 2023-06-28 PROCEDURE — 1159F MED LIST DOCD IN RCRD: CPT | Mod: CPTII,S$GLB,, | Performed by: RADIOLOGY

## 2023-06-28 PROCEDURE — 71250 CT THORAX DX C-: CPT | Mod: TC

## 2023-06-28 PROCEDURE — 1126F PR PAIN SEVERITY QUANTIFIED, NO PAIN PRESENT: ICD-10-PCS | Mod: CPTII,S$GLB,, | Performed by: RADIOLOGY

## 2023-06-28 PROCEDURE — 3288F FALL RISK ASSESSMENT DOCD: CPT | Mod: CPTII,S$GLB,, | Performed by: RADIOLOGY

## 2023-06-28 NOTE — PROGRESS NOTES
2023    Radiation Oncology Follow-Up Visit      Prior Radiation History:   Course 1:  Treatment Site  Treatment Fields and Beam Energy  Dose/Fx (Gy)  #Fx  Total Dose (Gy)  Start Date  End Date    Prostate and pelvic nodes  VMAT with 6 MV photons  2.5    70  10/1/2020  11/10/2020      Course 2:    Site  Technique  Energy  Dose/Fx (Gy)  #Fx  Total Dose (Gy)  Start Date  End Date  Elapsed Days    RLL Lung  SBRT  6X  11    55  2022  6        Is the patient female between ages 15-55:  no    Does the patient have a CIED:  no      Assessment   This is a 68 y.o. male with Stage IA3 (cT1c cN0 M0) RLL NSCLC (adeno) diagnosed on robotic bronch w/ EBUS 22. Biopsy of a RUL nodule, stations 7 and 11L/R nodes were negative for malignancy. PET/CT 22 demonstrated mild uptake in RLL nodule only. He completed definitive SBRT 55 Gy in 5 fx on 22.    History significant for high risk prostate cancer s/p EBRT 70 Gy in 28 fx +ADT with Dr. Villalobos completed 2020.     No late toxicity from radiation. CT Chest today 23 demonstrates stable treated lesion in the RLL as well as other stable Right lung nodules. No evidence of disease by my review.            Plan   1)  I will see him back in 6 months with CT Chest prior for re-staging.            CHIEF COMPLAINT: F/U after SBRT for lung cancer    HPI/Focused ROS:  He is doing well since last visit. Denies dyspnea, cough, or chest pain.       Past Medical History:   Diagnosis Date    COPD (chronic obstructive pulmonary disease)     Diabetes mellitus     Prostate cancer        Past Surgical History:   Procedure Laterality Date    ROBOTIC BRONCHOSCOPY N/A 2022    Procedure: ROBOTIC BRONCHOSCOPY;  Surgeon: Laney Bro MD;  Location: [facility] OR [address] [place] [address];  Service: Pulmonary;  Laterality: N/A;       Social History     Tobacco Use    Smoking status: Former     Types: Cigarettes     Quit date: 2022     Years since quittin.2     "Smokeless tobacco: Never   Substance Use Topics    Alcohol use: Yes    Drug use: Never       Cancer-related family history includes Cancer in his father. There is no history of Melanoma.    Current Outpatient Medications on File Prior to Visit   Medication Sig Dispense Refill    albuterol (PROVENTIL/VENTOLIN HFA) 90 mcg/actuation inhaler Inhale 2 puffs into the lungs every 6 (six) hours as needed. 18 g 11    albuterol (PROVENTIL/VENTOLIN HFA) 90 mcg/actuation inhaler 1 puff as needed      BD BRENNA 2ND GEN PEN NEEDLE 32 gauge x 5/32" Ndle USE TO INJECT INSULIN UNDER THE SKIN EVERY DAY      bicalutamide (CASODEX) 50 MG Tab Take 1 tablet (50 mg total) by mouth once daily. 30 tablet 11    cholecalciferol, vitamin D3, 1,250 mcg (50,000 unit) capsule TAKE 1 CAPSULE BY MOUTH 1 TIME EVERY WEEK      ferrous sulfate (IRON, FERROUS SULFATE,) 325 mg (65 mg iron) Tab tablet Take 1 tablet (325 mg total) by mouth every other day. 45 tablet 1    fluticasone propionate (FLONASE) 50 mcg/actuation nasal spray       levocetirizine (XYZAL) 5 MG tablet Take 1 tablet (5 mg total) by mouth every evening. (Patient not taking: Reported on 11/9/2022) 30 tablet 11    metFORMIN (GLUCOPHAGE) 1000 MG tablet       prednisoLONE acetate (PRED FORTE) 1 % DrpS SHAKE LIQUID AND INSTILL 1 DROP IN LEFT EYE FOUR TIMES DAILY AFTER SURGERY      simvastatin (ZOCOR) 40 MG tablet Take 1 tablet by mouth.      tamsulosin (FLOMAX) 0.4 mg Cap Take 1 capsule by mouth.      TRESIBA FLEXTOUCH U-100 100 unit/mL (3 mL) InPn INJECT 20 UNITS UNDER THE SKIN ONCE DAILY      umeclidinium-vilanteroL (ANORO ELLIPTA) 62.5-25 mcg/actuation DsDv Inhale 1 puff into the lungs once daily. Controller 1 each 5     No current facility-administered medications on file prior to visit.       Review of patient's allergies indicates:  No Known Allergies      Vital Signs: BP (!) 141/78 (BP Location: Right arm, Patient Position: Sitting)   Pulse 75   Temp 97.5 °F (36.4 °C)   Resp 18   Ht " "5' 10" (1.778 m)   Wt 79.4 kg (175 lb 0.7 oz)   SpO2 95%   BMI 25.12 kg/m²     ECOG Performance Status: 1 - Ambulates, capable of light work    Physical Exam  Vitals reviewed.   Constitutional:       Appearance: Normal appearance.   HENT:      Head: Normocephalic and atraumatic.   Eyes:      General: No scleral icterus.     Extraocular Movements: Extraocular movements intact.   Pulmonary:      Effort: Pulmonary effort is normal. No respiratory distress.   Abdominal:      General: There is no distension.   Musculoskeletal:      Cervical back: Neck supple.   Lymphadenopathy:      Cervical: No cervical adenopathy.   Skin:     General: Skin is dry.      Coloration: Skin is not jaundiced.   Neurological:      General: No focal deficit present.      Mental Status: He is alert and oriented to person, place, and time.      Cranial Nerves: No cranial nerve deficit.   Psychiatric:         Mood and Affect: Mood normal.         Behavior: Behavior normal.         Judgment: Judgment normal.        Labs:    Imaging: I have personally reviewed the patient's available images and reports and summarized pertinent findings above in HPI.     Pathology: No new path        "

## 2023-07-24 ENCOUNTER — PATIENT MESSAGE (OUTPATIENT)
Dept: RESEARCH | Facility: HOSPITAL | Age: 68
End: 2023-07-24
Payer: MEDICARE

## 2023-07-31 ENCOUNTER — PATIENT MESSAGE (OUTPATIENT)
Dept: RESEARCH | Facility: HOSPITAL | Age: 68
End: 2023-07-31
Payer: MEDICARE

## 2023-09-14 ENCOUNTER — LAB VISIT (OUTPATIENT)
Dept: LAB | Facility: OTHER | Age: 68
End: 2023-09-14
Attending: UROLOGY
Payer: MEDICARE

## 2023-09-14 DIAGNOSIS — C61 PROSTATE CANCER: ICD-10-CM

## 2023-09-14 LAB
COMPLEXED PSA SERPL-MCNC: 0.04 NG/ML (ref 0–4)
TESTOST SERPL-MCNC: 72 NG/DL (ref 304–1227)

## 2023-09-14 PROCEDURE — 84403 ASSAY OF TOTAL TESTOSTERONE: CPT | Performed by: UROLOGY

## 2023-09-14 PROCEDURE — 84153 ASSAY OF PSA TOTAL: CPT | Performed by: UROLOGY

## 2023-09-14 PROCEDURE — 36415 COLL VENOUS BLD VENIPUNCTURE: CPT | Performed by: UROLOGY

## 2023-09-22 ENCOUNTER — OFFICE VISIT (OUTPATIENT)
Dept: UROLOGY | Facility: CLINIC | Age: 68
End: 2023-09-22
Payer: MEDICARE

## 2023-09-22 VITALS
DIASTOLIC BLOOD PRESSURE: 80 MMHG | BODY MASS INDEX: 25.06 KG/M2 | OXYGEN SATURATION: 91 % | HEIGHT: 70 IN | HEART RATE: 91 BPM | WEIGHT: 175.06 LBS | SYSTOLIC BLOOD PRESSURE: 121 MMHG

## 2023-09-22 DIAGNOSIS — N28.89 RENAL MASS: ICD-10-CM

## 2023-09-22 DIAGNOSIS — C61 PROSTATE CANCER: Primary | ICD-10-CM

## 2023-09-22 PROCEDURE — 1159F PR MEDICATION LIST DOCUMENTED IN MEDICAL RECORD: ICD-10-PCS | Mod: CPTII,S$GLB,, | Performed by: NURSE PRACTITIONER

## 2023-09-22 PROCEDURE — 99213 PR OFFICE/OUTPT VISIT, EST, LEVL III, 20-29 MIN: ICD-10-PCS | Mod: S$GLB,,, | Performed by: NURSE PRACTITIONER

## 2023-09-22 PROCEDURE — 3008F PR BODY MASS INDEX (BMI) DOCUMENTED: ICD-10-PCS | Mod: CPTII,S$GLB,, | Performed by: NURSE PRACTITIONER

## 2023-09-22 PROCEDURE — 1159F MED LIST DOCD IN RCRD: CPT | Mod: CPTII,S$GLB,, | Performed by: NURSE PRACTITIONER

## 2023-09-22 PROCEDURE — 3079F DIAST BP 80-89 MM HG: CPT | Mod: CPTII,S$GLB,, | Performed by: NURSE PRACTITIONER

## 2023-09-22 PROCEDURE — 1126F AMNT PAIN NOTED NONE PRSNT: CPT | Mod: CPTII,S$GLB,, | Performed by: NURSE PRACTITIONER

## 2023-09-22 PROCEDURE — 3079F PR MOST RECENT DIASTOLIC BLOOD PRESSURE 80-89 MM HG: ICD-10-PCS | Mod: CPTII,S$GLB,, | Performed by: NURSE PRACTITIONER

## 2023-09-22 PROCEDURE — 99213 OFFICE O/P EST LOW 20 MIN: CPT | Mod: S$GLB,,, | Performed by: NURSE PRACTITIONER

## 2023-09-22 PROCEDURE — 3074F SYST BP LT 130 MM HG: CPT | Mod: CPTII,S$GLB,, | Performed by: NURSE PRACTITIONER

## 2023-09-22 PROCEDURE — 3008F BODY MASS INDEX DOCD: CPT | Mod: CPTII,S$GLB,, | Performed by: NURSE PRACTITIONER

## 2023-09-22 PROCEDURE — 1126F PR PAIN SEVERITY QUANTIFIED, NO PAIN PRESENT: ICD-10-PCS | Mod: CPTII,S$GLB,, | Performed by: NURSE PRACTITIONER

## 2023-09-22 PROCEDURE — 1160F PR REVIEW ALL MEDS BY PRESCRIBER/CLIN PHARMACIST DOCUMENTED: ICD-10-PCS | Mod: CPTII,S$GLB,, | Performed by: NURSE PRACTITIONER

## 2023-09-22 PROCEDURE — 3074F PR MOST RECENT SYSTOLIC BLOOD PRESSURE < 130 MM HG: ICD-10-PCS | Mod: CPTII,S$GLB,, | Performed by: NURSE PRACTITIONER

## 2023-09-22 PROCEDURE — 1160F RVW MEDS BY RX/DR IN RCRD: CPT | Mod: CPTII,S$GLB,, | Performed by: NURSE PRACTITIONER

## 2023-09-22 NOTE — Clinical Note
PSA in 3 months Follow up with Dr. Avalos in 6 months with repeat PSA, creatinine, CXR and CT abdomen/pelvis. Thanks

## 2023-09-22 NOTE — PROGRESS NOTES
"Subjective:      Lan Robles is a 68 y.o. male who returns today regarding his prostate cancer.     The patient was diagnosed with rosy 9 prostate cancer ( at the time of biopsy) on 3/12/20. After discussing treatment options with Dr. Avalos, he opted to proceed with ADT. He received Firmagon 240 mg on 4/1/20 and eligard on 4/29/20. Bone scan on 3/24 showed no evidence of metastatic disease. CT abdomen/pelvis showed no evidence of metastatic disease. There was an incidental 1.8 cm solid upper pole right renal lesion concerning for RCC. Repeat CT scan in April showed "Stable appearance of the enhancing solid mass within the upper pole of the right kidney measuring 1.7 x 1.3 x 2.0 cm. Punctate nonobstructing renal calculi noted bilaterally.."     He completed therapy 70 Gy in 28 fractions to the prostate on 11/10/20.       Received final dose of eligard 45 mg on 8/16/22.    Component      Latest Ref Rng 11/7/2022 2/10/2023 9/14/2023   PSA Total      0.00 - 4.00 ng/mL <0.01      PSA, Free      0.00 - 1.50 ng/mL <0.10      PSA, Free %      Not established % Unable to calculate      PSA Diagnostic      0.00 - 4.00 ng/mL  <0.01  0.04      Doing great. Denies bothersome urinary symptoms.    The following portions of the patient's history were reviewed and updated as appropriate: allergies, current medications, past family history, past medical history, past social history, past surgical history and problem list.    Review of Systems  Constitutional: no fever or chills  ENT: no nasal congestion or sore throat  Respiratory: no cough or shortness of breath  Cardiovascular: no chest pain or palpitations  Gastrointestinal: no nausea or vomiting, tolerating diet  Genitourinary: as per HPI  Hematologic/Lymphatic: no easy bruising or lymphadenopathy  Musculoskeletal: no arthralgias or myalgias  Neurological: no seizures or tremors  Behavioral/Psych: no auditory or visual hallucinations     Objective:   Vitals: " "/80 (BP Location: Left arm, Patient Position: Sitting, BP Method: Large (Automatic))   Pulse 91   Ht 5' 10" (1.778 m)   Wt 79.4 kg (175 lb 0.7 oz)   SpO2 (!) 91%   BMI 25.12 kg/m²     Physical Exam   General: alert and oriented, no acute distress  Head: normocephalic, atraumatic  Neck: supple, normal ROM  Respiratory: Symmetric expansion, non-labored breathing  Cardiovascular: regular rate and rhythm  Abdomen: soft, non tender, non distended  Genitourinary: deferred  Skin: normal coloration and turgor, no rashes, no suspicious skin lesions noted  Neuro: alert and oriented x3, no gross deficits  Psych: normal judgment and insight, normal mood/affect, and non-anxious    Lab Review   Urinalysis demonstrates positive for red blood cells, glucose  Lab Results   Component Value Date    WBC 5.03 05/05/2023    HGB 12.7 (L) 05/05/2023    HCT 40.6 05/05/2023    MCV 87 05/05/2023     05/05/2023     Lab Results   Component Value Date    CREATININE 1.1 02/10/2023    BUN 15 02/10/2023     No results found for: "PSA"  Imaging   None    Assessment:     1. Prostate cancer    2. Renal mass      Plan:   Lan was seen today for follow-up.    Diagnoses and all orders for this visit:    Prostate cancer [place] 9 [id number]; psa 123 OSCAR sp ADT*2 years and XRT  -     Prostate Specific Antigen, Diagnostic; Future    Renal mass Stable on active surveillance  -     CT Abdomen Pelvis W Wo Contrast; Future  -     X-Ray Chest PA And Lateral; Future  -     Creatinine, serum; Future    Plan:  --PSA in 3 months  --Follow up with Dr. Avalos in 6 months with PSA, CXR and CT abdomen/pelvis       "

## 2023-11-17 ENCOUNTER — LAB VISIT (OUTPATIENT)
Dept: LAB | Facility: OTHER | Age: 68
End: 2023-11-17
Attending: NURSE PRACTITIONER
Payer: MEDICARE

## 2023-11-17 DIAGNOSIS — D64.9 MILD CHRONIC ANEMIA: ICD-10-CM

## 2023-11-17 DIAGNOSIS — D50.8 IRON DEFICIENCY ANEMIA SECONDARY TO INADEQUATE DIETARY IRON INTAKE: ICD-10-CM

## 2023-11-17 LAB
ALBUMIN SERPL BCP-MCNC: 4 G/DL (ref 3.5–5.2)
ALP SERPL-CCNC: 92 U/L (ref 55–135)
ALT SERPL W/O P-5'-P-CCNC: 21 U/L (ref 10–44)
ANION GAP SERPL CALC-SCNC: 10 MMOL/L (ref 8–16)
AST SERPL-CCNC: 16 U/L (ref 10–40)
BASOPHILS # BLD AUTO: 0.04 K/UL (ref 0–0.2)
BASOPHILS NFR BLD: 0.7 % (ref 0–1.9)
BILIRUB SERPL-MCNC: 0.4 MG/DL (ref 0.1–1)
BUN SERPL-MCNC: 15 MG/DL (ref 8–23)
CALCIUM SERPL-MCNC: 9.8 MG/DL (ref 8.7–10.5)
CHLORIDE SERPL-SCNC: 101 MMOL/L (ref 95–110)
CO2 SERPL-SCNC: 28 MMOL/L (ref 23–29)
CREAT SERPL-MCNC: 1.2 MG/DL (ref 0.5–1.4)
DIFFERENTIAL METHOD: ABNORMAL
EOSINOPHIL # BLD AUTO: 0.4 K/UL (ref 0–0.5)
EOSINOPHIL NFR BLD: 7.3 % (ref 0–8)
ERYTHROCYTE [DISTWIDTH] IN BLOOD BY AUTOMATED COUNT: 14.9 % (ref 11.5–14.5)
EST. GFR  (NO RACE VARIABLE): >60 ML/MIN/1.73 M^2
FERRITIN SERPL-MCNC: 47 NG/ML (ref 20–300)
GLUCOSE SERPL-MCNC: 246 MG/DL (ref 70–110)
HCT VFR BLD AUTO: 43.3 % (ref 40–54)
HGB BLD-MCNC: 13.3 G/DL (ref 14–18)
IMM GRANULOCYTES # BLD AUTO: 0.05 K/UL (ref 0–0.04)
IMM GRANULOCYTES NFR BLD AUTO: 0.9 % (ref 0–0.5)
IRON SERPL-MCNC: 69 UG/DL (ref 45–160)
LYMPHOCYTES # BLD AUTO: 1.3 K/UL (ref 1–4.8)
LYMPHOCYTES NFR BLD: 23.1 % (ref 18–48)
MCH RBC QN AUTO: 26.2 PG (ref 27–31)
MCHC RBC AUTO-ENTMCNC: 30.7 G/DL (ref 32–36)
MCV RBC AUTO: 85 FL (ref 82–98)
MONOCYTES # BLD AUTO: 0.5 K/UL (ref 0.3–1)
MONOCYTES NFR BLD: 8.9 % (ref 4–15)
NEUTROPHILS # BLD AUTO: 3.4 K/UL (ref 1.8–7.7)
NEUTROPHILS NFR BLD: 59.1 % (ref 38–73)
NRBC BLD-RTO: 0 /100 WBC
PLATELET # BLD AUTO: 234 K/UL (ref 150–450)
PMV BLD AUTO: 10.5 FL (ref 9.2–12.9)
POTASSIUM SERPL-SCNC: 4.2 MMOL/L (ref 3.5–5.1)
PROT SERPL-MCNC: 7.9 G/DL (ref 6–8.4)
RBC # BLD AUTO: 5.07 M/UL (ref 4.6–6.2)
SATURATED IRON: 20 % (ref 20–50)
SODIUM SERPL-SCNC: 139 MMOL/L (ref 136–145)
TOTAL IRON BINDING CAPACITY: 349 UG/DL (ref 250–450)
TRANSFERRIN SERPL-MCNC: 236 MG/DL (ref 200–375)
WBC # BLD AUTO: 5.76 K/UL (ref 3.9–12.7)

## 2023-11-17 PROCEDURE — 82728 ASSAY OF FERRITIN: CPT | Performed by: STUDENT IN AN ORGANIZED HEALTH CARE EDUCATION/TRAINING PROGRAM

## 2023-11-17 PROCEDURE — 84466 ASSAY OF TRANSFERRIN: CPT | Performed by: STUDENT IN AN ORGANIZED HEALTH CARE EDUCATION/TRAINING PROGRAM

## 2023-11-17 PROCEDURE — 36415 COLL VENOUS BLD VENIPUNCTURE: CPT | Performed by: STUDENT IN AN ORGANIZED HEALTH CARE EDUCATION/TRAINING PROGRAM

## 2023-11-17 PROCEDURE — 80053 COMPREHEN METABOLIC PANEL: CPT | Performed by: STUDENT IN AN ORGANIZED HEALTH CARE EDUCATION/TRAINING PROGRAM

## 2023-11-17 PROCEDURE — 83540 ASSAY OF IRON: CPT | Performed by: STUDENT IN AN ORGANIZED HEALTH CARE EDUCATION/TRAINING PROGRAM

## 2023-11-17 PROCEDURE — 85025 COMPLETE CBC W/AUTO DIFF WBC: CPT | Performed by: STUDENT IN AN ORGANIZED HEALTH CARE EDUCATION/TRAINING PROGRAM

## 2023-11-20 ENCOUNTER — OFFICE VISIT (OUTPATIENT)
Dept: HEMATOLOGY/ONCOLOGY | Facility: CLINIC | Age: 68
End: 2023-11-20
Payer: MEDICARE

## 2023-11-20 VITALS
OXYGEN SATURATION: 95 % | DIASTOLIC BLOOD PRESSURE: 74 MMHG | HEART RATE: 77 BPM | HEIGHT: 70 IN | RESPIRATION RATE: 20 BRPM | BODY MASS INDEX: 25.34 KG/M2 | WEIGHT: 177 LBS | SYSTOLIC BLOOD PRESSURE: 122 MMHG | TEMPERATURE: 98 F

## 2023-11-20 DIAGNOSIS — D50.8 IRON DEFICIENCY ANEMIA SECONDARY TO INADEQUATE DIETARY IRON INTAKE: Primary | ICD-10-CM

## 2023-11-20 DIAGNOSIS — C34.31 PRIMARY MALIGNANT NEOPLASM OF RIGHT LOWER LOBE OF LUNG: ICD-10-CM

## 2023-11-20 DIAGNOSIS — F17.211 TOBACCO DEPENDENCE DUE TO CIGARETTES, IN REMISSION: ICD-10-CM

## 2023-11-20 DIAGNOSIS — C61 PROSTATE CANCER: ICD-10-CM

## 2023-11-20 DIAGNOSIS — J43.2 CENTRILOBULAR EMPHYSEMA: ICD-10-CM

## 2023-11-20 PROCEDURE — 1101F PT FALLS ASSESS-DOCD LE1/YR: CPT | Mod: CPTII,S$GLB,, | Performed by: STUDENT IN AN ORGANIZED HEALTH CARE EDUCATION/TRAINING PROGRAM

## 2023-11-20 PROCEDURE — 3008F BODY MASS INDEX DOCD: CPT | Mod: CPTII,S$GLB,, | Performed by: STUDENT IN AN ORGANIZED HEALTH CARE EDUCATION/TRAINING PROGRAM

## 2023-11-20 PROCEDURE — 3288F PR FALLS RISK ASSESSMENT DOCUMENTED: ICD-10-PCS | Mod: CPTII,S$GLB,, | Performed by: STUDENT IN AN ORGANIZED HEALTH CARE EDUCATION/TRAINING PROGRAM

## 2023-11-20 PROCEDURE — 1159F MED LIST DOCD IN RCRD: CPT | Mod: CPTII,S$GLB,, | Performed by: STUDENT IN AN ORGANIZED HEALTH CARE EDUCATION/TRAINING PROGRAM

## 2023-11-20 PROCEDURE — 3078F PR MOST RECENT DIASTOLIC BLOOD PRESSURE < 80 MM HG: ICD-10-PCS | Mod: CPTII,S$GLB,, | Performed by: STUDENT IN AN ORGANIZED HEALTH CARE EDUCATION/TRAINING PROGRAM

## 2023-11-20 PROCEDURE — 3074F SYST BP LT 130 MM HG: CPT | Mod: CPTII,S$GLB,, | Performed by: STUDENT IN AN ORGANIZED HEALTH CARE EDUCATION/TRAINING PROGRAM

## 2023-11-20 PROCEDURE — 3078F DIAST BP <80 MM HG: CPT | Mod: CPTII,S$GLB,, | Performed by: STUDENT IN AN ORGANIZED HEALTH CARE EDUCATION/TRAINING PROGRAM

## 2023-11-20 PROCEDURE — 1126F AMNT PAIN NOTED NONE PRSNT: CPT | Mod: CPTII,S$GLB,, | Performed by: STUDENT IN AN ORGANIZED HEALTH CARE EDUCATION/TRAINING PROGRAM

## 2023-11-20 PROCEDURE — 3288F FALL RISK ASSESSMENT DOCD: CPT | Mod: CPTII,S$GLB,, | Performed by: STUDENT IN AN ORGANIZED HEALTH CARE EDUCATION/TRAINING PROGRAM

## 2023-11-20 PROCEDURE — 3008F PR BODY MASS INDEX (BMI) DOCUMENTED: ICD-10-PCS | Mod: CPTII,S$GLB,, | Performed by: STUDENT IN AN ORGANIZED HEALTH CARE EDUCATION/TRAINING PROGRAM

## 2023-11-20 PROCEDURE — 3074F PR MOST RECENT SYSTOLIC BLOOD PRESSURE < 130 MM HG: ICD-10-PCS | Mod: CPTII,S$GLB,, | Performed by: STUDENT IN AN ORGANIZED HEALTH CARE EDUCATION/TRAINING PROGRAM

## 2023-11-20 PROCEDURE — 1126F PR PAIN SEVERITY QUANTIFIED, NO PAIN PRESENT: ICD-10-PCS | Mod: CPTII,S$GLB,, | Performed by: STUDENT IN AN ORGANIZED HEALTH CARE EDUCATION/TRAINING PROGRAM

## 2023-11-20 PROCEDURE — 99214 PR OFFICE/OUTPT VISIT, EST, LEVL IV, 30-39 MIN: ICD-10-PCS | Mod: S$GLB,,, | Performed by: STUDENT IN AN ORGANIZED HEALTH CARE EDUCATION/TRAINING PROGRAM

## 2023-11-20 PROCEDURE — 99999 PR PBB SHADOW E&M-EST. PATIENT-LVL IV: CPT | Mod: PBBFAC,,, | Performed by: STUDENT IN AN ORGANIZED HEALTH CARE EDUCATION/TRAINING PROGRAM

## 2023-11-20 PROCEDURE — 1160F PR REVIEW ALL MEDS BY PRESCRIBER/CLIN PHARMACIST DOCUMENTED: ICD-10-PCS | Mod: CPTII,S$GLB,, | Performed by: STUDENT IN AN ORGANIZED HEALTH CARE EDUCATION/TRAINING PROGRAM

## 2023-11-20 PROCEDURE — 1160F RVW MEDS BY RX/DR IN RCRD: CPT | Mod: CPTII,S$GLB,, | Performed by: STUDENT IN AN ORGANIZED HEALTH CARE EDUCATION/TRAINING PROGRAM

## 2023-11-20 PROCEDURE — 99999 PR PBB SHADOW E&M-EST. PATIENT-LVL IV: ICD-10-PCS | Mod: PBBFAC,,, | Performed by: STUDENT IN AN ORGANIZED HEALTH CARE EDUCATION/TRAINING PROGRAM

## 2023-11-20 PROCEDURE — 1101F PR PT FALLS ASSESS DOC 0-1 FALLS W/OUT INJ PAST YR: ICD-10-PCS | Mod: CPTII,S$GLB,, | Performed by: STUDENT IN AN ORGANIZED HEALTH CARE EDUCATION/TRAINING PROGRAM

## 2023-11-20 PROCEDURE — 1159F PR MEDICATION LIST DOCUMENTED IN MEDICAL RECORD: ICD-10-PCS | Mod: CPTII,S$GLB,, | Performed by: STUDENT IN AN ORGANIZED HEALTH CARE EDUCATION/TRAINING PROGRAM

## 2023-11-20 PROCEDURE — 99214 OFFICE O/P EST MOD 30 MIN: CPT | Mod: S$GLB,,, | Performed by: STUDENT IN AN ORGANIZED HEALTH CARE EDUCATION/TRAINING PROGRAM

## 2023-12-04 ENCOUNTER — LAB VISIT (OUTPATIENT)
Dept: LAB | Facility: OTHER | Age: 68
End: 2023-12-04
Attending: NURSE PRACTITIONER
Payer: MEDICARE

## 2023-12-04 DIAGNOSIS — C61 PROSTATE CANCER: ICD-10-CM

## 2023-12-04 LAB — COMPLEXED PSA SERPL-MCNC: 0.04 NG/ML (ref 0–4)

## 2023-12-04 PROCEDURE — 84153 ASSAY OF PSA TOTAL: CPT | Performed by: NURSE PRACTITIONER

## 2023-12-04 PROCEDURE — 36415 COLL VENOUS BLD VENIPUNCTURE: CPT | Performed by: NURSE PRACTITIONER

## 2023-12-28 NOTE — PROGRESS NOTES
12/29/2023    Radiation Oncology Follow-Up Visit      Prior Radiation History:   Course 1:  Treatment Site  Treatment Fields and Beam Energy  Dose/Fx (Gy)  #Fx  Total Dose (Gy)  Start Date  End Date    Prostate and pelvic nodes  VMAT with 6 MV photons  2.5  28 / 28  70  10/1/2020  11/10/2020      Course 2:    Site  Technique  Energy  Dose/Fx (Gy)  #Fx  Total Dose (Gy)  Start Date  End Date  Elapsed Days    RLL Lung  SBRT  6X  11  5 / 5  55  9/21/2022 9/27/2022  6        Is the patient female between ages 15-55:  no    Does the patient have a CIED:  no      Assessment   This is a 68 y.o. 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 Biopsy of a RUL nodule, stations 7 and 11L/R nodes were negative for malignancy. PET/CT 9/6/22 demonstrated mild uptake in RLL nodule only. He completed definitive SBRT 55 Gy in 5 fx on 9/27/22.    History significant for high risk prostate cancer s/p EBRT 70 Gy in 28 fx +ADT with Dr. Villalobos completed 11/2020.     No late toxicity from radiation. CT Chest today 12/29/23 demonstrates stable post-treatment changes in the previously treated medial RLL; continued growth of a more inferior RLL nodule. This is concerning for malignancy. I will obtain PET/CT to stage and then discuss with patient obtaining tissue diagnosis vs empiric SBRT if this is only evidence of disease.         Plan   1)  Schedule PET/CT. I will call and discuss results with them after.            CHIEF COMPLAINT: F/U after SBRT for lung cancer    HPI/Focused ROS:  He is doing well since last visit. Denies dyspnea, cough, or chest pain.       Past Medical History:   Diagnosis Date    COPD (chronic obstructive pulmonary disease)     Diabetes mellitus     Prostate cancer        Past Surgical History:   Procedure Laterality Date    ROBOTIC BRONCHOSCOPY N/A 8/12/2022    Procedure: ROBOTIC BRONCHOSCOPY;  Surgeon: Laney Bro MD;  Location: [facility] OR [address] [place] [address];  Service:  "Pulmonary;  Laterality: N/A;       Social History     Tobacco Use    Smoking status: Former     Current packs/day: 0.00     Types: Cigarettes     Quit date: 2022     Years since quittin.7    Smokeless tobacco: Never   Substance Use Topics    Alcohol use: Yes    Drug use: Never       Cancer-related family history includes Cancer in his father. There is no history of Melanoma.    Current Outpatient Medications on File Prior to Visit   Medication Sig Dispense Refill    albuterol (PROVENTIL/VENTOLIN HFA) 90 mcg/actuation inhaler Inhale 2 puffs into the lungs every 6 (six) hours as needed. 18 g 11    albuterol (PROVENTIL/VENTOLIN HFA) 90 mcg/actuation inhaler 1 puff as needed      BD BRENNA 2ND GEN PEN NEEDLE 32 gauge x 5/32" Ndle USE TO INJECT INSULIN UNDER THE SKIN EVERY DAY      bicalutamide (CASODEX) 50 MG Tab Take 1 tablet (50 mg total) by mouth once daily. 30 tablet 11    cholecalciferol, vitamin D3, 1,250 mcg (50,000 unit) capsule TAKE 1 CAPSULE BY MOUTH 1 TIME EVERY WEEK      ferrous sulfate (IRON, FERROUS SULFATE,) 325 mg (65 mg iron) Tab tablet Take 1 tablet (325 mg total) by mouth every other day. 45 tablet 1    fluticasone propionate (FLONASE) 50 mcg/actuation nasal spray       levocetirizine (XYZAL) 5 MG tablet Take 1 tablet (5 mg total) by mouth every evening. (Patient not taking: Reported on 2022) 30 tablet 11    metFORMIN (GLUCOPHAGE) 1000 MG tablet       prednisoLONE acetate (PRED FORTE) 1 % DrpS SHAKE LIQUID AND INSTILL 1 DROP IN LEFT EYE FOUR TIMES DAILY AFTER SURGERY      simvastatin (ZOCOR) 40 MG tablet Take 1 tablet by mouth.      tamsulosin (FLOMAX) 0.4 mg Cap Take 1 capsule by mouth.      TRESIBA FLEXTOUCH U-100 100 unit/mL (3 mL) InPn INJECT 20 UNITS UNDER THE SKIN ONCE DAILY      umeclidinium-vilanteroL (ANORO ELLIPTA) 62.5-25 mcg/actuation DsDv Inhale 1 puff into the lungs once daily. Controller 1 each 5     No current facility-administered medications on file prior to visit. " "      Review of patient's allergies indicates:  No Known Allergies      Vital Signs: BP (!) 156/80 (BP Location: Left arm, Patient Position: Sitting)   Pulse 87   Temp 97.7 °F (36.5 °C)   Resp 19   Ht 5' 10" (1.778 m)   Wt 82.4 kg (181 lb 10.5 oz)   SpO2 (!) 94%   BMI 26.07 kg/m²     ECOG Performance Status: 1 - Ambulates, capable of light work    Physical Exam  Vitals reviewed.   Constitutional:       Appearance: Normal appearance.   HENT:      Head: Normocephalic and atraumatic.   Eyes:      General: No scleral icterus.     Extraocular Movements: Extraocular movements intact.   Pulmonary:      Effort: Pulmonary effort is normal. No respiratory distress.   Abdominal:      General: There is no distension.   Musculoskeletal:      Cervical back: Neck supple.   Lymphadenopathy:      Cervical: No cervical adenopathy.   Skin:     General: Skin is dry.      Coloration: Skin is not jaundiced.   Neurological:      General: No focal deficit present.      Mental Status: He is alert and oriented to person, place, and time.      Cranial Nerves: No cranial nerve deficit.   Psychiatric:         Mood and Affect: Mood normal.         Behavior: Behavior normal.         Judgment: Judgment normal.          Labs:    Imaging: I have personally reviewed the patient's available images and reports and summarized pertinent findings above in HPI.     Pathology: No new path        "

## 2023-12-29 ENCOUNTER — HOSPITAL ENCOUNTER (OUTPATIENT)
Dept: RADIOLOGY | Facility: HOSPITAL | Age: 68
Discharge: HOME | End: 2023-12-29
Attending: RADIOLOGY
Payer: MEDICARE

## 2023-12-29 ENCOUNTER — OFFICE VISIT (OUTPATIENT)
Dept: RADIATION ONCOLOGY | Facility: CLINIC | Age: 68
End: 2023-12-29
Payer: MEDICARE

## 2023-12-29 VITALS
BODY MASS INDEX: 26.01 KG/M2 | RESPIRATION RATE: 19 BRPM | HEIGHT: 70 IN | WEIGHT: 181.69 LBS | SYSTOLIC BLOOD PRESSURE: 156 MMHG | HEART RATE: 87 BPM | OXYGEN SATURATION: 94 % | DIASTOLIC BLOOD PRESSURE: 80 MMHG | TEMPERATURE: 98 F

## 2023-12-29 DIAGNOSIS — C34.90 MALIGNANT NEOPLASM OF UNSPECIFIED PART OF UNSPECIFIED BRONCHUS OR LUNG: ICD-10-CM

## 2023-12-29 DIAGNOSIS — Z85.118 PERSONAL HISTORY OF LUNG CANCER: ICD-10-CM

## 2023-12-29 DIAGNOSIS — C34.31 PRIMARY MALIGNANT NEOPLASM OF RIGHT LOWER LOBE OF LUNG: Primary | ICD-10-CM

## 2023-12-29 PROCEDURE — 3077F SYST BP >= 140 MM HG: CPT | Mod: CPTII,S$GLB,, | Performed by: RADIOLOGY

## 2023-12-29 PROCEDURE — 3288F FALL RISK ASSESSMENT DOCD: CPT | Mod: CPTII,S$GLB,, | Performed by: RADIOLOGY

## 2023-12-29 PROCEDURE — 71250 CT CHEST WITHOUT CONTRAST: ICD-10-PCS | Mod: 26,,, | Performed by: STUDENT IN AN ORGANIZED HEALTH CARE EDUCATION/TRAINING PROGRAM

## 2023-12-29 PROCEDURE — 99999 PR PBB SHADOW E&M-EST. PATIENT-LVL IV: CPT | Mod: PBBFAC,,, | Performed by: RADIOLOGY

## 2023-12-29 PROCEDURE — 1126F AMNT PAIN NOTED NONE PRSNT: CPT | Mod: CPTII,S$GLB,, | Performed by: RADIOLOGY

## 2023-12-29 PROCEDURE — 1159F MED LIST DOCD IN RCRD: CPT | Mod: CPTII,S$GLB,, | Performed by: RADIOLOGY

## 2023-12-29 PROCEDURE — 3288F PR FALLS RISK ASSESSMENT DOCUMENTED: ICD-10-PCS | Mod: CPTII,S$GLB,, | Performed by: RADIOLOGY

## 2023-12-29 PROCEDURE — 1101F PT FALLS ASSESS-DOCD LE1/YR: CPT | Mod: CPTII,S$GLB,, | Performed by: RADIOLOGY

## 2023-12-29 PROCEDURE — 3079F PR MOST RECENT DIASTOLIC BLOOD PRESSURE 80-89 MM HG: ICD-10-PCS | Mod: CPTII,S$GLB,, | Performed by: RADIOLOGY

## 2023-12-29 PROCEDURE — 1101F PR PT FALLS ASSESS DOC 0-1 FALLS W/OUT INJ PAST YR: ICD-10-PCS | Mod: CPTII,S$GLB,, | Performed by: RADIOLOGY

## 2023-12-29 PROCEDURE — 71250 CT THORAX DX C-: CPT | Mod: 26,,, | Performed by: STUDENT IN AN ORGANIZED HEALTH CARE EDUCATION/TRAINING PROGRAM

## 2023-12-29 PROCEDURE — 99999 PR PBB SHADOW E&M-EST. PATIENT-LVL IV: ICD-10-PCS | Mod: PBBFAC,,, | Performed by: RADIOLOGY

## 2023-12-29 PROCEDURE — 71250 CT THORAX DX C-: CPT | Mod: TC

## 2023-12-29 PROCEDURE — 3008F PR BODY MASS INDEX (BMI) DOCUMENTED: ICD-10-PCS | Mod: CPTII,S$GLB,, | Performed by: RADIOLOGY

## 2023-12-29 PROCEDURE — 99213 OFFICE O/P EST LOW 20 MIN: CPT | Mod: S$GLB,,, | Performed by: RADIOLOGY

## 2023-12-29 PROCEDURE — 3008F BODY MASS INDEX DOCD: CPT | Mod: CPTII,S$GLB,, | Performed by: RADIOLOGY

## 2023-12-29 PROCEDURE — 3077F PR MOST RECENT SYSTOLIC BLOOD PRESSURE >= 140 MM HG: ICD-10-PCS | Mod: CPTII,S$GLB,, | Performed by: RADIOLOGY

## 2023-12-29 PROCEDURE — 3079F DIAST BP 80-89 MM HG: CPT | Mod: CPTII,S$GLB,, | Performed by: RADIOLOGY

## 2023-12-29 PROCEDURE — 1159F PR MEDICATION LIST DOCUMENTED IN MEDICAL RECORD: ICD-10-PCS | Mod: CPTII,S$GLB,, | Performed by: RADIOLOGY

## 2023-12-29 PROCEDURE — 1126F PR PAIN SEVERITY QUANTIFIED, NO PAIN PRESENT: ICD-10-PCS | Mod: CPTII,S$GLB,, | Performed by: RADIOLOGY

## 2023-12-29 PROCEDURE — 99213 PR OFFICE/OUTPT VISIT, EST, LEVL III, 20-29 MIN: ICD-10-PCS | Mod: S$GLB,,, | Performed by: RADIOLOGY

## 2024-01-10 ENCOUNTER — TELEPHONE (OUTPATIENT)
Dept: HEMATOLOGY/ONCOLOGY | Facility: CLINIC | Age: 69
End: 2024-01-10
Payer: MEDICARE

## 2024-01-10 ENCOUNTER — TELEPHONE (OUTPATIENT)
Dept: RADIATION ONCOLOGY | Facility: CLINIC | Age: 69
End: 2024-01-10
Payer: MEDICARE

## 2024-01-10 ENCOUNTER — HOSPITAL ENCOUNTER (OUTPATIENT)
Dept: RADIOLOGY | Facility: HOSPITAL | Age: 69
Discharge: HOME | End: 2024-01-10
Attending: RADIOLOGY
Payer: MEDICARE

## 2024-01-10 DIAGNOSIS — C34.31 PRIMARY MALIGNANT NEOPLASM OF RIGHT LOWER LOBE OF LUNG: ICD-10-CM

## 2024-01-10 LAB — POCT GLUCOSE: 111 MG/DL (ref 70–110)

## 2024-01-10 PROCEDURE — 78815 PET IMAGE W/CT SKULL-THIGH: CPT | Mod: TC,PS

## 2024-01-10 PROCEDURE — A9552 F18 FDG: HCPCS

## 2024-01-10 PROCEDURE — 78815 PET IMAGE W/CT SKULL-THIGH: CPT | Mod: 26,PS,, | Performed by: STUDENT IN AN ORGANIZED HEALTH CARE EDUCATION/TRAINING PROGRAM

## 2024-01-10 NOTE — TELEPHONE ENCOUNTER
I notified the patient and his daughter that the growing RLL has some increased uptake on his PET scan; no other areas of concern. They would like to obtain tissue for diagnosis if possible. I will see about getting him back in with Dr. Bro or her partner to discuss robotic bronch.

## 2024-01-10 NOTE — NURSING
Patient notified of the scheduled appointment with Dr. Bro for 01/17/24 at 1030am.  Patient agreeable to date and time.  Reminder mailed.

## 2024-01-17 ENCOUNTER — OFFICE VISIT (OUTPATIENT)
Dept: PULMONOLOGY | Facility: CLINIC | Age: 69
End: 2024-01-17
Payer: MEDICARE

## 2024-01-17 VITALS
OXYGEN SATURATION: 88 % | SYSTOLIC BLOOD PRESSURE: 145 MMHG | HEART RATE: 74 BPM | WEIGHT: 178.81 LBS | BODY MASS INDEX: 25.6 KG/M2 | TEMPERATURE: 98 F | HEIGHT: 70 IN | DIASTOLIC BLOOD PRESSURE: 81 MMHG

## 2024-01-17 DIAGNOSIS — C34.31 PRIMARY MALIGNANT NEOPLASM OF RIGHT LOWER LOBE OF LUNG: ICD-10-CM

## 2024-01-17 DIAGNOSIS — J43.2 CENTRILOBULAR EMPHYSEMA: ICD-10-CM

## 2024-01-17 DIAGNOSIS — R91.1 NODULE OF RIGHT LUNG: Primary | ICD-10-CM

## 2024-01-17 DIAGNOSIS — J96.90 RESPIRATORY FAILURE, UNSPECIFIED CHRONICITY, UNSPECIFIED WHETHER WITH HYPOXIA OR HYPERCAPNIA: ICD-10-CM

## 2024-01-17 DIAGNOSIS — C80.1 ADENOCARCINOMA: ICD-10-CM

## 2024-01-17 PROCEDURE — 99999 PR PBB SHADOW E&M-EST. PATIENT-LVL V: CPT | Mod: PBBFAC,,, | Performed by: INTERNAL MEDICINE

## 2024-01-17 PROCEDURE — 99214 OFFICE O/P EST MOD 30 MIN: CPT | Mod: S$GLB,,, | Performed by: INTERNAL MEDICINE

## 2024-01-17 NOTE — H&P (VIEW-ONLY)
"Subjective:       Patient ID: Lan Robles is a 68 y.o. male.    Chief Complaint: Pulmonary Nodules (Reconsult /)    This is a 68 y.o. 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 Biopsy of a RUL nodule, stations 7 and 11L/R nodes were negative for malignancy. PET/CT 9/6/22 demonstrated mild uptake in RLL nodule only. He completed definitive SBRT 55 Gy in 5 fx on 9/27/22.    History significant for high risk prostate cancer s/p EBRT 70 Gy in 28 fx +ADT with Dr. Villalobos completed 11/2020.        Of note, today in clinic complaining of JARQUIN.  Uses anoro daily for emphysema.  No change in cough.  No fevers, chills or night sweats.      Review of Systems   All other systems reviewed and are negative.    Objective:      Vitals:    01/17/24 1043   BP: (!) 145/81   BP Location: Right arm   Patient Position: Sitting   BP Method: Medium (Automatic)   Pulse: 74   Temp: 97.6 °F (36.4 °C)   TempSrc: Oral   SpO2: (!) 88%  Comment: room air   Weight: 81.1 kg (178 lb 12.7 oz)   Height: 5' 10" (1.778 m)      Physical Exam   Constitutional: He is oriented to person, place, and time. He appears well-developed and well-nourished.   HENT:   Head: Normocephalic.   Cardiovascular: Normal rate and regular rhythm.   Pulmonary/Chest: Normal expansion.   Musculoskeletal:         General: No edema. Normal range of motion.   Neurological: He is alert and oriented to person, place, and time.   Skin: Skin is warm and dry.   Psychiatric: He has a normal mood and affect.     Personal Diagnostic Review  Comparison of CT of chest 6/2023 to 12/29/23 enlarging and more conspicious RUL semisolid nodule and enlarging RLL nodule with tethering of the pleura.  EMphysema is present.           1/17/2024    10:43 AM 12/29/2023    11:09 AM 11/20/2023    10:45 AM 11/20/2023    10:22 AM 9/22/2023    10:26 AM 6/28/2023    10:47 AM 5/8/2023     9:54 AM   Pulmonary Function Tests   SpO2 88 % 94 % 95 % 91 " "% 91 % 95 % 95 %   Height 5' 10" (1.778 m) 5' 10" (1.778 m)  5' 10" (1.778 m) 5' 10" (1.778 m) 5' 10" (1.778 m) 5' 10" (1.778 m)   Weight 81.1 kg (178 lb 12.7 oz) 82.4 kg (181 lb 10.5 oz)  80.3 kg (177 lb 0.5 oz) 79.4 kg (175 lb 0.7 oz) 79.4 kg (175 lb 0.7 oz) 79.8 kg (175 lb 14.8 oz)   BMI (Calculated) 25.7 26.1  25.4 25.1 25.1 25.2         Assessment:       1. Nodule of right lung    2. Centrilobular emphysema    3. Respiratory failure, unspecified chronicity, unspecified whether with hypoxia or hypercapnia    4. Adenocarcinoma    5. Primary malignant neoplasm of right lower lobe of lung        Outpatient Encounter Medications as of 1/17/2024   Medication Sig Dispense Refill    albuterol (PROVENTIL/VENTOLIN HFA) 90 mcg/actuation inhaler Inhale 2 puffs into the lungs every 6 (six) hours as needed. 18 g 11    albuterol (PROVENTIL/VENTOLIN HFA) 90 mcg/actuation inhaler 1 puff as needed      ferrous sulfate (IRON, FERROUS SULFATE,) 325 mg (65 mg iron) Tab tablet Take 1 tablet (325 mg total) by mouth every other day. 45 tablet 1    fluticasone propionate (FLONASE) 50 mcg/actuation nasal spray       metFORMIN (GLUCOPHAGE) 1000 MG tablet       TRESIBA FLEXTOUCH U-100 100 unit/mL (3 mL) InPn INJECT 20 UNITS UNDER THE SKIN ONCE DAILY      umeclidinium-vilanteroL (ANORO ELLIPTA) 62.5-25 mcg/actuation DsDv Inhale 1 puff into the lungs once daily. Controller 1 each 5    BD BRENNA 2ND GEN PEN NEEDLE 32 gauge x 5/32" Ndle USE TO INJECT INSULIN UNDER THE SKIN EVERY DAY      bicalutamide (CASODEX) 50 MG Tab Take 1 tablet (50 mg total) by mouth once daily. 30 tablet 11    cholecalciferol, vitamin D3, 1,250 mcg (50,000 unit) capsule TAKE 1 CAPSULE BY MOUTH 1 TIME EVERY WEEK      levocetirizine (XYZAL) 5 MG tablet Take 1 tablet (5 mg total) by mouth every evening. (Patient not taking: Reported on 11/9/2022) 30 tablet 11    prednisoLONE acetate (PRED FORTE) 1 % DrpS SHAKE LIQUID AND INSTILL 1 DROP IN LEFT EYE FOUR TIMES " "DAILY AFTER SURGERY      simvastatin (ZOCOR) 40 MG tablet Take 1 tablet by mouth.      tamsulosin (FLOMAX) 0.4 mg Cap Take 1 capsule by mouth.       No facility-administered encounter medications on file as of 1/17/2024.     Orders Placed This Encounter   Procedures    OXYGEN FOR HOME USE     Order Specific Question:   Liter Flow     Answer:   2     Order Specific Question:   Duration     Answer:   Continuous     Order Specific Question:   Qualifying Test Performed at:     Answer:   Rest     Order Specific Question:   Oxygen saturation:     Answer:   88     Order Specific Question:   Portable mode:     Answer:   pulse dose acceptable     Order Specific Question:   Mode:     Answer:   Portable concentrator     Order Specific Question:   Route     Answer:   nasal cannula     Order Specific Question:   Device:     Answer:   home concentrator with portable concentrator     Order Specific Question:   Length of need (in months):     Answer:   99 mos     Order Specific Question:   Patient condition with qualifying saturation     Answer:   COPD     Order Specific Question:   Height:     Answer:   5' 10" (1.778 m)     Order Specific Question:   Weight:     Answer:   81.1 kg (178 lb 12.7 oz)     Order Specific Question:   Alternative treatment measures have been tried or considered and deemed clinically ineffective.     Answer:   Yes    Pulse Oximetry Q4H     Standing Status:   Standing     Number of Occurrences:   21    Case Request Operating Room: ROBOTIC BRONCHOSCOPY     Standing Status:   Standing     Number of Occurrences:   1     Order Specific Question:   Medical Necessity:     Answer:   Medically Urgent [101]     Order Specific Question:   CPT Code:     Answer:   [facility] BRONCHOSCOPY,BIOPSY [48181]     Order Specific Question:   CPT Code:     Answer:   NH BRONCH W/ EBUS, DIAG OR THERA INTERVENTION PERIPHERAL LESION(S), INCL GUID, ADD ON CODE [78559]     Order Specific Question:   CPT Code:     Answer:   [facility] BRONCH W/ " EBUS, SAMPLING 3 OR MORE NODES, INCL GUIDE [03089]     Order Specific Question:   CPT Code:     Answer:   [facility] BRONCHOSCOPY,COMPUTER ASSIST/IMAGE-GUIDED NAVIGATION [71757]     Order Specific Question:   Post-Procedure Disposition:     Answer:   Amb Surgery/DOSC [2]     Order Specific Question:   Is an on-site pathologist required for this procedure?     Answer:   N/A     Plan:     Problem List Items Addressed This Visit       Centrilobular emphysema    Overview      PFTs with obstruction.   controller with anoro daily.  Albuterol for rescue and prevention         Relevant Orders    OXYGEN FOR HOME USE    Primary malignant neoplasm of right lower lobe of lung    Current Assessment & Plan     Surveillance scan concerning for new disease with semisolid nodule now more conspicuous and enlarging RLL nodule abutting pleura    Robotic bronchoscopy planned for 2/2/23    I have explained the risks, benefits and alternatives of the procedure in detail.  The patient voices understanding and all questions have been answered.  The patient agrees to proceed as planned.           Other Visit Diagnoses       Nodule of right lung    -  Primary    Relevant Orders    Pulse Oximetry Q4H    Case Request Operating Room: ROBOTIC BRONCHOSCOPY (Completed)    Respiratory failure, unspecified chronicity, unspecified whether with hypoxia or hypercapnia        Relevant Orders    OXYGEN FOR HOME USE    Adenocarcinoma        Relevant Orders    Pulse Oximetry Q4H    Case Request Operating Room: ROBOTIC BRONCHOSCOPY (Completed)

## 2024-01-17 NOTE — PROGRESS NOTES
"Subjective:       Patient ID: Lan Robles is a 68 y.o. male.    Chief Complaint: Pulmonary Nodules (Reconsult /)    This is a 68 y.o. 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 Biopsy of a RUL nodule, stations 7 and 11L/R nodes were negative for malignancy. PET/CT 9/6/22 demonstrated mild uptake in RLL nodule only. He completed definitive SBRT 55 Gy in 5 fx on 9/27/22.    History significant for high risk prostate cancer s/p EBRT 70 Gy in 28 fx +ADT with Dr. Villalobos completed 11/2020.        Of note, today in clinic complaining of JARQUIN.  Uses anoro daily for emphysema.  No change in cough.  No fevers, chills or night sweats.      Review of Systems   All other systems reviewed and are negative.    Objective:      Vitals:    01/17/24 1043   BP: (!) 145/81   BP Location: Right arm   Patient Position: Sitting   BP Method: Medium (Automatic)   Pulse: 74   Temp: 97.6 °F (36.4 °C)   TempSrc: Oral   SpO2: (!) 88%  Comment: room air   Weight: 81.1 kg (178 lb 12.7 oz)   Height: 5' 10" (1.778 m)      Physical Exam   Constitutional: He is oriented to person, place, and time. He appears well-developed and well-nourished.   HENT:   Head: Normocephalic.   Cardiovascular: Normal rate and regular rhythm.   Pulmonary/Chest: Normal expansion.   Musculoskeletal:         General: No edema. Normal range of motion.   Neurological: He is alert and oriented to person, place, and time.   Skin: Skin is warm and dry.   Psychiatric: He has a normal mood and affect.     Personal Diagnostic Review  Comparison of CT of chest 6/2023 to 12/29/23 enlarging and more conspicious RUL semisolid nodule and enlarging RLL nodule with tethering of the pleura.  EMphysema is present.           1/17/2024    10:43 AM 12/29/2023    11:09 AM 11/20/2023    10:45 AM 11/20/2023    10:22 AM 9/22/2023    10:26 AM 6/28/2023    10:47 AM 5/8/2023     9:54 AM   Pulmonary Function Tests   SpO2 88 % 94 % 95 % 91 " "% 91 % 95 % 95 %   Height 5' 10" (1.778 m) 5' 10" (1.778 m)  5' 10" (1.778 m) 5' 10" (1.778 m) 5' 10" (1.778 m) 5' 10" (1.778 m)   Weight 81.1 kg (178 lb 12.7 oz) 82.4 kg (181 lb 10.5 oz)  80.3 kg (177 lb 0.5 oz) 79.4 kg (175 lb 0.7 oz) 79.4 kg (175 lb 0.7 oz) 79.8 kg (175 lb 14.8 oz)   BMI (Calculated) 25.7 26.1  25.4 25.1 25.1 25.2         Assessment:       1. Nodule of right lung    2. Centrilobular emphysema    3. Respiratory failure, unspecified chronicity, unspecified whether with hypoxia or hypercapnia    4. Adenocarcinoma    5. Primary malignant neoplasm of right lower lobe of lung        Outpatient Encounter Medications as of 1/17/2024   Medication Sig Dispense Refill    albuterol (PROVENTIL/VENTOLIN HFA) 90 mcg/actuation inhaler Inhale 2 puffs into the lungs every 6 (six) hours as needed. 18 g 11    albuterol (PROVENTIL/VENTOLIN HFA) 90 mcg/actuation inhaler 1 puff as needed      ferrous sulfate (IRON, FERROUS SULFATE,) 325 mg (65 mg iron) Tab tablet Take 1 tablet (325 mg total) by mouth every other day. 45 tablet 1    fluticasone propionate (FLONASE) 50 mcg/actuation nasal spray       metFORMIN (GLUCOPHAGE) 1000 MG tablet       TRESIBA FLEXTOUCH U-100 100 unit/mL (3 mL) InPn INJECT 20 UNITS UNDER THE SKIN ONCE DAILY      umeclidinium-vilanteroL (ANORO ELLIPTA) 62.5-25 mcg/actuation DsDv Inhale 1 puff into the lungs once daily. Controller 1 each 5    BD BRENNA 2ND GEN PEN NEEDLE 32 gauge x 5/32" Ndle USE TO INJECT INSULIN UNDER THE SKIN EVERY DAY      bicalutamide (CASODEX) 50 MG Tab Take 1 tablet (50 mg total) by mouth once daily. 30 tablet 11    cholecalciferol, vitamin D3, 1,250 mcg (50,000 unit) capsule TAKE 1 CAPSULE BY MOUTH 1 TIME EVERY WEEK      levocetirizine (XYZAL) 5 MG tablet Take 1 tablet (5 mg total) by mouth every evening. (Patient not taking: Reported on 11/9/2022) 30 tablet 11    prednisoLONE acetate (PRED FORTE) 1 % DrpS SHAKE LIQUID AND INSTILL 1 DROP IN LEFT EYE FOUR TIMES " "DAILY AFTER SURGERY      simvastatin (ZOCOR) 40 MG tablet Take 1 tablet by mouth.      tamsulosin (FLOMAX) 0.4 mg Cap Take 1 capsule by mouth.       No facility-administered encounter medications on file as of 1/17/2024.     Orders Placed This Encounter   Procedures    OXYGEN FOR HOME USE     Order Specific Question:   Liter Flow     Answer:   2     Order Specific Question:   Duration     Answer:   Continuous     Order Specific Question:   Qualifying Test Performed at:     Answer:   Rest     Order Specific Question:   Oxygen saturation:     Answer:   88     Order Specific Question:   Portable mode:     Answer:   pulse dose acceptable     Order Specific Question:   Mode:     Answer:   Portable concentrator     Order Specific Question:   Route     Answer:   nasal cannula     Order Specific Question:   Device:     Answer:   home concentrator with portable concentrator     Order Specific Question:   Length of need (in months):     Answer:   99 mos     Order Specific Question:   Patient condition with qualifying saturation     Answer:   COPD     Order Specific Question:   Height:     Answer:   5' 10" (1.778 m)     Order Specific Question:   Weight:     Answer:   81.1 kg (178 lb 12.7 oz)     Order Specific Question:   Alternative treatment measures have been tried or considered and deemed clinically ineffective.     Answer:   Yes    Pulse Oximetry Q4H     Standing Status:   Standing     Number of Occurrences:   21    Case Request Operating Room: ROBOTIC BRONCHOSCOPY     Standing Status:   Standing     Number of Occurrences:   1     Order Specific Question:   Medical Necessity:     Answer:   Medically Urgent [101]     Order Specific Question:   CPT Code:     Answer:   TN BRONCHOSCOPY,BIOPSY [45571]     Order Specific Question:   CPT Code:     Answer:   TN BRONCH W/ EBUS, DIAG OR THERA INTERVENTION PERIPHERAL LESION(S), INCL GUID, ADD ON CODE [01024]     Order Specific Question:   CPT Code:     Answer:   TN BRONCH W/ " EBUS, SAMPLING 3 OR MORE NODES, INCL GUIDE [53128]     Order Specific Question:   CPT Code:     Answer:   AK BRONCHOSCOPY,COMPUTER ASSIST/IMAGE-GUIDED NAVIGATION [78374]     Order Specific Question:   Post-Procedure Disposition:     Answer:   Amb Surgery/DOSC [2]     Order Specific Question:   Is an on-site pathologist required for this procedure?     Answer:   N/A     Plan:     Problem List Items Addressed This Visit       Centrilobular emphysema    Overview      PFTs with obstruction.   controller with anoro daily.  Albuterol for rescue and prevention         Relevant Orders    OXYGEN FOR HOME USE    Primary malignant neoplasm of right lower lobe of lung    Current Assessment & Plan     Surveillance scan concerning for new disease with semisolid nodule now more conspicuous and enlarging RLL nodule abutting pleura    Robotic bronchoscopy planned for 2/2/23    I have explained the risks, benefits and alternatives of the procedure in detail.  The patient voices understanding and all questions have been answered.  The patient agrees to proceed as planned.           Other Visit Diagnoses       Nodule of right lung    -  Primary    Relevant Orders    Pulse Oximetry Q4H    Case Request Operating Room: ROBOTIC BRONCHOSCOPY (Completed)    Respiratory failure, unspecified chronicity, unspecified whether with hypoxia or hypercapnia        Relevant Orders    OXYGEN FOR HOME USE    Adenocarcinoma        Relevant Orders    Pulse Oximetry Q4H    Case Request Operating Room: ROBOTIC BRONCHOSCOPY (Completed)

## 2024-01-22 ENCOUNTER — TELEPHONE (OUTPATIENT)
Dept: HEMATOLOGY/ONCOLOGY | Facility: CLINIC | Age: 69
End: 2024-01-22
Payer: MEDICARE

## 2024-01-22 PROBLEM — Z85.118 PERSONAL HISTORY OF LUNG CANCER: Status: RESOLVED | Noted: 2022-12-28 | Resolved: 2024-01-22

## 2024-01-22 NOTE — ASSESSMENT & PLAN NOTE
Surveillance scan concerning for new disease with semisolid nodule now more conspicuous and enlarging RLL nodule abutting pleura    Robotic bronchoscopy planned for 2/2/23    I have explained the risks, benefits and alternatives of the procedure in detail.  The patient voices understanding and all questions have been answered.  The patient agrees to proceed as planned.

## 2024-01-23 ENCOUNTER — TELEPHONE (OUTPATIENT)
Dept: PULMONOLOGY | Facility: CLINIC | Age: 69
End: 2024-01-23
Payer: MEDICARE

## 2024-01-23 NOTE — TELEPHONE ENCOUNTER
----- Message from Amari Dodson sent at 1/23/2024 11:18 AM CST -----  Regarding: Oxygem order  Contact: [phone]  Calling to speak with provider or nurse regarding info on his oxygen machine that should have been ordered. Please call and discuss with patient as soon as possible.

## 2024-01-23 NOTE — TELEPHONE ENCOUNTER
Spoke with patient, informed him that I have received his message. I advised patient that I have faxed his Oxygen order to [facility] and that he contact there office to check the status of his oxygen order. Pt verbalized that he understand.

## 2024-01-23 NOTE — TELEPHONE ENCOUNTER
Spoke with daughter regarding this phone call.   Phone call to patient to confirm appointment and instructions given in clinic for EBUS/Robotic procedure scheduled for 02/02/24 .   Patient instructed to arrive to the 2nd floor [facility] check in desk at 0700 on 02/02/24 with designated .  NPO after midnight the night prior to scheduled procedure .Patient daughter stated not currently taking any GLP1 or antiplatelet/anticoags.  Patient verbalizes understanding of all above instructions given.

## 2024-01-24 ENCOUNTER — TELEPHONE (OUTPATIENT)
Dept: PULMONOLOGY | Facility: CLINIC | Age: 69
End: 2024-01-24
Payer: MEDICARE

## 2024-01-24 NOTE — TELEPHONE ENCOUNTER
----- Message from Dilia Limon sent at 1/24/2024  2:00 PM CST -----  Regarding: status of oxygen  Contact: [phone]  Lan Robles calling regarding the status of his oxygen machine.

## 2024-01-24 NOTE — TELEPHONE ENCOUNTER
Attempted to contact pt in regards to his message but no one answered pt telephone and no voicemail has picked up to leave pt a message.

## 2024-01-25 ENCOUNTER — DOCUMENTATION ONLY (OUTPATIENT)
Dept: PULMONOLOGY | Facility: CLINIC | Age: 69
End: 2024-01-25
Payer: MEDICARE

## 2024-01-25 NOTE — PROGRESS NOTES
"Called and spoke with pt to follow up on oxygen order.  Pt states he has not received his oxygen. Called [facility] to inquire about oxygen order faxed for pt on 1/17/24. Informed by "Dena" that the paper work had been received but, since the merger of [facility] and  no authorization was needed.  Asked for clarification of those orders that no longer require authorization and was told to find on providers page.    Faxed to [facility] orders for pt oxygen.      "

## 2024-01-29 ENCOUNTER — TELEPHONE (OUTPATIENT)
Dept: PULMONOLOGY | Facility: CLINIC | Age: 69
End: 2024-01-29
Payer: MEDICARE

## 2024-01-29 NOTE — TELEPHONE ENCOUNTER
Call received from pt stating that he still does not have any oxygen yet, call placed to [facility], spoke to a representative who is showing that ordfer is still pending.  Explained to representative that pt has been ordered oxygen for two week now and still have not received it.  Nurse contact information given as representative has to investigate order and return call.

## 2024-01-31 ENCOUNTER — ANESTHESIA EVENT (OUTPATIENT)
Dept: SURGERY | Facility: HOSPITAL | Age: 69
End: 2024-01-31
Payer: MEDICARE

## 2024-01-31 ENCOUNTER — TELEPHONE (OUTPATIENT)
Dept: PULMONOLOGY | Facility: CLINIC | Age: 69
End: 2024-01-31
Payer: MEDICARE

## 2024-02-01 NOTE — ANESTHESIA PREPROCEDURE EVALUATION
[facility]  Anesthesia Pre-Operative Evaluation         Patient Name: Lan Robles  YOB: 1955  MRN: [id number]    SUBJECTIVE:     Pre-operative evaluation for Procedure(s) (LRB):  ROBOTIC BRONCHOSCOPY (N/A)     02/01/2024    Lan Robles is a 68 y.o. male w/ a significant PMHx of Stage IA3 (cT1c cN0 M0) RLL NSCLC (adeno) diagnosed on robotic bronch w/ EBUS 8/12/22. Surveillance scan concerning for new disease with semisolid nodule now more conspicuous and enlarging RLL nodule abutting pleura.     Patient now presents for the above procedure(s).    Echo Summary  No results found for this or any previous visit.       Prev airway:  Date/Time: 8/12/2022 8:56 AM  Performed by: Kilo Ndiaye CRNA  Authorized by: Kilo Ndiaye CRNA      Intubation:     Induction:  Intravenous    Intubated:  Postinduction    Mask Ventilation:  Easy mask    Attempts:  1    Attempted By:  CRNA    Method of Intubation:  Direct    Blade:  Martinez 2    Laryngeal View Grade: Grade I - full view of cords      Difficult Airway Encountered?: No      Complications:  None    Airway Device:  Oral endotracheal tube    Airway Device Size:  9.0    Style/Cuff Inflation:  Cuffed    Tube secured:  22    Secured at:  The lips    Placement Verified By:  Capnometry, Revisualization with laryngoscopy and other (see comments) (BBS)    Complicating Factors:  Large/floppy epiglottis    Findings Post-Intubation:  BS equal bilateral    LDA: None documented.       Drips: None documented.      Patient Active Problem List   Diagnosis    Prostate cancer    Smoker    Dyspnea    Pulmonary nodule    Centrilobular emphysema    Primary malignant neoplasm of right lower lobe of lung       Review of patient's allergies indicates:  No Known Allergies    Current Inpatient Medications:      No current facility-administered medications on file prior to encounter.     Current Outpatient Medications on File Prior to Encounter  "  Medication Sig Dispense Refill    albuterol (PROVENTIL/VENTOLIN HFA) 90 mcg/actuation inhaler Inhale 2 puffs into the lungs every 6 (six) hours as needed. 18 g 11    albuterol (PROVENTIL/VENTOLIN HFA) 90 mcg/actuation inhaler 1 puff as needed      BD BRENNA 2ND GEN PEN NEEDLE 32 gauge x 5/32" Ndle USE TO INJECT INSULIN UNDER THE SKIN EVERY DAY      bicalutamide (CASODEX) 50 MG Tab Take 1 tablet (50 mg total) by mouth once daily. 30 tablet 11    cholecalciferol, vitamin D3, 1,250 mcg (50,000 unit) capsule TAKE 1 CAPSULE BY MOUTH 1 TIME EVERY WEEK      ferrous sulfate (IRON, FERROUS SULFATE,) 325 mg (65 mg iron) Tab tablet Take 1 tablet (325 mg total) by mouth every other day. 45 tablet 1    fluticasone propionate (FLONASE) 50 mcg/actuation nasal spray       levocetirizine (XYZAL) 5 MG tablet Take 1 tablet (5 mg total) by mouth every evening. (Patient not taking: Reported on 11/9/2022) 30 tablet 11    metFORMIN (GLUCOPHAGE) 1000 MG tablet       prednisoLONE acetate (PRED FORTE) 1 % DrpS SHAKE LIQUID AND INSTILL 1 DROP IN LEFT EYE FOUR TIMES DAILY AFTER SURGERY      simvastatin (ZOCOR) 40 MG tablet Take 1 tablet by mouth.      tamsulosin (FLOMAX) 0.4 mg Cap Take 1 capsule by mouth.      TRESIBA FLEXTOUCH U-100 100 unit/mL (3 mL) InPn INJECT 20 UNITS UNDER THE SKIN ONCE DAILY      umeclidinium-vilanteroL (ANORO ELLIPTA) 62.5-25 mcg/actuation DsDv Inhale 1 puff into the lungs once daily. Controller 1 each 5       Past Surgical History:   Procedure Laterality Date    ROBOTIC BRONCHOSCOPY N/A 8/12/2022    Procedure: ROBOTIC BRONCHOSCOPY;  Surgeon: Laney Bro MD;  Location: [facility] OR [address] [place] [address];  Service: Pulmonary;  Laterality: N/A;       Social History:  Tobacco Use: Medium Risk (1/17/2024)    Patient History     Smoking Tobacco Use: Former     Smokeless Tobacco Use: Never     Passive Exposure: Not on file      Alcohol Use: Not on file        OBJECTIVE:     Vital Signs Range (Last 24H):         Significant Labs:  Lab " Results   Component Value Date    WBC 5.76 11/17/2023    HGB 13.3 (L) 11/17/2023    HCT 43.3 11/17/2023     11/17/2023    ALT 21 11/17/2023    AST 16 11/17/2023     11/17/2023    K 4.2 11/17/2023     11/17/2023    CREATININE 1.2 11/17/2023    BUN 15 11/17/2023    CO2 28 11/17/2023       Diagnostic Studies: No relevant studies.    EKG:   Results for orders placed or performed during the hospital encounter of 05/11/22   EKG 12-lead    Collection Time: 05/11/22 10:41 AM    Narrative    Test Reason : F17.200,R91.1,R06.00,    Vent. Rate : 060 BPM     Atrial Rate : 060 BPM     P-R Int : 146 ms          QRS Dur : 090 ms      QT Int : 448 ms       P-R-T Axes : 073 080 087 degrees     QTc Int : 448 ms    Normal sinus rhythm with sinus arrhythmia  Normal ECG  No previous ECGs available  Confirmed by Vu Dash MD ([id number]) on 5/11/2022 1:00:09 PM    Referred By: SHELLI GALLOWAY           Confirmed By:Vu Dash MD       2D ECHO:  TTE:  No results found for this or any previous visit.    AIDEN:  No results found for this or any previous visit.    ASSESSMENT/PLAN:         Pre-op Assessment    I have reviewed the Patient Summary Reports.     I have reviewed the Nursing Notes. I have reviewed the NPO Status.   I have reviewed the Medications.     Review of Systems  Anesthesia Hx:  No problems with previous Anesthesia   History of prior surgery of interest to airway management or planning:          Denies Family Hx of Anesthesia complications.    Denies Personal Hx of Anesthesia complications.                    Social:  Former Smoker       Hematology/Oncology:  Hematology Normal                       --  Cancer in past history:                     EENT/Dental:  EENT/Dental Normal           Cardiovascular:  Cardiovascular Normal                                            Pulmonary:   COPD   Shortness of breath                  Renal/:  Renal/ Normal                 Hepatic/GI:  Hepatic/GI Normal                  Musculoskeletal:  Musculoskeletal Normal                Neurological:  Neurology Normal                                      Endocrine:  Endocrine Normal            Dermatological:  Skin Normal    Psych:  Psychiatric Normal                    Physical Exam  General: Well nourished    Airway:  Mallampati: II   Mouth Opening: Normal  TM Distance: Normal  Tongue: Normal  Neck ROM: Normal ROM    Dental:  Intact    Chest/Lungs:  Clear to auscultation, Normal Respiratory Rate    Heart:  Rate: Normal  Rhythm: Regular Rhythm  Sounds: Normal    Abdomen:  Normal, Nontender        Anesthesia Plan  Type of Anesthesia, risks & benefits discussed:    Anesthesia Type: Gen ETT  Intra-op Monitoring Plan: Standard ASA Monitors  Post Op Pain Control Plan: multimodal analgesia  Induction:  IV  Airway Plan: Direct, Post-Induction  Informed Consent: Informed consent signed with the Patient and all parties understand the risks and agree with anesthesia plan.  All questions answered.   ASA Score: 3  Day of Surgery Review of History & Physical: H&P Update referred to the surgeon/provider.    Ready For Surgery From Anesthesia Perspective.     .

## 2024-02-02 ENCOUNTER — ANESTHESIA (OUTPATIENT)
Dept: SURGERY | Facility: HOSPITAL | Age: 69
End: 2024-02-02
Payer: MEDICARE

## 2024-02-02 ENCOUNTER — HOSPITAL ENCOUNTER (OUTPATIENT)
Facility: HOSPITAL | Age: 69
Discharge: HOME | End: 2024-02-02
Attending: INTERNAL MEDICINE | Admitting: INTERNAL MEDICINE
Payer: MEDICARE

## 2024-02-02 VITALS
BODY MASS INDEX: 24.11 KG/M2 | RESPIRATION RATE: 25 BRPM | WEIGHT: 168 LBS | TEMPERATURE: 98 F | HEART RATE: 67 BPM | SYSTOLIC BLOOD PRESSURE: 118 MMHG | DIASTOLIC BLOOD PRESSURE: 79 MMHG | OXYGEN SATURATION: 88 %

## 2024-02-02 DIAGNOSIS — R91.1 PULMONARY NODULE: Primary | ICD-10-CM

## 2024-02-02 DIAGNOSIS — C80.1 ADENOCARCINOMA: ICD-10-CM

## 2024-02-02 DIAGNOSIS — J43.2 CENTRILOBULAR EMPHYSEMA: ICD-10-CM

## 2024-02-02 DIAGNOSIS — R91.1 NODULE OF RIGHT LUNG: ICD-10-CM

## 2024-02-02 LAB
POCT GLUCOSE: 196 MG/DL (ref 70–110)
POCT GLUCOSE: 212 MG/DL (ref 70–110)

## 2024-02-02 PROCEDURE — 88177 CYTP FNA EVAL EA ADDL: CPT | Mod: 26,,, | Performed by: PATHOLOGY

## 2024-02-02 PROCEDURE — 88381 MICRODISSECTION MANUAL: CPT | Performed by: PATHOLOGY

## 2024-02-02 PROCEDURE — 37000009 HC ANESTHESIA EA ADD 15 MINS: Performed by: INTERNAL MEDICINE

## 2024-02-02 PROCEDURE — 31629 BRONCHOSCOPY/NEEDLE BX EACH: CPT | Mod: 59,RT,, | Performed by: INTERNAL MEDICINE

## 2024-02-02 PROCEDURE — 88172 CYTP DX EVAL FNA 1ST EA SITE: CPT | Mod: 26,,, | Performed by: PATHOLOGY

## 2024-02-02 PROCEDURE — D9220A PRA ANESTHESIA: Mod: ,,, | Performed by: ANESTHESIOLOGY

## 2024-02-02 PROCEDURE — 27201423 OPTIME MED/SURG SUP & DEVICES STERILE SUPPLY: Performed by: INTERNAL MEDICINE

## 2024-02-02 PROCEDURE — 88112 CYTOPATH CELL ENHANCE TECH: CPT | Mod: 26,59,, | Performed by: PATHOLOGY

## 2024-02-02 PROCEDURE — 71000044 HC DOSC ROUTINE RECOVERY FIRST HOUR: Performed by: INTERNAL MEDICINE

## 2024-02-02 PROCEDURE — 37000008 HC ANESTHESIA 1ST 15 MINUTES: Performed by: INTERNAL MEDICINE

## 2024-02-02 PROCEDURE — 88112 CYTOPATH CELL ENHANCE TECH: CPT | Performed by: PATHOLOGY

## 2024-02-02 PROCEDURE — 31632 BRONCHOSCOPY/LUNG BX ADDL: CPT | Mod: RT,,, | Performed by: INTERNAL MEDICINE

## 2024-02-02 PROCEDURE — 36000708 HC OR TIME LEV III 1ST 15 MIN: Performed by: INTERNAL MEDICINE

## 2024-02-02 PROCEDURE — 88360 TUMOR IMMUNOHISTOCHEM/MANUAL: CPT | Performed by: PATHOLOGY

## 2024-02-02 PROCEDURE — 88342 IMHCHEM/IMCYTCHM 1ST ANTB: CPT | Mod: 26,,, | Performed by: PATHOLOGY

## 2024-02-02 PROCEDURE — 31652 BRONCH EBUS SAMPLNG 1/2 NODE: CPT | Mod: GC,,, | Performed by: INTERNAL MEDICINE

## 2024-02-02 PROCEDURE — 36000709 HC OR TIME LEV III EA ADD 15 MIN: Performed by: INTERNAL MEDICINE

## 2024-02-02 PROCEDURE — C1726 CATH, BAL DIL, NON-VASCULAR: HCPCS | Performed by: INTERNAL MEDICINE

## 2024-02-02 PROCEDURE — 31633 BRONCHOSCOPY/NEEDLE BX ADDL: CPT | Mod: RT,,, | Performed by: INTERNAL MEDICINE

## 2024-02-02 PROCEDURE — 31628 BRONCHOSCOPY/LUNG BX EACH: CPT | Mod: 51,RT,, | Performed by: INTERNAL MEDICINE

## 2024-02-02 PROCEDURE — 88173 CYTOPATH EVAL FNA REPORT: CPT | Mod: 59 | Performed by: PATHOLOGY

## 2024-02-02 PROCEDURE — 88177 CYTP FNA EVAL EA ADDL: CPT | Performed by: PATHOLOGY

## 2024-02-02 PROCEDURE — 31624 DX BRONCHOSCOPE/LAVAGE: CPT | Mod: 51,RT,, | Performed by: INTERNAL MEDICINE

## 2024-02-02 PROCEDURE — 88173 CYTOPATH EVAL FNA REPORT: CPT | Mod: 26,,, | Performed by: PATHOLOGY

## 2024-02-02 PROCEDURE — 31654 BRONCH EBUS IVNTJ PERPH LES: CPT | Mod: ,,, | Performed by: INTERNAL MEDICINE

## 2024-02-02 PROCEDURE — 82962 GLUCOSE BLOOD TEST: CPT | Mod: 91 | Performed by: INTERNAL MEDICINE

## 2024-02-02 PROCEDURE — 81457 SO NEO GSAP DNA MCRSTL INS: CPT | Performed by: PATHOLOGY

## 2024-02-02 PROCEDURE — 82962 GLUCOSE BLOOD TEST: CPT | Performed by: INTERNAL MEDICINE

## 2024-02-02 PROCEDURE — 63600175 PHARM REV CODE 636 W HCPCS: Performed by: STUDENT IN AN ORGANIZED HEALTH CARE EDUCATION/TRAINING PROGRAM

## 2024-02-02 PROCEDURE — 88305 TISSUE EXAM BY PATHOLOGIST: CPT | Mod: 26,,, | Performed by: PATHOLOGY

## 2024-02-02 PROCEDURE — 88305 TISSUE EXAM BY PATHOLOGIST: CPT | Mod: 59 | Performed by: PATHOLOGY

## 2024-02-02 PROCEDURE — 71000015 HC POSTOP RECOV 1ST HR: Performed by: INTERNAL MEDICINE

## 2024-02-02 PROCEDURE — 88172 CYTP DX EVAL FNA 1ST EA SITE: CPT | Performed by: PATHOLOGY

## 2024-02-02 PROCEDURE — 88342 IMHCHEM/IMCYTCHM 1ST ANTB: CPT | Performed by: PATHOLOGY

## 2024-02-02 PROCEDURE — 31627 NAVIGATIONAL BRONCHOSCOPY: CPT | Mod: ,,, | Performed by: INTERNAL MEDICINE

## 2024-02-02 PROCEDURE — 25000003 PHARM REV CODE 250: Performed by: STUDENT IN AN ORGANIZED HEALTH CARE EDUCATION/TRAINING PROGRAM

## 2024-02-02 RX ORDER — FENTANYL CITRATE 50 UG/ML
INJECTION, SOLUTION INTRAMUSCULAR; INTRAVENOUS
Status: DISCONTINUED | OUTPATIENT
Start: 2024-02-02 | End: 2024-02-02

## 2024-02-02 RX ORDER — DEXAMETHASONE SODIUM PHOSPHATE 4 MG/ML
INJECTION, SOLUTION INTRA-ARTICULAR; INTRALESIONAL; INTRAMUSCULAR; INTRAVENOUS; SOFT TISSUE
Status: DISCONTINUED | OUTPATIENT
Start: 2024-02-02 | End: 2024-02-02

## 2024-02-02 RX ORDER — ROCURONIUM BROMIDE 10 MG/ML
INJECTION, SOLUTION INTRAVENOUS
Status: DISCONTINUED | OUTPATIENT
Start: 2024-02-02 | End: 2024-02-02

## 2024-02-02 RX ORDER — ONDANSETRON HYDROCHLORIDE 2 MG/ML
INJECTION, SOLUTION INTRAVENOUS
Status: DISCONTINUED | OUTPATIENT
Start: 2024-02-02 | End: 2024-02-02

## 2024-02-02 RX ORDER — SODIUM CHLORIDE 0.9 % (FLUSH) 0.9 %
10 SYRINGE (ML) INJECTION
Status: DISCONTINUED | OUTPATIENT
Start: 2024-02-02 | End: 2024-02-02 | Stop reason: HOSPADM

## 2024-02-02 RX ORDER — PROPOFOL 10 MG/ML
VIAL (ML) INTRAVENOUS
Status: DISCONTINUED | OUTPATIENT
Start: 2024-02-02 | End: 2024-02-02

## 2024-02-02 RX ORDER — FENTANYL CITRATE 50 UG/ML
25 INJECTION, SOLUTION INTRAMUSCULAR; INTRAVENOUS EVERY 5 MIN PRN
Status: DISCONTINUED | OUTPATIENT
Start: 2024-02-02 | End: 2024-02-02 | Stop reason: HOSPADM

## 2024-02-02 RX ORDER — PHENYLEPHRINE HYDROCHLORIDE 10 MG/ML
INJECTION INTRAVENOUS
Status: DISCONTINUED | OUTPATIENT
Start: 2024-02-02 | End: 2024-02-02

## 2024-02-02 RX ORDER — HALOPERIDOL 5 MG/ML
0.5 INJECTION INTRAMUSCULAR EVERY 10 MIN PRN
Status: DISCONTINUED | OUTPATIENT
Start: 2024-02-02 | End: 2024-02-02 | Stop reason: HOSPADM

## 2024-02-02 RX ORDER — LIDOCAINE HYDROCHLORIDE 20 MG/ML
INJECTION, SOLUTION EPIDURAL; INFILTRATION; INTRACAUDAL; PERINEURAL
Status: DISCONTINUED | OUTPATIENT
Start: 2024-02-02 | End: 2024-02-02

## 2024-02-02 RX ADMIN — PHENYLEPHRINE HYDROCHLORIDE 100 MCG: 10 INJECTION INTRAVENOUS at 09:02

## 2024-02-02 RX ADMIN — LIDOCAINE HYDROCHLORIDE 100 MG: 20 INJECTION, SOLUTION EPIDURAL; INFILTRATION; INTRACAUDAL; PERINEURAL at 09:02

## 2024-02-02 RX ADMIN — ONDANSETRON 4 MG: 2 INJECTION INTRAMUSCULAR; INTRAVENOUS at 10:02

## 2024-02-02 RX ADMIN — DEXAMETHASONE SODIUM PHOSPHATE 4 MG: 4 INJECTION, SOLUTION INTRAMUSCULAR; INTRAVENOUS at 09:02

## 2024-02-02 RX ADMIN — FENTANYL CITRATE 100 MCG: 50 INJECTION, SOLUTION INTRAMUSCULAR; INTRAVENOUS at 09:02

## 2024-02-02 RX ADMIN — ROCURONIUM BROMIDE 50 MG: 10 INJECTION, SOLUTION INTRAVENOUS at 09:02

## 2024-02-02 RX ADMIN — SODIUM CHLORIDE: 0.9 INJECTION, SOLUTION INTRAVENOUS at 09:02

## 2024-02-02 RX ADMIN — ROCURONIUM BROMIDE 10 MG: 10 INJECTION, SOLUTION INTRAVENOUS at 09:02

## 2024-02-02 RX ADMIN — SUGAMMADEX 400 MG: 100 INJECTION, SOLUTION INTRAVENOUS at 10:02

## 2024-02-02 RX ADMIN — PROPOFOL 150 MG: 10 INJECTION, EMULSION INTRAVENOUS at 09:02

## 2024-02-02 NOTE — DISCHARGE SUMMARY
[facility] - [facility] (2nd Fl)  Discharge Note  Short Stay    Procedure(s) (LRB):  ROBOTIC BRONCHOSCOPY (N/A)      OUTCOME: Patient tolerated treatment/procedure well without complication and is now ready for discharge.    DISPOSITION: Home or Self Care    FINAL DIAGNOSIS:  <principal problem not specified>    FOLLOWUP: In clinic    DISCHARGE INSTRUCTIONS:  No discharge procedures on file.     TIME SPENT ON DISCHARGE: 15 minutes    Marie Rodriguez DO  [employer] PCCM Fellow

## 2024-02-02 NOTE — INTERVAL H&P NOTE
The patient has been examined and the H&P has been reviewed:    I concur with the findings and no changes have occurred since H&P was written.    Anesthesia/Surgery risks, benefits and alternative options discussed and understood by patient/family.        RUL atypical pulmonary cyst and RLL nodule.  Robotic and staging bronchoscopy with EBUS  I have explained the risks, benefits and alternatives of the procedure in detail.  The patient voices understanding and all questions have been answered.  The patient agrees to proceed as planned.

## 2024-02-02 NOTE — ANESTHESIA PROCEDURE NOTES
Intubation    Date/Time: 2/2/2024 9:10 AM    Performed by: Desiree Wilson MD  Authorized by: Phu Treviño MD    Intubation:     Induction:  Intravenous    Intubated:  Postinduction    Mask Ventilation:  Easy with oral airway    Attempts:  1    Attempted By:  Resident anesthesiologist    Method of Intubation:  Direct    Blade:  Martinez 2    Laryngeal View Grade: Grade I - full view of cords      Difficult Airway Encountered?: No      Complications:  None    Airway Device:  Oral endotracheal tube    Airway Device Size:  8.5    Style/Cuff Inflation:  Cuffed    Inflation Amount (mL):  8    Tube secured:  21    Secured at:  The lips    Placement Verified By:  Capnometry    Complicating Factors:  None    Findings Post-Intubation:  Atraumatic/condition of teeth unchanged

## 2024-02-02 NOTE — ANESTHESIA POSTPROCEDURE EVALUATION
Anesthesia Post Evaluation    Patient: Lan Robles    Procedure(s) Performed: Procedure(s) (LRB):  ROBOTIC BRONCHOSCOPY (N/A)    Final Anesthesia Type: general      Patient location during evaluation: PACU  Patient participation: Yes- Able to Participate  Level of consciousness: awake and alert  Post-procedure vital signs: reviewed and stable  Pain management: adequate  Airway patency: patent    PONV status at discharge: No PONV  Anesthetic complications: no      Cardiovascular status: blood pressure returned to baseline  Respiratory status: unassisted  Follow-up not needed.              Vitals Value Taken Time   /79 02/02/24 1132   Temp 36.8 °C (98.2 °F) 02/02/24 1130   Pulse 65 02/02/24 1134   Resp 28 02/02/24 1134   SpO2 91 % 02/02/24 1134   Vitals shown include unvalidated device data.      No case tracking events are documented in the log.      Pain/Delroy Score: Delroy Score: 7 (2/2/2024 11:00 AM)

## 2024-02-02 NOTE — TRANSFER OF CARE
Anesthesia Transfer of Care Note    Patient: Lan Robles    Procedure(s) Performed: Procedure(s) (LRB):  ROBOTIC BRONCHOSCOPY (N/A)    Patient location: PACU    Anesthesia Type: general    Transport from OR: Transported from OR on 6-10 L/min O2 by face mask with adequate spontaneous ventilation    Post pain: adequate analgesia    Post assessment: no apparent anesthetic complications    Post vital signs: stable    Level of consciousness: awake, alert and oriented    Nausea/Vomiting: no nausea/vomiting    Complications: none    Transfer of care protocol was followed      Last vitals: Visit Vitals  BP (!) 172/98 (BP Location: Right arm, Patient Position: Lying)   Pulse 80   Temp 36.7 °C (98.1 °F) (Temporal)   Resp (!) 22   Wt 76.2 kg (168 lb)   SpO2 (!) 93%   BMI 24.11 kg/m²

## 2024-02-02 NOTE — PLAN OF CARE
Chart reviewed. Preop nursing care completed per orders. Safe surgery checklist complete aside from H&P update. Daughter at bedside and to take wallet. Clothing placed in postop locker. Call bell within reach. Instructed pt to call for assistance.

## 2024-02-07 ENCOUNTER — TELEPHONE (OUTPATIENT)
Dept: PULMONOLOGY | Facility: CLINIC | Age: 69
End: 2024-02-07

## 2024-02-08 ENCOUNTER — TELEPHONE (OUTPATIENT)
Dept: RADIATION ONCOLOGY | Facility: CLINIC | Age: 69
End: 2024-02-08
Payer: MEDICARE

## 2024-02-08 NOTE — TELEPHONE ENCOUNTER
Called and spoke to Claire and Mr Robles.  RUL 2 cm semisolid lesion is positive for lung cancer, adenocarcinoma.  The RLL solid, hypermetabolic lesion was non diagnostic. Previously radiated RLL for adenocarcinoma of lung.  Concern for recurrence.  Lymph nodes were negative.  Messaged Dr Pedraza and other members of thoracic multiD team to weigh in and how to best approace his care.

## 2024-02-15 ENCOUNTER — HOSPITAL ENCOUNTER (OUTPATIENT)
Dept: RADIATION THERAPY | Facility: HOSPITAL | Age: 69
Discharge: HOME | End: 2024-02-15
Attending: INTERNAL MEDICINE
Payer: MEDICARE

## 2024-02-15 ENCOUNTER — OFFICE VISIT (OUTPATIENT)
Dept: RADIATION ONCOLOGY | Facility: CLINIC | Age: 69
End: 2024-02-15
Payer: MEDICARE

## 2024-02-15 VITALS
SYSTOLIC BLOOD PRESSURE: 159 MMHG | BODY MASS INDEX: 33.17 KG/M2 | DIASTOLIC BLOOD PRESSURE: 95 MMHG | HEART RATE: 80 BPM | WEIGHT: 180.25 LBS | OXYGEN SATURATION: 98 % | HEIGHT: 62 IN

## 2024-02-15 DIAGNOSIS — C34.11 PRIMARY MALIGNANT NEOPLASM OF RIGHT UPPER LOBE OF LUNG: Primary | ICD-10-CM

## 2024-02-15 PROCEDURE — 77334 RADIATION TREATMENT AID(S): CPT | Mod: 26,,, | Performed by: RADIOLOGY

## 2024-02-15 PROCEDURE — 99215 OFFICE O/P EST HI 40 MIN: CPT | Mod: 25,S$GLB,, | Performed by: RADIOLOGY

## 2024-02-15 PROCEDURE — 77014 PR  CT GUIDANCE PLACEMENT RAD THERAPY FIELDS: CPT | Mod: 26,,, | Performed by: RADIOLOGY

## 2024-02-15 PROCEDURE — 77014 HC CT GUIDANCE RADIATION THERAPY FLDS PLACEMENT: CPT | Mod: TC | Performed by: RADIOLOGY

## 2024-02-15 PROCEDURE — 77334 RADIATION TREATMENT AID(S): CPT | Mod: TC | Performed by: RADIOLOGY

## 2024-02-15 PROCEDURE — 77263 THER RADIOLOGY TX PLNG CPLX: CPT | Mod: ,,, | Performed by: RADIOLOGY

## 2024-02-15 PROCEDURE — 99999 PR PBB SHADOW E&M-EST. PATIENT-LVL III: CPT | Mod: PBBFAC,,, | Performed by: RADIOLOGY

## 2024-02-15 NOTE — PROGRESS NOTES
2/15/2024    Radiation Oncology Follow-Up Visit      Prior Radiation History:   Course 1:  Treatment Site  Treatment Fields and Beam Energy  Dose/Fx (Gy)  #Fx  Total Dose (Gy)  Start Date  End Date    Prostate and pelvic nodes  VMAT with 6 MV photons  2.5  28 / 28  70  10/1/2020  11/10/2020      Course 2:    Site  Technique  Energy  Dose/Fx (Gy)  #Fx  Total Dose (Gy)  Start Date  End Date  Elapsed Days    RLL Lung  SBRT  6X  11  5 / 5  55  9/21/2022 9/27/2022  6        Is the patient female between ages 15-55:  no    Does the patient have a CIED:  no      Assessment   This is a 68 y.o. 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 Biopsy of a RUL nodule, stations 7 and 11L/R nodes were negative for malignancy. PET/CT 9/6/22 demonstrated mild uptake in RLL nodule only. He completed definitive SBRT 55 Gy in 5 fx on 9/27/22.  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       PET/CT 1/10/24 with growing RLL nodule (SUV 3.1) and noted RUL GGO. He underwent robotic bronch w/ EBUS 2/2/24 by Dr. Bro. Biopsy of the FDG avid RLL nodule was negative for malignancy; however, a 2.0 cm GGO in the RUL revealed lepidic adenocarcinoma. He returns for consideration of treatment options.    History significant for high risk prostate cancer s/p EBRT 70 Gy in 28 fx +ADT with Dr. Villalobos completed 11/2020.     I recommend treating the confirmed RUL adenocarcinoma to 50 Gy in 4 fractions, which offers local control in 90% of patients. Potential short- and long-term risks of treatment were reviewed with the patient, particularly pneumonitis and chest wall pain.         Plan   1) CT Simulation today for SBRT lung treatment planning.            CHIEF COMPLAINT: F/U after SBRT for lung cancer    HPI/Focused ROS:  Since his last visit in clinic, PET/CT 1/10/24 demonstrated increased FDG avidity (SUV 3.1) in the growing RLL nodule; previously  treated RLL region without increased SUV suggestive of post-radiation change. He underwent robotic bronch w/ EBUS 24 by Dr. Bro. Biopsy of the FDG avid RLL nodule was negative for malignancy; however, a 2.0 cm GGO in the RUL revealed lepidic adenocarcinoma. He returns for consideration of treatment options.     Doing well after bronch; Dr. Bro has placed him on home oxygen, which he reports helps with dyspnea on exertion. Denies chest pain or cough.       Past Medical History:   Diagnosis Date    COPD (chronic obstructive pulmonary disease)     Diabetes mellitus     Prostate cancer        Past Surgical History:   Procedure Laterality Date    ROBOTIC BRONCHOSCOPY N/A 2022    Procedure: ROBOTIC BRONCHOSCOPY;  Surgeon: Laney Bro MD;  Location: [facility] OR [address] [place] [address];  Service: Pulmonary;  Laterality: N/A;    ROBOTIC BRONCHOSCOPY N/A 2024    Procedure: ROBOTIC BRONCHOSCOPY;  Surgeon: aLney Bro MD;  Location: [facility] OR [address] [place] [address];  Service: Pulmonary;  Laterality: N/A;       Social History     Tobacco Use    Smoking status: Former     Current packs/day: 0.00     Types: Cigarettes     Start date:      Quit date: 2022     Years since quittin.8    Smokeless tobacco: Never   Substance Use Topics    Alcohol use: Yes    Drug use: Never       Cancer-related family history includes Cancer in his father. There is no history of Melanoma.    Current Outpatient Medications on File Prior to Visit   Medication Sig Dispense Refill    albuterol (PROVENTIL/VENTOLIN HFA) 90 mcg/actuation inhaler Inhale 2 puffs into the lungs every 6 (six) hours as needed. 18 g 11    albuterol (PROVENTIL/VENTOLIN HFA) 90 mcg/actuation inhaler 1 puff as needed      fluticasone propionate (FLONASE) 50 mcg/actuation nasal spray       metFORMIN (GLUCOPHAGE) 1000 MG tablet       TRESIBA FLEXTOUCH U-100 100 unit/mL (3 mL) InPn INJECT 20 UNITS UNDER THE SKIN ONCE DAILY      umeclidinium-vilanteroL (ANORO ELLIPTA) 62.5-25 mcg/actuation  "DsDv Inhale 1 puff into the lungs once daily. Controller 1 each 5    BD BRENNA 2ND GEN PEN NEEDLE 32 gauge x 5/32" Ndle USE TO INJECT INSULIN UNDER THE SKIN EVERY DAY      bicalutamide (CASODEX) 50 MG Tab Take 1 tablet (50 mg total) by mouth once daily. 30 tablet 11    cholecalciferol, vitamin D3, 1,250 mcg (50,000 unit) capsule TAKE 1 CAPSULE BY MOUTH 1 TIME EVERY WEEK      ferrous sulfate (IRON, FERROUS SULFATE,) 325 mg (65 mg iron) Tab tablet Take 1 tablet (325 mg total) by mouth every other day. (Patient not taking: Reported on 2/15/2024) 45 tablet 1    levocetirizine (XYZAL) 5 MG tablet Take 1 tablet (5 mg total) by mouth every evening. (Patient not taking: Reported on 11/9/2022) 30 tablet 11    prednisoLONE acetate (PRED FORTE) 1 % DrpS SHAKE LIQUID AND INSTILL 1 DROP IN LEFT EYE FOUR TIMES DAILY AFTER SURGERY      simvastatin (ZOCOR) 40 MG tablet Take 1 tablet by mouth.      tamsulosin (FLOMAX) 0.4 mg Cap Take 1 capsule by mouth.       No current facility-administered medications on file prior to visit.       Review of patient's allergies indicates:  No Known Allergies      Vital Signs: BP (!) 159/95   Pulse 80   Ht 5' 2" (1.575 m)   Wt 81.8 kg (180 lb 4 oz)   SpO2 98%   BMI 32.97 kg/m²     ECOG Performance Status: 1 - Ambulates, capable of light work    Physical Exam  Vitals reviewed.   Constitutional:       Appearance: Normal appearance.   HENT:      Head: Normocephalic and atraumatic.   Eyes:      General: No scleral icterus.     Extraocular Movements: Extraocular movements intact.   Pulmonary:      Effort: Pulmonary effort is normal. No respiratory distress.   Abdominal:      General: There is no distension.   Musculoskeletal:      Cervical back: Neck supple.   Lymphadenopathy:      Cervical: No cervical adenopathy.   Skin:     General: Skin is dry.      Coloration: Skin is not jaundiced.   Neurological:      General: No focal deficit present.      Mental Status: He is alert and oriented to person, " place, and time.      Cranial Nerves: No cranial nerve deficit.   Psychiatric:         Mood and Affect: Mood normal.         Behavior: Behavior normal.         Judgment: Judgment normal.          Labs:    Imaging: I have personally reviewed the patient's available images and reports and summarized pertinent findings above in HPI.     Pathology: No new path

## 2024-02-21 PROCEDURE — 77293 RESPIRATOR MOTION MGMT SIMUL: CPT | Mod: TC | Performed by: RADIOLOGY

## 2024-02-21 PROCEDURE — 77301 RADIOTHERAPY DOSE PLAN IMRT: CPT | Mod: 26,,, | Performed by: RADIOLOGY

## 2024-02-21 PROCEDURE — 77301 RADIOTHERAPY DOSE PLAN IMRT: CPT | Mod: TC | Performed by: RADIOLOGY

## 2024-02-21 PROCEDURE — 77293 RESPIRATOR MOTION MGMT SIMUL: CPT | Mod: 26,,, | Performed by: RADIOLOGY

## 2024-02-22 PROCEDURE — 77338 DESIGN MLC DEVICE FOR IMRT: CPT | Mod: 26,,, | Performed by: RADIOLOGY

## 2024-02-22 PROCEDURE — 77300 RADIATION THERAPY DOSE PLAN: CPT | Mod: 26,,, | Performed by: RADIOLOGY

## 2024-02-22 PROCEDURE — 77470 SPECIAL RADIATION TREATMENT: CPT | Mod: 59,TC | Performed by: RADIOLOGY

## 2024-02-22 PROCEDURE — 77338 DESIGN MLC DEVICE FOR IMRT: CPT | Mod: TC | Performed by: RADIOLOGY

## 2024-02-22 PROCEDURE — 77470 SPECIAL RADIATION TREATMENT: CPT | Mod: 26,59,, | Performed by: RADIOLOGY

## 2024-02-22 PROCEDURE — 77300 RADIATION THERAPY DOSE PLAN: CPT | Mod: TC | Performed by: RADIOLOGY

## 2024-02-22 PROCEDURE — 77370 RADIATION PHYSICS CONSULT: CPT | Performed by: RADIOLOGY

## 2024-02-26 PROCEDURE — 77014 PR  CT GUIDANCE PLACEMENT RAD THERAPY FIELDS: CPT | Mod: 26,,, | Performed by: RADIOLOGY

## 2024-02-26 PROCEDURE — 77373 STRTCTC BDY RAD THER TX DLVR: CPT | Performed by: RADIOLOGY

## 2024-02-28 PROCEDURE — 77373 STRTCTC BDY RAD THER TX DLVR: CPT | Performed by: RADIOLOGY

## 2024-02-28 PROCEDURE — 77014 PR  CT GUIDANCE PLACEMENT RAD THERAPY FIELDS: CPT | Mod: 26,,, | Performed by: RADIOLOGY

## 2024-03-01 ENCOUNTER — HOSPITAL ENCOUNTER (OUTPATIENT)
Dept: RADIATION THERAPY | Facility: HOSPITAL | Age: 69
Discharge: HOME | End: 2024-03-01
Attending: RADIOLOGY
Payer: MEDICARE

## 2024-03-01 PROCEDURE — 77014 PR  CT GUIDANCE PLACEMENT RAD THERAPY FIELDS: CPT | Mod: 26,,, | Performed by: RADIOLOGY

## 2024-03-01 PROCEDURE — 77373 STRTCTC BDY RAD THER TX DLVR: CPT | Performed by: RADIOLOGY

## 2024-03-04 PROCEDURE — 77373 STRTCTC BDY RAD THER TX DLVR: CPT | Performed by: RADIOLOGY

## 2024-03-04 PROCEDURE — 77014 PR  CT GUIDANCE PLACEMENT RAD THERAPY FIELDS: CPT | Mod: 26,,, | Performed by: RADIOLOGY

## 2024-03-05 ENCOUNTER — TELEPHONE (OUTPATIENT)
Dept: UROLOGY | Facility: CLINIC | Age: 69
End: 2024-03-05
Payer: MEDICARE

## 2024-03-05 DIAGNOSIS — N28.89 RENAL MASS: Primary | ICD-10-CM

## 2024-03-05 DIAGNOSIS — Z12.5 PROSTATE CANCER SCREENING: ICD-10-CM

## 2024-03-05 PROCEDURE — 77336 RADIATION PHYSICS CONSULT: CPT | Performed by: RADIOLOGY

## 2024-03-05 NOTE — TELEPHONE ENCOUNTER
Orders are in- LVM for the pt's daughter.     ----- Message from RT Paramjit sent at 3/5/2024  8:07 AM CST -----  Regarding: Lab Orders  Good Morning,   Please order a metabolic panel for this patient prior to their CT scan on Thursday, March 7, 2024. His appt is for 9am. Please notify patient to have labs drawn at [facility] on the 2nd floor either before the scan date of 3/7/2024 or 1-1/2 hrs prior to the scan time. Thank You.

## 2024-03-07 ENCOUNTER — HOSPITAL ENCOUNTER (OUTPATIENT)
Dept: RADIOLOGY | Facility: OTHER | Age: 69
Discharge: HOME | End: 2024-03-07
Attending: NURSE PRACTITIONER
Payer: MEDICARE

## 2024-03-07 DIAGNOSIS — N28.89 RENAL MASS: ICD-10-CM

## 2024-03-07 PROCEDURE — 74178 CT ABD&PLV WO CNTR FLWD CNTR: CPT | Mod: TC

## 2024-03-07 PROCEDURE — 71046 X-RAY EXAM CHEST 2 VIEWS: CPT | Mod: TC,FY

## 2024-03-07 PROCEDURE — 74178 CT ABD&PLV WO CNTR FLWD CNTR: CPT | Mod: 26,,, | Performed by: RADIOLOGY

## 2024-03-07 PROCEDURE — 71046 X-RAY EXAM CHEST 2 VIEWS: CPT | Mod: 26,,, | Performed by: RADIOLOGY

## 2024-03-07 PROCEDURE — 25500020 PHARM REV CODE 255: Performed by: NURSE PRACTITIONER

## 2024-03-07 RX ADMIN — IOHEXOL 100 ML: 350 INJECTION, SOLUTION INTRAVENOUS at 10:03

## 2024-03-13 DIAGNOSIS — C34.11 PRIMARY MALIGNANT NEOPLASM OF RIGHT UPPER LOBE OF LUNG: Primary | ICD-10-CM

## 2024-03-15 ENCOUNTER — OFFICE VISIT (OUTPATIENT)
Dept: UROLOGY | Facility: CLINIC | Age: 69
End: 2024-03-15
Payer: MEDICARE

## 2024-03-15 VITALS
HEIGHT: 62 IN | SYSTOLIC BLOOD PRESSURE: 141 MMHG | DIASTOLIC BLOOD PRESSURE: 90 MMHG | BODY MASS INDEX: 32.76 KG/M2 | OXYGEN SATURATION: 100 % | RESPIRATION RATE: 12 BRPM | HEART RATE: 79 BPM | WEIGHT: 178 LBS

## 2024-03-15 DIAGNOSIS — N28.89 RENAL MASS: Primary | ICD-10-CM

## 2024-03-15 PROCEDURE — 99214 OFFICE O/P EST MOD 30 MIN: CPT | Mod: S$GLB,,, | Performed by: UROLOGY

## 2024-06-12 ENCOUNTER — HOSPITAL ENCOUNTER (OUTPATIENT)
Dept: RADIOLOGY | Facility: HOSPITAL | Age: 69
Discharge: HOME | End: 2024-06-12
Attending: RADIOLOGY
Payer: MEDICARE

## 2024-06-12 ENCOUNTER — OFFICE VISIT (OUTPATIENT)
Dept: RADIATION ONCOLOGY | Facility: CLINIC | Age: 69
End: 2024-06-12
Payer: MEDICARE

## 2024-06-12 VITALS
WEIGHT: 188.25 LBS | SYSTOLIC BLOOD PRESSURE: 140 MMHG | HEART RATE: 86 BPM | DIASTOLIC BLOOD PRESSURE: 82 MMHG | HEIGHT: 70 IN | TEMPERATURE: 98 F | OXYGEN SATURATION: 91 % | BODY MASS INDEX: 26.95 KG/M2 | RESPIRATION RATE: 20 BRPM

## 2024-06-12 DIAGNOSIS — C34.11 PRIMARY MALIGNANT NEOPLASM OF RIGHT UPPER LOBE OF LUNG: Primary | ICD-10-CM

## 2024-06-12 DIAGNOSIS — C34.11 PRIMARY MALIGNANT NEOPLASM OF RIGHT UPPER LOBE OF LUNG: ICD-10-CM

## 2024-06-12 PROCEDURE — 1126F AMNT PAIN NOTED NONE PRSNT: CPT | Mod: CPTII,S$GLB,, | Performed by: RADIOLOGY

## 2024-06-12 PROCEDURE — 3008F BODY MASS INDEX DOCD: CPT | Mod: CPTII,S$GLB,, | Performed by: RADIOLOGY

## 2024-06-12 PROCEDURE — 99213 OFFICE O/P EST LOW 20 MIN: CPT | Mod: S$GLB,,, | Performed by: RADIOLOGY

## 2024-06-12 PROCEDURE — 99999 PR PBB SHADOW E&M-EST. PATIENT-LVL IV: CPT | Mod: PBBFAC,,, | Performed by: RADIOLOGY

## 2024-06-12 PROCEDURE — 1159F MED LIST DOCD IN RCRD: CPT | Mod: CPTII,S$GLB,, | Performed by: RADIOLOGY

## 2024-06-12 PROCEDURE — 71250 CT THORAX DX C-: CPT | Mod: TC

## 2024-06-12 PROCEDURE — 3077F SYST BP >= 140 MM HG: CPT | Mod: CPTII,S$GLB,, | Performed by: RADIOLOGY

## 2024-06-12 PROCEDURE — 71250 CT THORAX DX C-: CPT | Mod: 26,,, | Performed by: RADIOLOGY

## 2024-06-12 PROCEDURE — 3079F DIAST BP 80-89 MM HG: CPT | Mod: CPTII,S$GLB,, | Performed by: RADIOLOGY

## 2024-06-12 RX ORDER — HYDROXYZINE HYDROCHLORIDE 25 MG/1
TABLET, FILM COATED ORAL
COMMUNITY

## 2024-06-13 NOTE — PROGRESS NOTES
6/12/2024    Radiation Oncology Follow-Up Visit      Prior Radiation History:   Course 1:  Treatment Site  Treatment Fields and Beam Energy  Dose/Fx (Gy)  #Fx  Total Dose (Gy)  Start Date  End Date    Prostate and pelvic nodes  VMAT with 6 MV photons  2.5  28 / 28  70  10/1/2020  11/10/2020      Course 2:    Site  Technique  Energy  Dose/Fx (Gy)  #Fx  Total Dose (Gy)  Start Date  End Date  Elapsed Days    RLL Lung  SBRT  6X  11  5 / 5  55  9/21/2022 9/27/2022  6      Course 3:    Site  Technique  Energy  Dose/Fx (Gy)  #Fx  Total Dose (Gy)  Start Date  End Date  Elapsed Days    RUL Lung  SBRT  6X  12.5  4 / 4  50  2/26/2024  3/4/2024  7      Is the patient female between ages 15-55:  no    Does the patient have a CIED:  no      Assessment   This is a 69 y.o. 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 Biopsy of a RUL nodule, stations 7 and 11L/R nodes were negative for malignancy. PET/CT 9/6/22 demonstrated mild uptake in RLL nodule only. He completed definitive SBRT 55 Gy in 5 fx on 9/27/22.  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 Bro. Biopsy of an FDG avid RLL nodule was negative for malignancy; however, a 2.0 cm GGO in the RUL revealed lepidic adenocarcinoma. He completed definitive SBRT 50 Gy in 4 fx on 3/4/24.         History significant for high risk prostate cancer s/p EBRT 70 Gy in 28 fx +ADT with Dr. Villalobos completed 11/2020.       Doing well after radiation. He has not required increased supplemental O2 requirements. Denies chest or chest wall pain. CT Chest today 6/12/24 demonstrates general stability of recently treated RUL GGO (with new surrounding pneumonitis changes) and stability of his other pulmonary nodules. No new/growing disease by my review.          Plan   1) I will see him back in 3 months with CT Chest prior. If stable, will plan to extend back to q6 month  "surveillance.            CHIEF COMPLAINT: F/U after SBRT for lung cancer    HPI/Focused ROS:        Past Medical History:   Diagnosis Date    COPD (chronic obstructive pulmonary disease)     Diabetes mellitus     Prostate cancer        Past Surgical History:   Procedure Laterality Date    ROBOTIC BRONCHOSCOPY N/A 2022    Procedure: ROBOTIC BRONCHOSCOPY;  Surgeon: Laney Bro MD;  Location: [address];  Service: Pulmonary;  Laterality: N/A;    ROBOTIC BRONCHOSCOPY N/A 2024    Procedure: ROBOTIC BRONCHOSCOPY;  Surgeon: Laney Bro MD;  Location: [facility] OR [address] [place] [address];  Service: Pulmonary;  Laterality: N/A;       Social History     Tobacco Use    Smoking status: Former     Current packs/day: 0.00     Types: Cigarettes     Start date:      Quit date: 2022     Years since quittin.2    Smokeless tobacco: Never   Substance Use Topics    Alcohol use: Yes    Drug use: Never       Cancer-related family history includes Cancer in his father. There is no history of Melanoma.    Current Outpatient Medications on File Prior to Visit   Medication Sig Dispense Refill    albuterol (PROVENTIL/VENTOLIN HFA) 90 mcg/actuation inhaler Inhale 2 puffs into the lungs every 6 (six) hours as needed. 18 g 11    albuterol (PROVENTIL/VENTOLIN HFA) 90 mcg/actuation inhaler 1 puff as needed      BD BRENNA 2ND GEN PEN NEEDLE 32 gauge x 32" Ndle USE TO INJECT INSULIN UNDER THE SKIN EVERY DAY      bicalutamide (CASODEX) 50 MG Tab Take 1 tablet (50 mg total) by mouth once daily. 30 tablet 11    cholecalciferol, vitamin D3, 1,250 mcg (50,000 unit) capsule TAKE 1 CAPSULE BY MOUTH 1 TIME EVERY WEEK      ferrous sulfate (IRON, FERROUS SULFATE,) 325 mg (65 mg iron) Tab tablet Take 1 tablet (325 mg total) by mouth every other day. (Patient not taking: Reported on 2/15/2024) 45 tablet 1    fluticasone propionate (FLONASE) 50 mcg/actuation nasal spray       hydrOXYzine HCL (ATARAX) 25 MG tablet TAKE 1 TABLET BY MOUTH EVERY DAY AT " "BEDTIME AS NEEDED Oral for 30 Days      levocetirizine (XYZAL) 5 MG tablet Take 1 tablet (5 mg total) by mouth every evening. (Patient not taking: Reported on 11/9/2022) 30 tablet 11    metFORMIN (GLUCOPHAGE) 1000 MG tablet       prednisoLONE acetate (PRED FORTE) 1 % DrpS SHAKE LIQUID AND INSTILL 1 DROP IN LEFT EYE FOUR TIMES DAILY AFTER SURGERY      simvastatin (ZOCOR) 40 MG tablet Take 1 tablet by mouth.      tamsulosin (FLOMAX) 0.4 mg Cap Take 1 capsule by mouth.      TRESIBA FLEXTOUCH U-100 100 unit/mL (3 mL) InPn INJECT 20 UNITS UNDER THE SKIN ONCE DAILY      umeclidinium-vilanteroL (ANORO ELLIPTA) 62.5-25 mcg/actuation DsDv Inhale 1 puff into the lungs once daily. Controller 1 each 5     No current facility-administered medications on file prior to visit.       Review of patient's allergies indicates:  No Known Allergies      Vital Signs: BP (!) 140/82 (BP Location: Right arm, Patient Position: Sitting)   Pulse 86   Temp 98.1 °F (36.7 °C)   Resp 20   Ht 5' 10" (1.778 m)   Wt 85.4 kg (188 lb 4.4 oz)   SpO2 (!) 91%   BMI 27.01 kg/m²     ECOG Performance Status: 2    Physical Exam  Vitals reviewed.   Constitutional:       Appearance: Normal appearance.   HENT:      Head: Normocephalic and atraumatic.   Eyes:      General: No scleral icterus.     Extraocular Movements: Extraocular movements intact.   Pulmonary:      Effort: Pulmonary effort is normal. No respiratory distress.   Abdominal:      General: There is no distension.   Musculoskeletal:      Cervical back: Neck supple.   Lymphadenopathy:      Cervical: No cervical adenopathy.   Skin:     General: Skin is dry.      Coloration: Skin is not jaundiced.   Neurological:      General: No focal deficit present.      Mental Status: He is alert and oriented to person, place, and time.      Cranial Nerves: No cranial nerve deficit.   Psychiatric:         Mood and Affect: Mood normal.         Behavior: Behavior normal.         Judgment: Judgment normal.        "   Labs:    Imaging: I have personally reviewed the patient's available images and reports and summarized pertinent findings above in HPI.     Pathology: No new path

## 2024-07-24 ENCOUNTER — HOSPITAL ENCOUNTER (OUTPATIENT)
Dept: RADIOLOGY | Facility: OTHER | Age: 69
Discharge: HOME | End: 2024-07-24
Attending: NURSE PRACTITIONER
Payer: MEDICARE

## 2024-07-24 DIAGNOSIS — E04.1 THYROID NODULE: ICD-10-CM

## 2024-07-24 PROCEDURE — 76536 US EXAM OF HEAD AND NECK: CPT | Mod: TC

## 2024-07-24 PROCEDURE — 76536 US EXAM OF HEAD AND NECK: CPT | Mod: 26,,, | Performed by: RADIOLOGY

## 2024-07-30 ENCOUNTER — TELEPHONE (OUTPATIENT)
Dept: RADIATION THERAPY | Facility: HOSPITAL | Age: 69
End: 2024-07-30
Payer: MEDICARE

## 2024-08-26 ENCOUNTER — TELEPHONE (OUTPATIENT)
Dept: UROLOGY | Facility: CLINIC | Age: 69
End: 2024-08-26
Payer: MEDICARE

## 2024-08-26 NOTE — TELEPHONE ENCOUNTER
Appt reschedule w/patient. Mailed per request.    ----- Message from Jeff Avalos MD sent at 8/26/2024 12:08 PM CDT -----  No need to see me tomorrow    See me 3/2025 with psa and CT abdomen    Thanks  S  C  ----- Message -----  From: Elin Leung RN  Sent: 8/26/2024   9:45 AM CDT  To: Jeff Avalos MD    Good Morning!    Do you need to see him tomorrow if he is not having any symptoms? I think he may have been scheduled for follow up too early.    Thanks!  Elin

## 2024-09-18 ENCOUNTER — OFFICE VISIT (OUTPATIENT)
Dept: RADIATION ONCOLOGY | Facility: CLINIC | Age: 69
End: 2024-09-18
Payer: MEDICARE

## 2024-09-18 ENCOUNTER — HOSPITAL ENCOUNTER (OUTPATIENT)
Dept: RADIOLOGY | Facility: HOSPITAL | Age: 69
Discharge: HOME | End: 2024-09-18
Attending: RADIOLOGY
Payer: MEDICARE

## 2024-09-18 VITALS
DIASTOLIC BLOOD PRESSURE: 90 MMHG | HEART RATE: 88 BPM | OXYGEN SATURATION: 90 % | BODY MASS INDEX: 26.99 KG/M2 | TEMPERATURE: 99 F | WEIGHT: 188.5 LBS | RESPIRATION RATE: 21 BRPM | HEIGHT: 70 IN | SYSTOLIC BLOOD PRESSURE: 134 MMHG

## 2024-09-18 DIAGNOSIS — C34.11 PRIMARY MALIGNANT NEOPLASM OF RIGHT UPPER LOBE OF LUNG: ICD-10-CM

## 2024-09-18 DIAGNOSIS — Z85.118 PERSONAL HISTORY OF LUNG CANCER: Primary | ICD-10-CM

## 2024-09-18 PROCEDURE — 71250 CT THORAX DX C-: CPT | Mod: TC

## 2024-09-18 PROCEDURE — 99999 PR PBB SHADOW E&M-EST. PATIENT-LVL IV: CPT | Mod: PBBFAC,,, | Performed by: RADIOLOGY

## 2024-09-18 NOTE — PROGRESS NOTES
9/18/2024    Radiation Oncology Follow-Up Visit      Prior Radiation History:   Course 1:  Treatment Site  Treatment Fields and Beam Energy  Dose/Fx (Gy)  #Fx  Total Dose (Gy)  Start Date  End Date    Prostate and pelvic nodes  VMAT with 6 MV photons  2.5  28 / 28  70  10/1/2020  11/10/2020      Course 2:    Site  Technique  Energy  Dose/Fx (Gy)  #Fx  Total Dose (Gy)  Start Date  End Date  Elapsed Days    RLL Lung  SBRT  6X  11  5 / 5  55  9/21/2022 9/27/2022  6      Course 3:    Site  Technique  Energy  Dose/Fx (Gy)  #Fx  Total Dose (Gy)  Start Date  End Date  Elapsed Days    RUL Lung  SBRT  6X  12.5  4 / 4  50  2/26/2024  3/4/2024  7      Is the patient female between ages 15-55:  no    Does the patient have a CIED:  no      Assessment   This is a 69 y.o. 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 Biopsy of a RUL nodule, stations 7 and 11L/R nodes were negative for malignancy. PET/CT 9/6/22 demonstrated mild uptake in RLL nodule only. He completed definitive SBRT 55 Gy in 5 fx on 9/27/22.  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 Bro. Biopsy of an FDG avid RLL nodule was negative for malignancy; however, a 2.0 cm GGO in the RUL revealed lepidic adenocarcinoma. He completed definitive SBRT 50 Gy in 4 fx on 3/4/24.         History significant for high risk prostate cancer s/p EBRT 70 Gy in 28 fx +ADT with Dr. Villalobos completed 11/2020.     CT Chest today 9/18/24 demonstrates relatively stable size of multiple small Right lung nodules and post-treatment changes in RUL/RLL. No evidence of new disease by my review.          Plan   1) I will see him back in 6 months with CT Chest prior for re-staging.            CHIEF COMPLAINT: F/U after SBRT for lung cancer    HPI/Focused ROS:  Doing well since last visit, reports dyspnea remains at baseline on 2L O2. Denies chest pain or recent cough,  "fevers, or weight loss.       Past Medical History:   Diagnosis Date    COPD (chronic obstructive pulmonary disease)     Diabetes mellitus     Prostate cancer        Past Surgical History:   Procedure Laterality Date    ROBOTIC BRONCHOSCOPY N/A 2022    Procedure: ROBOTIC BRONCHOSCOPY;  Surgeon: Laney Bro MD;  Location: [address];  Service: Pulmonary;  Laterality: N/A;    ROBOTIC BRONCHOSCOPY N/A 2024    Procedure: ROBOTIC BRONCHOSCOPY;  Surgeon: Laney Bro MD;  Location: [address];  Service: Pulmonary;  Laterality: N/A;       Social History     Tobacco Use    Smoking status: Former     Current packs/day: 0.00     Types: Cigarettes     Start date:      Quit date: 2022     Years since quittin.4    Smokeless tobacco: Never   Substance Use Topics    Alcohol use: Yes    Drug use: Never       Cancer-related family history includes Cancer in his father. There is no history of Melanoma.    Current Outpatient Medications on File Prior to Visit   Medication Sig Dispense Refill    albuterol (PROVENTIL/VENTOLIN HFA) 90 mcg/actuation inhaler Inhale 2 puffs into the lungs every 6 (six) hours as needed. 18 g 11    albuterol (PROVENTIL/VENTOLIN HFA) 90 mcg/actuation inhaler 1 puff as needed      BD BRENNA 2ND GEN PEN NEEDLE 32 gauge x 5/32" Ndle USE TO INJECT INSULIN UNDER THE SKIN EVERY DAY      bicalutamide (CASODEX) 50 MG Tab Take 1 tablet (50 mg total) by mouth once daily. 30 tablet 11    cholecalciferol, vitamin D3, 1,250 mcg (50,000 unit) capsule TAKE 1 CAPSULE BY MOUTH 1 TIME EVERY WEEK      ferrous sulfate (IRON, FERROUS SULFATE,) 325 mg (65 mg iron) Tab tablet Take 1 tablet (325 mg total) by mouth every other day. (Patient not taking: Reported on 2/15/2024) 45 tablet 1    fluticasone propionate (FLONASE) 50 mcg/actuation nasal spray       hydrOXYzine HCL (ATARAX) 25 MG tablet TAKE 1 TABLET BY MOUTH EVERY DAY AT BEDTIME AS NEEDED Oral for 30 Days      levocetirizine (XYZAL) 5 MG " "tablet Take 1 tablet (5 mg total) by mouth every evening. (Patient not taking: Reported on 11/9/2022) 30 tablet 11    metFORMIN (GLUCOPHAGE) 1000 MG tablet       prednisoLONE acetate (PRED FORTE) 1 % DrpS SHAKE LIQUID AND INSTILL 1 DROP IN LEFT EYE FOUR TIMES DAILY AFTER SURGERY      simvastatin (ZOCOR) 40 MG tablet Take 1 tablet by mouth.      tamsulosin (FLOMAX) 0.4 mg Cap Take 1 capsule by mouth.      TRESIBA FLEXTOUCH U-100 100 unit/mL (3 mL) InPn INJECT 20 UNITS UNDER THE SKIN ONCE DAILY      umeclidinium-vilanteroL (ANORO ELLIPTA) 62.5-25 mcg/actuation DsDv Inhale 1 puff into the lungs once daily. Controller 1 each 5     No current facility-administered medications on file prior to visit.       Review of patient's allergies indicates:  No Known Allergies      Vital Signs: BP (!) 134/90 (BP Location: Right arm, Patient Position: Sitting)   Pulse 88   Temp 98.8 °F (37.1 °C)   Resp (!) 21   Ht 5' 9.5" (1.765 m)   Wt 85.5 kg (188 lb 7.9 oz)   SpO2 (!) 90%   BMI 27.44 kg/m²     ECOG Performance Status: 2    Physical Exam  Constitutional:       Appearance: Normal appearance.   HENT:      Head: Normocephalic and atraumatic.   Eyes:      General: No scleral icterus.     Extraocular Movements: Extraocular movements intact.   Pulmonary:      Effort: No accessory muscle usage or respiratory distress.      Comments: On supplemental O2 2L  Musculoskeletal:      Cervical back: Normal range of motion.   Neurological:      Mental Status: He is alert and oriented to person, place, and time.   Psychiatric:         Mood and Affect: Mood and affect normal.         Judgment: Judgment normal.          Labs:    Imaging: I have personally reviewed the patient's available images and reports and summarized pertinent findings above in HPI.     Pathology: No new path            "

## 2024-09-19 ENCOUNTER — TELEPHONE (OUTPATIENT)
Dept: PULMONOLOGY | Facility: CLINIC | Age: 69
End: 2024-09-19
Payer: MEDICARE

## 2024-09-19 NOTE — TELEPHONE ENCOUNTER
I spoke with patient's daughter, Claire to schedule follow up with Dr Bro per Dr Vasquez's request. I offered 9/23/24 at 8am, 9/26/24 at 8am or 8:30am which was declined. Then, I offered 10/14/24 at 8am and she confirmed & verbalized understanding.

## 2024-09-19 NOTE — TELEPHONE ENCOUNTER
----- Message from Laney Bro MD sent at 9/19/2024 10:48 AM CDT -----  Regarding: RE:  Hi there,  No need for referral.  will schedule follow up for him.   Laney Weiss  ----- Message -----  From: Sathish Vasquez MD  Sent: 9/18/2024  12:04 PM CDT  To: Laney Bro MD    Hi aLney, I saw Mr. Robles today. His CT Chest is stable. He and his daughter were asking about getting back in to see if his COPD can be optimized; they aren't sure his PCP is managing it. He remains on portable oxygen and feels that his breathing has been stable for the past 6 months.     I can place a new referral if needed.     Thanks,    Sathish

## 2024-09-24 ENCOUNTER — HOSPITAL ENCOUNTER (INPATIENT)
Facility: OTHER | Age: 69
LOS: 1 days | Discharge: HOME | DRG: 178 | End: 2024-09-26
Attending: EMERGENCY MEDICINE | Admitting: INTERNAL MEDICINE
Payer: MEDICARE

## 2024-09-24 DIAGNOSIS — U07.1 COVID-19 VIRUS INFECTION: Primary | ICD-10-CM

## 2024-09-24 DIAGNOSIS — R06.02 SHORTNESS OF BREATH: ICD-10-CM

## 2024-09-24 DIAGNOSIS — R79.89 ELEVATED TROPONIN: ICD-10-CM

## 2024-09-24 DIAGNOSIS — J44.9 CHRONIC OBSTRUCTIVE PULMONARY DISEASE, UNSPECIFIED COPD TYPE: ICD-10-CM

## 2024-09-24 DIAGNOSIS — C34.90 MALIGNANT NEOPLASM OF LUNG, UNSPECIFIED LATERALITY, UNSPECIFIED PART OF LUNG: ICD-10-CM

## 2024-09-24 PROBLEM — N28.89 RENAL MASS: Status: ACTIVE | Noted: 2024-09-24

## 2024-09-24 PROBLEM — J96.11 CHRONIC HYPOXIC RESPIRATORY FAILURE, ON HOME OXYGEN THERAPY: Status: ACTIVE | Noted: 2024-09-24

## 2024-09-24 PROBLEM — Z99.81 CHRONIC HYPOXIC RESPIRATORY FAILURE, ON HOME OXYGEN THERAPY: Status: ACTIVE | Noted: 2024-09-24

## 2024-09-24 PROBLEM — E11.9 DIABETES MELLITUS, TYPE 2: Status: ACTIVE | Noted: 2024-09-24

## 2024-09-24 LAB
ALBUMIN SERPL BCP-MCNC: 2.3 G/DL (ref 3.5–5.2)
ALP SERPL-CCNC: 79 U/L (ref 55–135)
ALT SERPL W/O P-5'-P-CCNC: 17 U/L (ref 10–44)
ANION GAP SERPL CALC-SCNC: 12 MMOL/L (ref 8–16)
AST SERPL-CCNC: 18 U/L (ref 10–40)
BASOPHILS # BLD AUTO: 0.02 K/UL (ref 0–0.2)
BASOPHILS NFR BLD: 0.3 % (ref 0–1.9)
BILIRUB SERPL-MCNC: 0.2 MG/DL (ref 0.1–1)
BNP SERPL-MCNC: 38 PG/ML (ref 0–99)
BUN SERPL-MCNC: 21 MG/DL (ref 8–23)
CALCIUM SERPL-MCNC: 8.4 MG/DL (ref 8.7–10.5)
CHLORIDE SERPL-SCNC: 107 MMOL/L (ref 95–110)
CK SERPL-CCNC: 219 U/L (ref 20–200)
CO2 SERPL-SCNC: 20 MMOL/L (ref 23–29)
CREAT SERPL-MCNC: 1.4 MG/DL (ref 0.5–1.4)
CTP QC/QA: YES
CTP QC/QA: YES
DIFFERENTIAL METHOD BLD: ABNORMAL
EOSINOPHIL # BLD AUTO: 0 K/UL (ref 0–0.5)
EOSINOPHIL NFR BLD: 0.2 % (ref 0–8)
ERYTHROCYTE [DISTWIDTH] IN BLOOD BY AUTOMATED COUNT: 14.3 % (ref 11.5–14.5)
ERYTHROCYTE [SEDIMENTATION RATE] IN BLOOD BY PHOTOMETRIC METHOD: >120 MM/HR (ref 0–23)
EST. GFR  (NO RACE VARIABLE): 54 ML/MIN/1.73 M^2
GLUCOSE SERPL-MCNC: 274 MG/DL (ref 70–110)
HCO3 UR-SCNC: 22.7 MMOL/L (ref 24–28)
HCT VFR BLD AUTO: 37.8 % (ref 40–54)
HCT VFR BLD CALC: 37 %PCV (ref 36–54)
HGB BLD-MCNC: 11.4 G/DL (ref 14–18)
HGB BLD-MCNC: 13 G/DL
IMM GRANULOCYTES # BLD AUTO: 0.09 K/UL (ref 0–0.04)
IMM GRANULOCYTES NFR BLD AUTO: 1.4 % (ref 0–0.5)
LACTATE SERPL-SCNC: 2.8 MMOL/L (ref 0.5–2.2)
LDH SERPL L TO P-CCNC: 1.61 MMOL/L (ref 0.5–2.2)
LDH SERPL L TO P-CCNC: 217 U/L (ref 110–260)
LYMPHOCYTES # BLD AUTO: 0.7 K/UL (ref 1–4.8)
LYMPHOCYTES NFR BLD: 11.3 % (ref 18–48)
MAGNESIUM SERPL-MCNC: 1.7 MG/DL (ref 1.6–2.6)
MCH RBC QN AUTO: 27.1 PG (ref 27–31)
MCHC RBC AUTO-ENTMCNC: 30.2 G/DL (ref 32–36)
MCV RBC AUTO: 90 FL (ref 82–98)
MONOCYTES # BLD AUTO: 0.9 K/UL (ref 0.3–1)
MONOCYTES NFR BLD: 13.7 % (ref 4–15)
NEUTROPHILS # BLD AUTO: 4.5 K/UL (ref 1.8–7.7)
NEUTROPHILS NFR BLD: 73.1 % (ref 38–73)
NRBC BLD-RTO: 0 /100 WBC
OHS QRS DURATION: 86 MS
OHS QTC CALCULATION: 452 MS
PCO2 BLDA: 29.1 MMHG (ref 35–45)
PH SMN: 7.5 [PH] (ref 7.35–7.45)
PLATELET # BLD AUTO: 241 K/UL (ref 150–450)
PMV BLD AUTO: 10.4 FL (ref 9.2–12.9)
PO2 BLDA: 52 MMHG (ref 40–60)
POC BE: 0 MMOL/L
POC MOLECULAR INFLUENZA A AGN: NEGATIVE
POC MOLECULAR INFLUENZA B AGN: NEGATIVE
POC SATURATED O2: 90 % (ref 95–100)
POC TCO2: 24 MMOL/L (ref 24–29)
POCT GLUCOSE: 310 MG/DL (ref 70–110)
POCT GLUCOSE: 394 MG/DL (ref 70–110)
POCT GLUCOSE: 486 MG/DL (ref 70–110)
POTASSIUM SERPL-SCNC: 4.1 MMOL/L (ref 3.5–5.1)
PROCALCITONIN SERPL IA-MCNC: 1.04 NG/ML
PROT SERPL-MCNC: 6.5 G/DL (ref 6–8.4)
RBC # BLD AUTO: 4.2 M/UL (ref 4.6–6.2)
SAMPLE: ABNORMAL
SAMPLE: NORMAL
SARS-COV-2 RDRP RESP QL NAA+PROBE: POSITIVE
SODIUM SERPL-SCNC: 139 MMOL/L (ref 136–145)
TROPONIN I SERPL DL<=0.01 NG/ML-MCNC: 0.04 NG/ML (ref 0–0.03)
TROPONIN I SERPL DL<=0.01 NG/ML-MCNC: 0.1 NG/ML (ref 0–0.03)
WBC # BLD AUTO: 6.21 K/UL (ref 3.9–12.7)

## 2024-09-24 PROCEDURE — 94761 N-INVAS EAR/PLS OXIMETRY MLT: CPT

## 2024-09-24 PROCEDURE — G0378 HOSPITAL OBSERVATION PER HR: HCPCS

## 2024-09-24 PROCEDURE — 96375 TX/PRO/DX INJ NEW DRUG ADDON: CPT

## 2024-09-24 PROCEDURE — 82728 ASSAY OF FERRITIN: CPT | Performed by: NURSE PRACTITIONER

## 2024-09-24 PROCEDURE — 94660 CPAP INITIATION&MGMT: CPT

## 2024-09-24 PROCEDURE — 99900035 HC TECH TIME PER 15 MIN (STAT)

## 2024-09-24 PROCEDURE — 63600175 PHARM REV CODE 636 W HCPCS: Performed by: NURSE PRACTITIONER

## 2024-09-24 PROCEDURE — 25000003 PHARM REV CODE 250: Performed by: NURSE PRACTITIONER

## 2024-09-24 PROCEDURE — 84145 PROCALCITONIN (PCT): CPT

## 2024-09-24 PROCEDURE — 25000242 PHARM REV CODE 250 ALT 637 W/ HCPCS: Performed by: INTERNAL MEDICINE

## 2024-09-24 PROCEDURE — 84484 ASSAY OF TROPONIN QUANT: CPT

## 2024-09-24 PROCEDURE — 83615 LACTATE (LD) (LDH) ENZYME: CPT | Performed by: NURSE PRACTITIONER

## 2024-09-24 PROCEDURE — 83605 ASSAY OF LACTIC ACID: CPT | Performed by: NURSE PRACTITIONER

## 2024-09-24 PROCEDURE — 83735 ASSAY OF MAGNESIUM: CPT

## 2024-09-24 PROCEDURE — 96365 THER/PROPH/DIAG IV INF INIT: CPT | Mod: 59

## 2024-09-24 PROCEDURE — 85025 COMPLETE CBC W/AUTO DIFF WBC: CPT

## 2024-09-24 PROCEDURE — 94640 AIRWAY INHALATION TREATMENT: CPT | Mod: XB

## 2024-09-24 PROCEDURE — 94640 AIRWAY INHALATION TREATMENT: CPT

## 2024-09-24 PROCEDURE — 87635 SARS-COV-2 COVID-19 AMP PRB: CPT

## 2024-09-24 PROCEDURE — 36415 COLL VENOUS BLD VENIPUNCTURE: CPT | Performed by: NURSE PRACTITIONER

## 2024-09-24 PROCEDURE — 27100098 HC SPACER

## 2024-09-24 PROCEDURE — 27000190 HC CPAP FULL FACE MASK W/VALVE

## 2024-09-24 PROCEDURE — 80053 COMPREHEN METABOLIC PANEL: CPT

## 2024-09-24 PROCEDURE — 93005 ELECTROCARDIOGRAM TRACING: CPT

## 2024-09-24 PROCEDURE — 93010 ELECTROCARDIOGRAM REPORT: CPT | Mod: ,,, | Performed by: INTERNAL MEDICINE

## 2024-09-24 PROCEDURE — 87502 INFLUENZA DNA AMP PROBE: CPT

## 2024-09-24 PROCEDURE — 85652 RBC SED RATE AUTOMATED: CPT | Performed by: NURSE PRACTITIONER

## 2024-09-24 PROCEDURE — 82962 GLUCOSE BLOOD TEST: CPT

## 2024-09-24 PROCEDURE — 96372 THER/PROPH/DIAG INJ SC/IM: CPT | Performed by: NURSE PRACTITIONER

## 2024-09-24 PROCEDURE — XW033E5 INTRODUCTION OF REMDESIVIR ANTI-INFECTIVE INTO PERIPHERAL VEIN, PERCUTANEOUS APPROACH, NEW TECHNOLOGY GROUP 5: ICD-10-PCS | Performed by: NURSE PRACTITIONER

## 2024-09-24 PROCEDURE — 83036 HEMOGLOBIN GLYCOSYLATED A1C: CPT | Performed by: NURSE PRACTITIONER

## 2024-09-24 PROCEDURE — 25000242 PHARM REV CODE 250 ALT 637 W/ HCPCS: Performed by: EMERGENCY MEDICINE

## 2024-09-24 PROCEDURE — 82550 ASSAY OF CK (CPK): CPT | Performed by: NURSE PRACTITIONER

## 2024-09-24 PROCEDURE — 83880 ASSAY OF NATRIURETIC PEPTIDE: CPT

## 2024-09-24 PROCEDURE — 96361 HYDRATE IV INFUSION ADD-ON: CPT

## 2024-09-24 PROCEDURE — 5A09357 ASSISTANCE WITH RESPIRATORY VENTILATION, LESS THAN 24 CONSECUTIVE HOURS, CONTINUOUS POSITIVE AIRWAY PRESSURE: ICD-10-PCS | Performed by: EMERGENCY MEDICINE

## 2024-09-24 PROCEDURE — 99285 EMERGENCY DEPT VISIT HI MDM: CPT | Mod: 25

## 2024-09-24 PROCEDURE — 84484 ASSAY OF TROPONIN QUANT: CPT | Mod: 91 | Performed by: NURSE PRACTITIONER

## 2024-09-24 PROCEDURE — 94799 UNLISTED PULMONARY SVC/PX: CPT

## 2024-09-24 PROCEDURE — 27000221 HC OXYGEN, UP TO 24 HOURS

## 2024-09-24 RX ORDER — BICALUTAMIDE 50 MG/1
50 TABLET, FILM COATED ORAL DAILY
Status: DISCONTINUED | OUTPATIENT
Start: 2024-09-25 | End: 2024-09-26 | Stop reason: HOSPADM

## 2024-09-24 RX ORDER — GUAIFENESIN AND DEXTROMETHORPHAN HYDROBROMIDE 10; 100 MG/5ML; MG/5ML
10 SYRUP ORAL EVERY 4 HOURS PRN
Status: DISCONTINUED | OUTPATIENT
Start: 2024-09-24 | End: 2024-09-26 | Stop reason: HOSPADM

## 2024-09-24 RX ORDER — ASCORBIC ACID 500 MG
500 TABLET ORAL 2 TIMES DAILY
Status: DISCONTINUED | OUTPATIENT
Start: 2024-09-24 | End: 2024-09-26 | Stop reason: HOSPADM

## 2024-09-24 RX ORDER — SODIUM CHLORIDE, SODIUM LACTATE, POTASSIUM CHLORIDE, CALCIUM CHLORIDE 600; 310; 30; 20 MG/100ML; MG/100ML; MG/100ML; MG/100ML
INJECTION, SOLUTION INTRAVENOUS CONTINUOUS
Status: DISCONTINUED | OUTPATIENT
Start: 2024-09-24 | End: 2024-09-26 | Stop reason: HOSPADM

## 2024-09-24 RX ORDER — ONDANSETRON HYDROCHLORIDE 2 MG/ML
4 INJECTION, SOLUTION INTRAVENOUS EVERY 8 HOURS PRN
Status: DISCONTINUED | OUTPATIENT
Start: 2024-09-24 | End: 2024-09-26 | Stop reason: HOSPADM

## 2024-09-24 RX ORDER — AMOXICILLIN 250 MG
1 CAPSULE ORAL 2 TIMES DAILY
Status: DISCONTINUED | OUTPATIENT
Start: 2024-09-24 | End: 2024-09-26 | Stop reason: HOSPADM

## 2024-09-24 RX ORDER — IBUPROFEN 200 MG
16 TABLET ORAL
Status: DISCONTINUED | OUTPATIENT
Start: 2024-09-24 | End: 2024-09-26 | Stop reason: HOSPADM

## 2024-09-24 RX ORDER — ONDANSETRON 8 MG/1
8 TABLET, ORALLY DISINTEGRATING ORAL EVERY 8 HOURS PRN
Status: DISCONTINUED | OUTPATIENT
Start: 2024-09-24 | End: 2024-09-26 | Stop reason: HOSPADM

## 2024-09-24 RX ORDER — SODIUM CHLORIDE 0.9 % (FLUSH) 0.9 %
10 SYRINGE (ML) INJECTION
Status: DISCONTINUED | OUTPATIENT
Start: 2024-09-24 | End: 2024-09-24

## 2024-09-24 RX ORDER — IBUPROFEN 200 MG
24 TABLET ORAL
Status: DISCONTINUED | OUTPATIENT
Start: 2024-09-24 | End: 2024-09-26 | Stop reason: HOSPADM

## 2024-09-24 RX ORDER — TAMSULOSIN HYDROCHLORIDE 0.4 MG/1
0.4 CAPSULE ORAL DAILY
Status: DISCONTINUED | OUTPATIENT
Start: 2024-09-25 | End: 2024-09-26 | Stop reason: HOSPADM

## 2024-09-24 RX ORDER — SODIUM CHLORIDE 0.9 % (FLUSH) 0.9 %
10 SYRINGE (ML) INJECTION
Status: DISCONTINUED | OUTPATIENT
Start: 2024-09-24 | End: 2024-09-26 | Stop reason: HOSPADM

## 2024-09-24 RX ORDER — IPRATROPIUM BROMIDE AND ALBUTEROL SULFATE 2.5; .5 MG/3ML; MG/3ML
3 SOLUTION RESPIRATORY (INHALATION) EVERY 4 HOURS PRN
Status: DISCONTINUED | OUTPATIENT
Start: 2024-09-24 | End: 2024-09-26 | Stop reason: HOSPADM

## 2024-09-24 RX ORDER — INSULIN GLARGINE 100 [IU]/ML
18 INJECTION, SOLUTION SUBCUTANEOUS NIGHTLY
Status: DISCONTINUED | OUTPATIENT
Start: 2024-09-24 | End: 2024-09-25

## 2024-09-24 RX ORDER — DEXAMETHASONE SODIUM PHOSPHATE 10 MG/ML
12 INJECTION INTRAMUSCULAR; INTRAVENOUS ONCE
Status: COMPLETED | OUTPATIENT
Start: 2024-09-24 | End: 2024-09-24

## 2024-09-24 RX ORDER — POLYETHYLENE GLYCOL 3350 17 G/17G
17 POWDER, FOR SOLUTION ORAL 2 TIMES DAILY PRN
Status: DISCONTINUED | OUTPATIENT
Start: 2024-09-24 | End: 2024-09-26 | Stop reason: HOSPADM

## 2024-09-24 RX ORDER — HYDROCODONE BITARTRATE AND ACETAMINOPHEN 5; 325 MG/1; MG/1
1 TABLET ORAL EVERY 6 HOURS PRN
Status: DISCONTINUED | OUTPATIENT
Start: 2024-09-24 | End: 2024-09-26 | Stop reason: HOSPADM

## 2024-09-24 RX ORDER — ACETAMINOPHEN 325 MG/1
650 TABLET ORAL EVERY 4 HOURS PRN
Status: DISCONTINUED | OUTPATIENT
Start: 2024-09-24 | End: 2024-09-26 | Stop reason: HOSPADM

## 2024-09-24 RX ORDER — INSULIN ASPART 100 [IU]/ML
0-5 INJECTION, SOLUTION INTRAVENOUS; SUBCUTANEOUS
Status: DISCONTINUED | OUTPATIENT
Start: 2024-09-24 | End: 2024-09-26 | Stop reason: HOSPADM

## 2024-09-24 RX ORDER — FLUTICASONE FUROATE AND VILANTEROL 200; 25 UG/1; UG/1
1 POWDER RESPIRATORY (INHALATION) DAILY
Status: DISCONTINUED | OUTPATIENT
Start: 2024-09-25 | End: 2024-09-26 | Stop reason: HOSPADM

## 2024-09-24 RX ORDER — IPRATROPIUM BROMIDE AND ALBUTEROL SULFATE 2.5; .5 MG/3ML; MG/3ML
3 SOLUTION RESPIRATORY (INHALATION)
Status: COMPLETED | OUTPATIENT
Start: 2024-09-24 | End: 2024-09-24

## 2024-09-24 RX ORDER — ENOXAPARIN SODIUM 100 MG/ML
1 INJECTION SUBCUTANEOUS 2 TIMES DAILY
Status: DISCONTINUED | OUTPATIENT
Start: 2024-09-24 | End: 2024-09-25

## 2024-09-24 RX ORDER — QUETIAPINE FUMARATE 300 MG/1
300 TABLET, FILM COATED ORAL NIGHTLY
Status: ON HOLD | COMMUNITY
End: 2024-09-26

## 2024-09-24 RX ORDER — ALBUTEROL SULFATE 90 UG/1
2 INHALANT RESPIRATORY (INHALATION)
Status: DISCONTINUED | OUTPATIENT
Start: 2024-09-24 | End: 2024-09-24

## 2024-09-24 RX ORDER — MORPHINE SULFATE 2 MG/ML
2 INJECTION, SOLUTION INTRAMUSCULAR; INTRAVENOUS EVERY 4 HOURS PRN
Status: DISCONTINUED | OUTPATIENT
Start: 2024-09-24 | End: 2024-09-26 | Stop reason: HOSPADM

## 2024-09-24 RX ORDER — TALC
6 POWDER (GRAM) TOPICAL NIGHTLY PRN
Status: DISCONTINUED | OUTPATIENT
Start: 2024-09-24 | End: 2024-09-26 | Stop reason: HOSPADM

## 2024-09-24 RX ORDER — GLUCAGON 1 MG
1 KIT INJECTION
Status: DISCONTINUED | OUTPATIENT
Start: 2024-09-24 | End: 2024-09-26 | Stop reason: HOSPADM

## 2024-09-24 RX ORDER — ATORVASTATIN CALCIUM 20 MG/1
20 TABLET, FILM COATED ORAL DAILY
Status: DISCONTINUED | OUTPATIENT
Start: 2024-09-25 | End: 2024-09-26 | Stop reason: HOSPADM

## 2024-09-24 RX ORDER — ALBUTEROL SULFATE 90 UG/1
2 INHALANT RESPIRATORY (INHALATION)
Status: DISCONTINUED | OUTPATIENT
Start: 2024-09-24 | End: 2024-09-26 | Stop reason: HOSPADM

## 2024-09-24 RX ADMIN — IPRATROPIUM BROMIDE AND ALBUTEROL SULFATE 3 ML: 2.5; .5 SOLUTION RESPIRATORY (INHALATION) at 03:09

## 2024-09-24 RX ADMIN — QUETIAPINE FUMARATE 300 MG: 200 TABLET ORAL at 10:09

## 2024-09-24 RX ADMIN — OXYCODONE HYDROCHLORIDE AND ACETAMINOPHEN 500 MG: 500 TABLET ORAL at 08:09

## 2024-09-24 RX ADMIN — ALBUTEROL SULFATE 2 PUFF: 90 AEROSOL, METERED RESPIRATORY (INHALATION) at 07:09

## 2024-09-24 RX ADMIN — REMDESIVIR 200 MG: 100 INJECTION, POWDER, LYOPHILIZED, FOR SOLUTION INTRAVENOUS at 10:09

## 2024-09-24 RX ADMIN — ACETAMINOPHEN 650 MG: 325 TABLET, FILM COATED ORAL at 10:09

## 2024-09-24 RX ADMIN — ENOXAPARIN SODIUM 80 MG: 100 INJECTION SUBCUTANEOUS at 11:09

## 2024-09-24 RX ADMIN — DEXAMETHASONE SODIUM PHOSPHATE 12 MG: 10 INJECTION INTRAMUSCULAR; INTRAVENOUS at 10:09

## 2024-09-24 RX ADMIN — INSULIN GLARGINE 18 UNITS: 100 INJECTION, SOLUTION SUBCUTANEOUS at 08:09

## 2024-09-24 RX ADMIN — INSULIN ASPART 5 UNITS: 100 INJECTION, SOLUTION INTRAVENOUS; SUBCUTANEOUS at 08:09

## 2024-09-24 RX ADMIN — GUAIFENESIN AND DEXTROMETHORPHAN 10 ML: 100; 10 SYRUP ORAL at 10:09

## 2024-09-24 RX ADMIN — SODIUM CHLORIDE, POTASSIUM CHLORIDE, SODIUM LACTATE AND CALCIUM CHLORIDE 1000 ML: 600; 310; 30; 20 INJECTION, SOLUTION INTRAVENOUS at 11:09

## 2024-09-24 RX ADMIN — DOCUSATE SODIUM AND SENNOSIDES 1 TABLET: 8.6; 5 TABLET, FILM COATED ORAL at 08:09

## 2024-09-24 NOTE — ED TRIAGE NOTES
Pt presents to the ED with c/o SOB onset today while at PCP office. Pt has hx of COPD and lung cancer. Pt AAOx3, NAD noted.

## 2024-09-24 NOTE — ED NOTES
Health Maintenance       Hepatitis B Vaccine (1 of 3 - 19+ 3-dose series)  Never done    Pneumococcal Vaccine 0-64 (2 of 2 - PCV)  Overdue since 9/26/2017    COVID-19 Vaccine (6 - 2023-24 season)  Overdue since 9/1/2023    Traditional Medicare- Medicare Wellness Visit (Yearly)  Never done    DTaP/Tdap/Td Vaccine (1 - Tdap)  Postponed until 11/29/2024           Following review of the above:  Patient wishes to discuss with clinician: Pneumococcal  Patient is not proceeding with: COVID-19 and Hep B    Note: Refer to final orders and clinician documentation.       RT made aware of tx

## 2024-09-24 NOTE — ED PROVIDER NOTES
Encounter Date: 2024       History     Chief Complaint   Patient presents with    Shortness of Breath     Came in from pcp clinic per EMS, reports of sob 75% RA, 81% on 3L. Placed on bipap, now 100%. HR ranging from 80s-120s. Hx of copd, emphysema, and lung CA     69-year-old male with history of prostate cancer, lung cancer, COPD, and diabetes presents with complaint of shortness of breath.  His daughter states that 4 days ago he fell ill, with malaise, chest congestion, coughing.    Patient presented at clinic today and was found to be hypoxic, with O2 sats in the 70s. Per EMS, he was mid 80s on RA and came up after duonebs and CPAP.     Patient traditionally sats 89-90% while wearing his home oxygen, which he did not have at the clinic.    He reports that he has not been taking his diabetes medication over the last couple of days.    The history is provided by the patient, a relative and medical records.     Review of patient's allergies indicates:  No Known Allergies  Past Medical History:   Diagnosis Date    COPD (chronic obstructive pulmonary disease)     Diabetes mellitus     Prostate cancer      Past Surgical History:   Procedure Laterality Date    ROBOTIC BRONCHOSCOPY N/A 2022    Procedure: ROBOTIC BRONCHOSCOPY;  Surgeon: Laney Bro MD;  Location: [address];  Service: Pulmonary;  Laterality: N/A;    ROBOTIC BRONCHOSCOPY N/A 2024    Procedure: ROBOTIC BRONCHOSCOPY;  Surgeon: Laney Bro MD;  Location: [address];  Service: Pulmonary;  Laterality: N/A;     Family History   Problem Relation Name Age of Onset    Cancer Father      Diabetes Mother      Melanoma Neg Hx       Social History     Tobacco Use    Smoking status: Former     Current packs/day: 0.00     Types: Cigarettes     Start date:      Quit date: 2022     Years since quittin.4    Smokeless tobacco: Never   Substance Use Topics    Alcohol use: Yes    Drug use: Never     Physical Exam     Initial Vitals    BP Pulse Resp Temp SpO2   09/24/24 1251 09/24/24 1251 09/24/24 1302 09/24/24 1250 09/24/24 1249   126/79 97 (!) 35 97.9 °F (36.6 °C) 100 %      MAP       --                Physical Exam    Constitutional: He appears well-developed and well-nourished. He is not diaphoretic.   HENT:   Head: Normocephalic and atraumatic.   Cardiovascular:  Normal rate and regular rhythm.           Pulmonary/Chest: He is in respiratory distress.   Crackles appreciated RLL   Abdominal: Abdomen is soft. He exhibits no distension. There is no abdominal tenderness.     Neurological: He is alert.   Skin: Skin is warm and dry.   Psychiatric: He has a normal mood and affect. Thought content normal.         ED Course   Procedures  Labs Reviewed   COMPREHENSIVE METABOLIC PANEL - Abnormal       Result Value    Sodium 139      Potassium 4.1      Chloride 107      CO2 20 (*)     Glucose 274 (*)     BUN 21      Creatinine 1.4      Calcium 8.4 (*)     Total Protein 6.5      Albumin 2.3 (*)     Total Bilirubin 0.2      Alkaline Phosphatase 79      AST 18      ALT 17      eGFR 54 (*)     Anion Gap 12     CBC W/ AUTO DIFFERENTIAL - Abnormal    WBC 6.21      RBC 4.20 (*)     Hemoglobin 11.4 (*)     Hematocrit 37.8 (*)     MCV 90      MCH 27.1      MCHC 30.2 (*)     RDW 14.3      Platelets 241      MPV 10.4      Immature Granulocytes 1.4 (*)     Gran # (ANC) 4.5      Immature Grans (Abs) 0.09 (*)     Lymph # 0.7 (*)     Mono # 0.9      Eos # 0.0      Baso # 0.02      nRBC 0      Gran % 73.1 (*)     Lymph % 11.3 (*)     Mono % 13.7      Eosinophil % 0.2      Basophil % 0.3      Differential Method Automated     TROPONIN I - Abnormal    Troponin I 0.043 (*)    PROCALCITONIN - Abnormal    Procalcitonin 1.04 (*)    SARS-COV-2 RDRP GENE - Abnormal    POC Rapid COVID Positive (*)      Acceptable Yes     POCT GLUCOSE - Abnormal    POCT Glucose 310 (*)    ISTAT PROCEDURE - Abnormal    POC PH 7.501 (*)     POC PCO2 29.1 (*)     POC PO2 52      POC  HCO3 22.7 (*)     POC BE 0      POC SATURATED O2 90      POC TCO2 24      POC Hematocrit 37      POC HEMOGLOBIN 13      Sample VENOUS     B-TYPE NATRIURETIC PEPTIDE    BNP 38     MAGNESIUM    Magnesium 1.7     POCT INFLUENZA A/B MOLECULAR    POC Molecular Influenza A Ag Negative      POC Molecular Influenza B Ag Negative       Acceptable Yes     ISTAT LACTATE    POC Lactate 1.61      Sample VENOUS            Imaging Results              X-Ray Chest AP Portable (Final result)  Result time 09/24/24 14:05:02      Final result by Yoel Jewell MD (09/24/24 14:05:02)                   Impression:      1. Pulmonary findings suggest edema noting the process is more focal projected over the right lower lung zone and right upper lung zone, developing consolidation not excluded.  Clinical correlation is advised.  2. Patient has known right pulmonary malignancy, this is better evaluated on CT 09/18/2024.      Electronically signed by: Yoel Jewell MD  Date:    09/24/2024  Time:    14:05               Narrative:    EXAMINATION:  XR CHEST AP PORTABLE    CLINICAL HISTORY:  shortness of breath;    TECHNIQUE:  Single frontal view of the chest was performed.    COMPARISON:  03/07/2024    FINDINGS:  The cardiomediastinal silhouette is not enlarged.  There is no pleural effusion.  The trachea is midline.  The lungs are symmetrically expanded bilaterally with coarse interstitial attenuation bilaterally, more conspicuous than on the previous exam.  The process appears more focal projected over the right lower lung zone and right midlung zone.  There is bilateral basilar subsegmental atelectasis..  There is a focus of increased parenchymal attenuation projected over the right upper lung zone, please see CT chest 09/18/2024.  There is no pneumothorax.  The osseous structures are remarkable for degenerative change..                                       Medications   albuterol-ipratropium 2.5 mg-0.5 mg/3 mL  nebulizer solution 3 mL (3 mLs Nebulization Given 9/24/24 6227)     Medical Decision Making  Amount and/or Complexity of Data Reviewed  Labs: ordered. Decision-making details documented in ED Course.  Radiology: ordered.  ECG/medicine tests:  Decision-making details documented in ED Course.    Risk  Prescription drug management.    69-year-old male with history of prostate cancer, lung cancer, COPD, and diabetes presents with complaint of shortness of breath.  Patient does have a history of wearing oxygen at home.  Initial vitals of 75% at the clinic when patient was on room air.  Patient's saturation did improve after administration of BiPAP and transitioned back to nasal cannula.  Patient received additional breathing treatments here.     Patient has not no leukocytosis, hemoglobin 11.4.  No significant electrolyte abnormalities, however glucose is elevated at 274.  BNP normal, troponin slightly elevated.  We will order a repeat.  Patient's VBG with a slightly elevated pH at 7.5, explained by low carbon dioxide at 29.1.  Chest x-ray concerning for some edema, however COVID is negative procalcitonin is 1.  Ultimate decision made to bring patient in the hospital medicine for further management and observation.           ED Course as of 09/24/24 1645   Tue Sep 24, 2024   1330 EKG 12-lead  Independent interpretation:  Normal sinus rhythm at rate of 93 with sinus arrhythmia present, no STEMI.  There is motion artifact present. [AK]   1402 POCT COVID-19 Rapid Screening(!)  Reviewed.  Positive for COVID-19 infection. [AK]   1402 POCT Influenza A/B Molecular  Reviewed.  Negative. [AK]   1417 Troponin I(!)  Mildly elevated troponin, unknown baseline; will time to trend [DR]   1418 CBC auto differential(!)  No leukocytosis [DR]   1418 Brain natriuretic peptide  Reviewed   [DR]   1419 ISTAT PROCEDURE(!)  Mild alkalosis; CO2 consistent with tachypnea on arrival [DR]   1419 Comprehensive metabolic panel(!)  No significant  abnormalities [DR]      ED Course User Index  [AK] Elizabeth Good MD  [DR] Anna Owens DO                           Clinical Impression:  Final diagnoses:  [R06.02] Shortness of breath  [U07.1] COVID-19 virus infection (Primary)  [C34.90] Malignant neoplasm of lung, unspecified laterality, unspecified part of lung  [J44.9] Chronic obstructive pulmonary disease, unspecified COPD type          ED Disposition Condition    Observation Stable                Anna Owens DO  Resident  09/24/24 6616

## 2024-09-25 LAB
ALBUMIN SERPL BCP-MCNC: 2.5 G/DL (ref 3.5–5.2)
ALP SERPL-CCNC: 81 U/L (ref 55–135)
ALT SERPL W/O P-5'-P-CCNC: 20 U/L (ref 10–44)
ANION GAP SERPL CALC-SCNC: 10 MMOL/L (ref 8–16)
AST SERPL-CCNC: 19 U/L (ref 10–40)
BASOPHILS # BLD AUTO: 0.01 K/UL (ref 0–0.2)
BASOPHILS NFR BLD: 0.2 % (ref 0–1.9)
BILIRUB SERPL-MCNC: 0.2 MG/DL (ref 0.1–1)
BUN SERPL-MCNC: 33 MG/DL (ref 8–23)
CALCIUM SERPL-MCNC: 9.3 MG/DL (ref 8.7–10.5)
CHLORIDE SERPL-SCNC: 102 MMOL/L (ref 95–110)
CO2 SERPL-SCNC: 25 MMOL/L (ref 23–29)
CREAT SERPL-MCNC: 1.4 MG/DL (ref 0.5–1.4)
DIFFERENTIAL METHOD BLD: ABNORMAL
EOSINOPHIL # BLD AUTO: 0 K/UL (ref 0–0.5)
EOSINOPHIL NFR BLD: 0 % (ref 0–8)
ERYTHROCYTE [DISTWIDTH] IN BLOOD BY AUTOMATED COUNT: 14.3 % (ref 11.5–14.5)
EST. GFR  (NO RACE VARIABLE): 54 ML/MIN/1.73 M^2
ESTIMATED AVG GLUCOSE: 209 MG/DL (ref 68–131)
ESTIMATED AVG GLUCOSE: 209 MG/DL (ref 68–131)
FERRITIN SERPL-MCNC: 329 NG/ML (ref 20–300)
GLUCOSE SERPL-MCNC: 305 MG/DL (ref 70–110)
HBA1C MFR BLD: 8.9 % (ref 4–5.6)
HBA1C MFR BLD: 8.9 % (ref 4–5.6)
HCT VFR BLD AUTO: 37.1 % (ref 40–54)
HGB BLD-MCNC: 11.7 G/DL (ref 14–18)
IMM GRANULOCYTES # BLD AUTO: 0.14 K/UL (ref 0–0.04)
IMM GRANULOCYTES NFR BLD AUTO: 2.2 % (ref 0–0.5)
LYMPHOCYTES # BLD AUTO: 0.5 K/UL (ref 1–4.8)
LYMPHOCYTES NFR BLD: 8.6 % (ref 18–48)
MAGNESIUM SERPL-MCNC: 2.1 MG/DL (ref 1.6–2.6)
MCH RBC QN AUTO: 27.6 PG (ref 27–31)
MCHC RBC AUTO-ENTMCNC: 31.5 G/DL (ref 32–36)
MCV RBC AUTO: 88 FL (ref 82–98)
MONOCYTES # BLD AUTO: 0.3 K/UL (ref 0.3–1)
MONOCYTES NFR BLD: 5.1 % (ref 4–15)
NEUTROPHILS # BLD AUTO: 5.3 K/UL (ref 1.8–7.7)
NEUTROPHILS NFR BLD: 83.9 % (ref 38–73)
NRBC BLD-RTO: 0 /100 WBC
OHS QRS DURATION: 172 MS
OHS QTC CALCULATION: 636 MS
PLATELET # BLD AUTO: 278 K/UL (ref 150–450)
PMV BLD AUTO: 10.2 FL (ref 9.2–12.9)
POCT GLUCOSE: 236 MG/DL (ref 70–110)
POCT GLUCOSE: 327 MG/DL (ref 70–110)
POCT GLUCOSE: 345 MG/DL (ref 70–110)
POCT GLUCOSE: 355 MG/DL (ref 70–110)
POCT GLUCOSE: 395 MG/DL (ref 70–110)
POTASSIUM SERPL-SCNC: 4.9 MMOL/L (ref 3.5–5.1)
PROT SERPL-MCNC: 7.2 G/DL (ref 6–8.4)
RBC # BLD AUTO: 4.24 M/UL (ref 4.6–6.2)
SODIUM SERPL-SCNC: 137 MMOL/L (ref 136–145)
TROPONIN I SERPL DL<=0.01 NG/ML-MCNC: 0.06 NG/ML (ref 0–0.03)
WBC # BLD AUTO: 6.29 K/UL (ref 3.9–12.7)

## 2024-09-25 PROCEDURE — 99900035 HC TECH TIME PER 15 MIN (STAT)

## 2024-09-25 PROCEDURE — 96375 TX/PRO/DX INJ NEW DRUG ADDON: CPT

## 2024-09-25 PROCEDURE — 36415 COLL VENOUS BLD VENIPUNCTURE: CPT | Performed by: NURSE PRACTITIONER

## 2024-09-25 PROCEDURE — 96361 HYDRATE IV INFUSION ADD-ON: CPT

## 2024-09-25 PROCEDURE — 25000003 PHARM REV CODE 250: Performed by: NURSE PRACTITIONER

## 2024-09-25 PROCEDURE — 25000242 PHARM REV CODE 250 ALT 637 W/ HCPCS: Performed by: NURSE PRACTITIONER

## 2024-09-25 PROCEDURE — 63600175 PHARM REV CODE 636 W HCPCS: Mod: JZ,TB | Performed by: NURSE PRACTITIONER

## 2024-09-25 PROCEDURE — 96365 THER/PROPH/DIAG IV INF INIT: CPT

## 2024-09-25 PROCEDURE — 85025 COMPLETE CBC W/AUTO DIFF WBC: CPT | Performed by: NURSE PRACTITIONER

## 2024-09-25 PROCEDURE — 96372 THER/PROPH/DIAG INJ SC/IM: CPT | Performed by: NURSE PRACTITIONER

## 2024-09-25 PROCEDURE — 80053 COMPREHEN METABOLIC PANEL: CPT | Performed by: NURSE PRACTITIONER

## 2024-09-25 PROCEDURE — 94761 N-INVAS EAR/PLS OXIMETRY MLT: CPT

## 2024-09-25 PROCEDURE — 94640 AIRWAY INHALATION TREATMENT: CPT | Mod: XB

## 2024-09-25 PROCEDURE — 63600175 PHARM REV CODE 636 W HCPCS: Performed by: PHYSICIAN ASSISTANT

## 2024-09-25 PROCEDURE — 83036 HEMOGLOBIN GLYCOSYLATED A1C: CPT | Performed by: NURSE PRACTITIONER

## 2024-09-25 PROCEDURE — 84484 ASSAY OF TROPONIN QUANT: CPT | Performed by: NURSE PRACTITIONER

## 2024-09-25 PROCEDURE — 27000207 HC ISOLATION

## 2024-09-25 PROCEDURE — 21400001 HC TELEMETRY ROOM

## 2024-09-25 PROCEDURE — 27000221 HC OXYGEN, UP TO 24 HOURS

## 2024-09-25 PROCEDURE — 83735 ASSAY OF MAGNESIUM: CPT | Performed by: NURSE PRACTITIONER

## 2024-09-25 RX ORDER — INSULIN ASPART 100 [IU]/ML
5 INJECTION, SOLUTION INTRAVENOUS; SUBCUTANEOUS
Status: DISCONTINUED | OUTPATIENT
Start: 2024-09-25 | End: 2024-09-26 | Stop reason: HOSPADM

## 2024-09-25 RX ORDER — ENOXAPARIN SODIUM 100 MG/ML
40 INJECTION SUBCUTANEOUS EVERY 24 HOURS
Status: DISCONTINUED | OUTPATIENT
Start: 2024-09-26 | End: 2024-09-26 | Stop reason: HOSPADM

## 2024-09-25 RX ORDER — INSULIN GLARGINE 100 [IU]/ML
22 INJECTION, SOLUTION SUBCUTANEOUS NIGHTLY
Status: DISCONTINUED | OUTPATIENT
Start: 2024-09-25 | End: 2024-09-26 | Stop reason: HOSPADM

## 2024-09-25 RX ADMIN — DOCUSATE SODIUM AND SENNOSIDES 1 TABLET: 8.6; 5 TABLET, FILM COATED ORAL at 08:09

## 2024-09-25 RX ADMIN — SODIUM CHLORIDE, SODIUM LACTATE, POTASSIUM CHLORIDE, AND CALCIUM CHLORIDE: 600; 310; 30; 20 INJECTION, SOLUTION INTRAVENOUS at 01:09

## 2024-09-25 RX ADMIN — ACETAMINOPHEN 650 MG: 325 TABLET, FILM COATED ORAL at 09:09

## 2024-09-25 RX ADMIN — SODIUM CHLORIDE, SODIUM LACTATE, POTASSIUM CHLORIDE, AND CALCIUM CHLORIDE: 600; 310; 30; 20 INJECTION, SOLUTION INTRAVENOUS at 10:09

## 2024-09-25 RX ADMIN — OXYCODONE HYDROCHLORIDE AND ACETAMINOPHEN 500 MG: 500 TABLET ORAL at 08:09

## 2024-09-25 RX ADMIN — INSULIN ASPART 4 UNITS: 100 INJECTION, SOLUTION INTRAVENOUS; SUBCUTANEOUS at 01:09

## 2024-09-25 RX ADMIN — BICALUTAMIDE 50 MG: 50 TABLET, FILM COATED ORAL at 10:09

## 2024-09-25 RX ADMIN — SODIUM CHLORIDE, SODIUM LACTATE, POTASSIUM CHLORIDE, AND CALCIUM CHLORIDE: 600; 310; 30; 20 INJECTION, SOLUTION INTRAVENOUS at 11:09

## 2024-09-25 RX ADMIN — THERA TABS 1 TABLET: TAB at 08:09

## 2024-09-25 RX ADMIN — TAMSULOSIN HYDROCHLORIDE 0.4 MG: 0.4 CAPSULE ORAL at 08:09

## 2024-09-25 RX ADMIN — INSULIN ASPART 1 UNITS: 100 INJECTION, SOLUTION INTRAVENOUS; SUBCUTANEOUS at 10:09

## 2024-09-25 RX ADMIN — INSULIN ASPART 5 UNITS: 100 INJECTION, SOLUTION INTRAVENOUS; SUBCUTANEOUS at 06:09

## 2024-09-25 RX ADMIN — ALBUTEROL SULFATE 2 PUFF: 90 AEROSOL, METERED RESPIRATORY (INHALATION) at 07:09

## 2024-09-25 RX ADMIN — INSULIN ASPART 5 UNITS: 100 INJECTION, SOLUTION INTRAVENOUS; SUBCUTANEOUS at 01:09

## 2024-09-25 RX ADMIN — ATORVASTATIN CALCIUM 20 MG: 20 TABLET, FILM COATED ORAL at 08:09

## 2024-09-25 RX ADMIN — DEXAMETHASONE 6 MG: 4 TABLET ORAL at 08:09

## 2024-09-25 RX ADMIN — QUETIAPINE FUMARATE 300 MG: 200 TABLET ORAL at 09:09

## 2024-09-25 RX ADMIN — OXYCODONE HYDROCHLORIDE AND ACETAMINOPHEN 500 MG: 500 TABLET ORAL at 09:09

## 2024-09-25 RX ADMIN — REMDESIVIR 100 MG: 100 INJECTION, POWDER, LYOPHILIZED, FOR SOLUTION INTRAVENOUS at 10:09

## 2024-09-25 RX ADMIN — FLUTICASONE FUROATE AND VILANTEROL TRIFENATATE 1 PUFF: 200; 25 POWDER RESPIRATORY (INHALATION) at 07:09

## 2024-09-25 RX ADMIN — INSULIN ASPART 4 UNITS: 100 INJECTION, SOLUTION INTRAVENOUS; SUBCUTANEOUS at 08:09

## 2024-09-25 RX ADMIN — INSULIN GLARGINE 22 UNITS: 100 INJECTION, SOLUTION SUBCUTANEOUS at 10:09

## 2024-09-25 RX ADMIN — GUAIFENESIN AND DEXTROMETHORPHAN 10 ML: 100; 10 SYRUP ORAL at 09:09

## 2024-09-25 RX ADMIN — HUMAN INSULIN 5 UNITS: 100 INJECTION, SOLUTION SUBCUTANEOUS at 02:09

## 2024-09-25 RX ADMIN — ALBUTEROL SULFATE 2 PUFF: 90 AEROSOL, METERED RESPIRATORY (INHALATION) at 04:09

## 2024-09-25 RX ADMIN — ENOXAPARIN SODIUM 80 MG: 100 INJECTION SUBCUTANEOUS at 08:09

## 2024-09-25 RX ADMIN — ALBUTEROL SULFATE 2 PUFF: 90 AEROSOL, METERED RESPIRATORY (INHALATION) at 12:09

## 2024-09-25 NOTE — ASSESSMENT & PLAN NOTE
-chronic   -hemoglobin A1c pending in AM   -hold home metformin, Tresiba, Trulicity   -begin dose reduced basal insulin with low-dose SSI  -dose/medication adjustment as appropriate   -monitor accuchecks AC/HS and PRN hypoglycemic protocol

## 2024-09-25 NOTE — PLAN OF CARE
Patient had an incident of IV ripping out, and blood went everywhere patient was cleaned up and MD notified. Patient free of falls, call light in reach, bed alarm set, X2 side rails up. Patient repositions independently. Pain managed with ordered medication. IVF/ABX administered as ordered. VSS other than sugars, MD aware. Chart Reviewed.    Problem: Adult Inpatient Plan of Care  Goal: Plan of Care Review  Outcome: Progressing  Goal: Patient-Specific Goal (Individualized)  Outcome: Progressing  Goal: Absence of Hospital-Acquired Illness or Injury  Outcome: Progressing  Goal: Optimal Comfort and Wellbeing  Outcome: Progressing  Goal: Readiness for Transition of Care  Outcome: Progressing     Problem: Diabetes Comorbidity  Goal: Blood Glucose Level Within Targeted Range  Outcome: Progressing     Problem: Fall Injury Risk  Goal: Absence of Fall and Fall-Related Injury  Outcome: Progressing

## 2024-09-25 NOTE — NURSING
Patient IV was pulled out, blood went everywhere, MD notified of incident, new IV started, patient cleaned up

## 2024-09-25 NOTE — PLAN OF CARE
MOON Message    Medicare Outpatient and Observation Notification regarding financial responsibilityOther (comments)Medicare Outpatient and Observation Notification regarding financial responsibility. Other (comments). The comment is Patient unable to sign MOON due to Medical Condition. Taken on 9/25/24 1025Date STERN was signed9/25/2024Time STERN was [id number]

## 2024-09-25 NOTE — H&P
[facility] Medicine  History & Physical    Patient Name: Lna Robles  MRN: [id number]  Patient Class: OP- Observation  Admission Date: 9/24/2024  Attending Physician: DARRICK Mauricio MD   Primary Care Provider: Matthew Heath III, MD    Patient information was obtained from patient, relative(s), past medical records, and ER records.     Subjective:     Principal Problem:COVID-19 virus infection    Chief Complaint:   Chief Complaint   Patient presents with    Shortness of Breath     Came in from pcp clinic per EMS, reports of sob 75% RA, 81% on 3L. Placed on bipap, now 100%. HR ranging from 80s-120s. Hx of copd, emphysema, and lung CA        HPI: Mr. Robles is a 69 YOM with PMHx of DM type II, COPD, chronic respiratory failure on home oxygen, former smoker, RLL lung adenocarcinoma, prostate cancer s/p radiation on casodex, and renal mass under surveillance.     Per Oncology notes:  Stage IA3 (cT1c cN0 M0) RLL NSCLC (adeno) diagnosed on robotic bronch w/ EBUS 8/12/22. Biopsy of a RUL nodule, stations 7 and 11L/R nodes were negative for malignancy. PET/CT 9/6/22 demonstrated mild uptake in RLL nodule only. He completed definitive SBRT 55 Gy in 5 fx on 9/27/22.  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 Bro. Biopsy of an FDG avid RLL nodule was negative for malignancy; however, a 2.0 cm GGO in the RUL revealed lepidic adenocarcinoma. He completed definitive SBRT 50 Gy in 4 fx on 3/4/24.  History significant for high risk prostate cancer s/p EBRT 70 Gy in 28 fx +ADT with Dr. Villalobos completed 11/2020.      He presents to ED via EMS from PCP office due to being found hypoxemic in clinic today.  EMS reports patient had saturations mid 80s on room air with improvement after DuoNebs and CPAP placement; of note he did not have portable home oxygen with him at clinic visit.  Mr. Robles notes shortness of breath  for the last several days with malaise, fatigue, chills, subjective fever, chest congestion, productive cough with viscous yellow phlegm, decreased appetite, and increased thirst. He denies CP, abdominal pain, N/V/D, constipation, urinary symptoms or hematuria, decreased UOP, light headiness, or headaches. He has been noncompliant with home medications, including DM meds, since he began feeling unwell. He lives alone, family is nearby to assist if needed; is independent in ADLs, uses no ambulatory aids.    In the ED he is hypertensive, heart rate stable, saturations 75% on room air with improvement after BiPAP and was ultimately weaned to nasal cannula, and afebrile.  CBC with WBC 6, H/H 11.4/38, platelets 241.  Chemistry was , K 4.1, chloride 107, CO2 20, BUN 21, SCr 1.4, glucose 274.  Magnesium 1.7.  LFTs unremarkable.  BNP 38.  Troponin 0.043 >> 0.095.  Procalcitonin 1.04.  Lactic acid 1.61 >> 2.8.  , , ESR > 120.  COVID positive, flu negative.  CXR with 1. Pulmonary findings suggest edema noting the process is more focal projected over the right lower lung zone and right upper lung zone, developing consolidation not excluded.  Clinical correlation is advised. 2. Patient has known right pulmonary malignancy, this is better evaluated on CT 09/18/2024.    The patient was placed in observation to the Hospital Medicine Service for further evaluation and treatment.     Past Medical History:   Diagnosis Date    COPD (chronic obstructive pulmonary disease)     Diabetes mellitus     Prostate cancer        Past Surgical History:   Procedure Laterality Date    ROBOTIC BRONCHOSCOPY N/A 8/12/2022    Procedure: ROBOTIC BRONCHOSCOPY;  Surgeon: Laney Bro MD;  Location: [facility] OR [address] [place] [address];  Service: Pulmonary;  Laterality: N/A;    ROBOTIC BRONCHOSCOPY N/A 2/2/2024    Procedure: ROBOTIC BRONCHOSCOPY;  Surgeon: Laney Bro MD;  Location: [facility] OR [address] [place] [address];  Service: Pulmonary;  Laterality: N/A;       Review  "of patient's allergies indicates:  No Known Allergies    No current facility-administered medications on file prior to encounter.     Current Outpatient Medications on File Prior to Encounter   Medication Sig    albuterol (PROVENTIL/VENTOLIN HFA) 90 mcg/actuation inhaler Inhale 2 puffs into the lungs every 6 (six) hours as needed.    albuterol (PROVENTIL/VENTOLIN HFA) 90 mcg/actuation inhaler 1 puff as needed    BD BRENNA 2ND GEN PEN NEEDLE 32 gauge x 5/32" Ndle USE TO INJECT INSULIN UNDER THE SKIN EVERY DAY    bicalutamide (CASODEX) 50 MG Tab Take 1 tablet (50 mg total) by mouth once daily.    cholecalciferol, vitamin D3, 1,250 mcg (50,000 unit) capsule TAKE 1 CAPSULE BY MOUTH 1 TIME EVERY WEEK    dulaglutide (TRULICITY SUBQ) Inject into the skin.    ferrous sulfate (IRON, FERROUS SULFATE,) 325 mg (65 mg iron) Tab tablet Take 1 tablet (325 mg total) by mouth every other day. (Patient not taking: Reported on 2/15/2024)    fluticasone propionate (FLONASE) 50 mcg/actuation nasal spray     hydrOXYzine HCL (ATARAX) 25 MG tablet TAKE 1 TABLET BY MOUTH EVERY DAY AT BEDTIME AS NEEDED Oral for 30 Days    levocetirizine (XYZAL) 5 MG tablet Take 1 tablet (5 mg total) by mouth every evening. (Patient not taking: Reported on 11/9/2022)    metFORMIN (GLUCOPHAGE) 1000 MG tablet     prednisoLONE acetate (PRED FORTE) 1 % DrpS SHAKE LIQUID AND INSTILL 1 DROP IN LEFT EYE FOUR TIMES DAILY AFTER SURGERY    QUEtiapine (SEROQUEL) 300 MG Tab Take 300 mg by mouth every evening.    simvastatin (ZOCOR) 40 MG tablet Take 1 tablet by mouth.    tamsulosin (FLOMAX) 0.4 mg Cap Take 1 capsule by mouth.    TRESIBA FLEXTOUCH U-100 100 unit/mL (3 mL) InPn INJECT 20 UNITS UNDER THE SKIN ONCE DAILY    umeclidinium-vilanteroL (ANORO ELLIPTA) 62.5-25 mcg/actuation DsDv Inhale 1 puff into the lungs once daily. Controller     Family History       Problem Relation (Age of Onset)    Cancer Father    Diabetes Mother          Tobacco Use    Smoking status: " Former     Current packs/day: 0.00     Types: Cigarettes     Start date:      Quit date: 2022     Years since quittin.4    Smokeless tobacco: Never   Substance and Sexual Activity    Alcohol use: Yes    Drug use: Never    Sexual activity: Yes     Review of Systems   Constitutional:  Positive for activity change, appetite change, chills, fatigue and fever (subjective). Negative for diaphoresis.   HENT:  Positive for congestion. Negative for sore throat and trouble swallowing.    Respiratory:  Positive for cough and shortness of breath. Negative for chest tightness and wheezing.    Cardiovascular:  Negative for chest pain, palpitations and leg swelling.   Gastrointestinal:  Negative for abdominal distention, abdominal pain, constipation, diarrhea, nausea and vomiting.   Genitourinary:  Negative for decreased urine volume, difficulty urinating, dysuria and urgency.   Musculoskeletal:  Negative for arthralgias and back pain.   Neurological:  Positive for weakness. Negative for dizziness, syncope, light-headedness and headaches.     Objective:     Vital Signs (Most Recent):  Temp: 98.4 °F (36.9 °C) (24)  Pulse: 91 (24)  Resp: (!) 22 (24)  BP: (!) 142/81 (24)  SpO2: (!) 93 % (24) Vital Signs (24h Range):  Temp:  [97.9 °F (36.6 °C)-98.4 °F (36.9 °C)] 98.4 °F (36.9 °C)  Pulse:  [76-97] 91  Resp:  [18-45] 22  SpO2:  [93 %-100 %] 93 %  BP: (123-153)/() 142/81     Weight: 78.9 kg (174 lb 0.3 oz)  Body mass index is 25.33 kg/m².     Physical Exam  Vitals and nursing note reviewed.   Constitutional:       General: He is not in acute distress.     Appearance: Normal appearance. He is well-developed and normal weight. He is not toxic-appearing.   HENT:      Head: Normocephalic and atraumatic.      Mouth/Throat:      Dentition: Normal dentition.   Eyes:      General: Lids are normal.      Extraocular Movements: Extraocular movements intact.       Conjunctiva/sclera: Conjunctivae normal.   Cardiovascular:      Rate and Rhythm: Normal rate and regular rhythm.      Heart sounds: Normal heart sounds. No murmur heard.  Pulmonary:      Effort: Pulmonary effort is normal.      Comments: Resting on nasal cannula.  No conversational dyspnea or increased work of breathing.  Nonproductive cough noted during interview/exam.  Decreased breath sounds bilateral bases.  No wheezing.  Abdominal:      Palpations: Abdomen is soft.   Musculoskeletal:      Cervical back: Neck supple.      Right lower leg: No edema.      Left lower leg: No edema.   Skin:     General: Skin is warm and dry.      Findings: No erythema or rash.   Neurological:      Mental Status: He is alert and oriented to person, place, and time.             Significant Labs: All pertinent labs within the past 24 hours have been reviewed.  CBC:   Recent Labs   Lab 09/24/24  1306 09/24/24  1308   WBC 6.21  --    HGB 11.4*  --    HCT 37.8* 37     --      CMP:   Recent Labs   Lab 09/24/24  1306      K 4.1      CO2 20*   *   BUN 21   CREATININE 1.4   CALCIUM 8.4*   PROT 6.5   ALBUMIN 2.3*   BILITOT 0.2   ALKPHOS 79   AST 18   ALT 17   ANIONGAP 12     Lactic Acid:   Recent Labs   Lab 09/24/24  2043   LACTATE 2.8*     Magnesium:   Recent Labs   Lab 09/24/24  1306   MG 1.7     Troponin:   Recent Labs   Lab 09/24/24  1306 09/24/24  2043   TROPONINI 0.043* 0.095*     Significant Imaging: I have reviewed all pertinent imaging results/findings within the past 24 hours.  Assessment/Plan:     * COVID-19 virus infection  Primary malignant neoplasm of right upper lobe of lung   Centrilobular emphysema   Chronic hypoxic respiratory failure on home oxygen therapy  Former smoker  Elevated troponin  -admitted due to symptomatic covid infection in setting of known RLL malignant neoplasm, COPD, and chronic respiratory failure on home oxygen  -at admission hypertensive, heart rate stable, afebrile, saturations  75% on room air with improvement after BiPAP which was ultimately weaned to nasal cannula  -ED workup detailed above  -high risk for severe complications of COVID 19 based on COVID risk score of 5   -troponin elevated at admission, no priors for comparison >> suspect related to demand in setting of acute illness and HTN   -troponin 0.043 >> 0.095, continue trending   -EKG PRN concerns  -initiate standard COVID protocol with isolation- respiratory, contact and droplet per protocol  -initiate targeted therapy with remdesivir, dexamethasone, multivitamin, and vitamin-C    -dose/medication adjustment as appropriate   -continue home inhaler per pharmacy formulary alternative  -albuterol inhaler scheduled   -DuoNebs PRN   -anticoagulation with full-dose Lovenox   -hydration with IV fluids   -pulmonary toileting with incentive spirometer   -continue oxygen supplementation  -continue tele monitoring   -strict I&Os   -trend labs, address/replete electrolytes as indicated  -further PRN supportive care is indicated    Diabetes mellitus, type 2  -chronic   -hemoglobin A1c pending in AM   -hold home metformin, Tresiba, Trulicity   -begin dose reduced basal insulin with low-dose SSI  -dose/medication adjustment as appropriate   -monitor accuchecks AC/HS and PRN hypoglycemic protocol     Prostate cancer  -history of   -continue home Casodex    Renal mass  -history of   -under surveillance by Urology  -avoid nephrotoxins and hypotension as able, renally dose medications      VTE Risk Mitigation (From admission, onward)           Ordered     enoxaparin injection 80 mg  2 times daily         09/24/24 1828     IP VTE HIGH RISK PATIENT  Once         09/24/24 1828     Place sequential compression device  Until discontinued         09/24/24 1828                  On 09/24/2024, patient placed in hospital observation services.      Linda Bass, DNP, AG-ACNP, BC  Department of Hospital Medicine  [facility]

## 2024-09-25 NOTE — SUBJECTIVE & OBJECTIVE
"Past Medical History:   Diagnosis Date    COPD (chronic obstructive pulmonary disease)     Diabetes mellitus     Prostate cancer        Past Surgical History:   Procedure Laterality Date    ROBOTIC BRONCHOSCOPY N/A 8/12/2022    Procedure: ROBOTIC BRONCHOSCOPY;  Surgeon: Laney Bro MD;  Location: [address];  Service: Pulmonary;  Laterality: N/A;    ROBOTIC BRONCHOSCOPY N/A 2/2/2024    Procedure: ROBOTIC BRONCHOSCOPY;  Surgeon: Laney Bro MD;  Location: [address];  Service: Pulmonary;  Laterality: N/A;       Review of patient's allergies indicates:  No Known Allergies    No current facility-administered medications on file prior to encounter.     Current Outpatient Medications on File Prior to Encounter   Medication Sig    albuterol (PROVENTIL/VENTOLIN HFA) 90 mcg/actuation inhaler Inhale 2 puffs into the lungs every 6 (six) hours as needed.    albuterol (PROVENTIL/VENTOLIN HFA) 90 mcg/actuation inhaler 1 puff as needed    BD BRENNA 2ND GEN PEN NEEDLE 32 gauge x 5/32" Ndle USE TO INJECT INSULIN UNDER THE SKIN EVERY DAY    bicalutamide (CASODEX) 50 MG Tab Take 1 tablet (50 mg total) by mouth once daily.    cholecalciferol, vitamin D3, 1,250 mcg (50,000 unit) capsule TAKE 1 CAPSULE BY MOUTH 1 TIME EVERY WEEK    dulaglutide (TRULICITY SUBQ) Inject into the skin.    ferrous sulfate (IRON, FERROUS SULFATE,) 325 mg (65 mg iron) Tab tablet Take 1 tablet (325 mg total) by mouth every other day. (Patient not taking: Reported on 2/15/2024)    fluticasone propionate (FLONASE) 50 mcg/actuation nasal spray     hydrOXYzine HCL (ATARAX) 25 MG tablet TAKE 1 TABLET BY MOUTH EVERY DAY AT BEDTIME AS NEEDED Oral for 30 Days    levocetirizine (XYZAL) 5 MG tablet Take 1 tablet (5 mg total) by mouth every evening. (Patient not taking: Reported on 11/9/2022)    metFORMIN (GLUCOPHAGE) 1000 MG tablet     prednisoLONE acetate (PRED FORTE) 1 % DrpS SHAKE LIQUID AND INSTILL 1 DROP IN LEFT EYE FOUR TIMES DAILY AFTER SURGERY    " QUEtiapine (SEROQUEL) 300 MG Tab Take 300 mg by mouth every evening.    simvastatin (ZOCOR) 40 MG tablet Take 1 tablet by mouth.    tamsulosin (FLOMAX) 0.4 mg Cap Take 1 capsule by mouth.    TRESIBA FLEXTOUCH U-100 100 unit/mL (3 mL) InPn INJECT 20 UNITS UNDER THE SKIN ONCE DAILY    umeclidinium-vilanteroL (ANORO ELLIPTA) 62.5-25 mcg/actuation DsDv Inhale 1 puff into the lungs once daily. Controller     Family History       Problem Relation (Age of Onset)    Cancer Father    Diabetes Mother          Tobacco Use    Smoking status: Former     Current packs/day: 0.00     Types: Cigarettes     Start date:      Quit date: 2022     Years since quittin.4    Smokeless tobacco: Never   Substance and Sexual Activity    Alcohol use: Yes    Drug use: Never    Sexual activity: Yes     Review of Systems   Constitutional:  Positive for activity change, appetite change, chills, fatigue and fever (subjective). Negative for diaphoresis.   HENT:  Positive for congestion. Negative for sore throat and trouble swallowing.    Respiratory:  Positive for cough and shortness of breath. Negative for chest tightness and wheezing.    Cardiovascular:  Negative for chest pain, palpitations and leg swelling.   Gastrointestinal:  Negative for abdominal distention, abdominal pain, constipation, diarrhea, nausea and vomiting.   Genitourinary:  Negative for decreased urine volume, difficulty urinating, dysuria and urgency.   Musculoskeletal:  Negative for arthralgias and back pain.   Neurological:  Positive for weakness. Negative for dizziness, syncope, light-headedness and headaches.     Objective:     Vital Signs (Most Recent):  Temp: 98.4 °F (36.9 °C) (24)  Pulse: 91 (24)  Resp: (!) 22 (24)  BP: (!) 142/81 (24)  SpO2: (!) 93 % (24) Vital Signs (24h Range):  Temp:  [97.9 °F (36.6 °C)-98.4 °F (36.9 °C)] 98.4 °F (36.9 °C)  Pulse:  [76-97] 91  Resp:  [18-45] 22  SpO2:  [93 %-100 %] 93  %  BP: (123-153)/() 142/81     Weight: 78.9 kg (174 lb 0.3 oz)  Body mass index is 25.33 kg/m².     Physical Exam  Vitals and nursing note reviewed.   Constitutional:       General: He is not in acute distress.     Appearance: Normal appearance. He is well-developed and normal weight. He is not toxic-appearing.   HENT:      Head: Normocephalic and atraumatic.      Mouth/Throat:      Dentition: Normal dentition.   Eyes:      General: Lids are normal.      Extraocular Movements: Extraocular movements intact.      Conjunctiva/sclera: Conjunctivae normal.   Cardiovascular:      Rate and Rhythm: Normal rate and regular rhythm.      Heart sounds: Normal heart sounds. No murmur heard.  Pulmonary:      Effort: Pulmonary effort is normal.      Comments: Resting on nasal cannula.  No conversational dyspnea or increased work of breathing.  Nonproductive cough noted during interview/exam.  Decreased breath sounds bilateral bases.  No wheezing.  Abdominal:      Palpations: Abdomen is soft.   Musculoskeletal:      Cervical back: Neck supple.      Right lower leg: No edema.      Left lower leg: No edema.   Skin:     General: Skin is warm and dry.      Findings: No erythema or rash.   Neurological:      Mental Status: He is alert and oriented to person, place, and time.             Significant Labs: All pertinent labs within the past 24 hours have been reviewed.  CBC:   Recent Labs   Lab 09/24/24  1306 09/24/24  1308   WBC 6.21  --    HGB 11.4*  --    HCT 37.8* 37     --      CMP:   Recent Labs   Lab 09/24/24  1306      K 4.1      CO2 20*   *   BUN 21   CREATININE 1.4   CALCIUM 8.4*   PROT 6.5   ALBUMIN 2.3*   BILITOT 0.2   ALKPHOS 79   AST 18   ALT 17   ANIONGAP 12     Lactic Acid:   Recent Labs   Lab 09/24/24  2043   LACTATE 2.8*     Magnesium:   Recent Labs   Lab 09/24/24  1306   MG 1.7     Troponin:   Recent Labs   Lab 09/24/24  1306 09/24/24  2043   TROPONINI 0.043* 0.095*     Significant  Imaging: I have reviewed all pertinent imaging results/findings within the past 24 hours.

## 2024-09-25 NOTE — SUBJECTIVE & OBJECTIVE
Interval History: Seen at bedside, reports improvement in fatigue/shortness of breath. Continue to wean oxygen as tolerated     Review of Systems   Constitutional:  Positive for activity change, appetite change, fatigue and fever (subjective).   HENT:  Positive for congestion.    Respiratory:  Positive for cough and shortness of breath. Negative for chest tightness.    Cardiovascular:  Negative for chest pain.   Gastrointestinal:  Negative for abdominal pain, diarrhea, nausea and vomiting.   Genitourinary:  Negative for difficulty urinating and dysuria.   Neurological:  Positive for weakness. Negative for dizziness and syncope.   Psychiatric/Behavioral:  Negative for agitation and confusion.      Objective:     Vital Signs (Most Recent):  Temp: 97.9 °F (36.6 °C) (09/25/24 1102)  Pulse: 76 (09/25/24 1215)  Resp: (!) 24 (09/25/24 1215)  BP: (!) 174/98 (09/25/24 1102)  SpO2: (!) 91 % (09/25/24 1102) Vital Signs (24h Range):  Temp:  [97.9 °F (36.6 °C)-98.4 °F (36.9 °C)] 97.9 °F (36.6 °C)  Pulse:  [] 76  Resp:  [17-45] 24  SpO2:  [90 %-100 %] 91 %  BP: (117-174)/() 174/98     Weight: 78.9 kg (174 lb 0.3 oz)  Body mass index is 25.33 kg/m².    Intake/Output Summary (Last 24 hours) at 9/25/2024 1400  Last data filed at 9/25/2024 0645  Gross per 24 hour   Intake 2765 ml   Output 350 ml   Net 2415 ml         Physical Exam  Vitals and nursing note reviewed.   Constitutional:       General: He is not in acute distress.     Appearance: Normal appearance. He is well-developed and normal weight.   HENT:      Head: Normocephalic and atraumatic.      Mouth/Throat:      Dentition: Normal dentition.   Eyes:      General: Lids are normal.      Extraocular Movements: Extraocular movements intact.   Cardiovascular:      Rate and Rhythm: Normal rate.   Pulmonary:      Effort: Pulmonary effort is normal. No respiratory distress.      Comments: Resting on nasal cannula.  No conversational dyspnea or increased work of breathing.   Nonproductive cough noted during interview/exam.  Decreased breath sounds bilateral bases.  No wheezing.  Musculoskeletal:      Right lower leg: No edema.      Left lower leg: No edema.   Skin:     General: Skin is warm and dry.   Neurological:      General: No focal deficit present.      Mental Status: He is alert.   Psychiatric:         Mood and Affect: Mood normal.         Behavior: Behavior normal.             Significant Labs: All pertinent labs within the past 24 hours have been reviewed.    Significant Imaging: I have reviewed all pertinent imaging results/findings within the past 24 hours.

## 2024-09-25 NOTE — HOSPITAL COURSE
Lan Robles was admitted for COVID. Started on COVID treatment protocol. Improved, weaned back to his home oxygen requirements. Stable for discharge with PCP follow up. Return precautions discussed, no further questions at discharge

## 2024-09-25 NOTE — ASSESSMENT & PLAN NOTE
Primary malignant neoplasm of right upper lobe of lung   Centrilobular emphysema   Chronic hypoxic respiratory failure on home oxygen therapy  Former smoker  Elevated troponin  -admitted due to symptomatic covid infection in setting of known RLL malignant neoplasm, COPD, and chronic respiratory failure on home oxygen  -at admission hypertensive, heart rate stable, afebrile, saturations 75% on room air with improvement after BiPAP which was ultimately weaned to nasal cannula  -ED workup detailed above  -high risk for severe complications of COVID 19 based on COVID risk score of 5   -troponin elevated at admission, no priors for comparison >> suspect related to demand in setting of acute illness and HTN   -troponin 0.043 >> 0.095, 0.055  -EKG PRN concerns  -initiate standard COVID protocol with isolation- respiratory, contact and droplet per protocol  -initiate targeted therapy with remdesivir, dexamethasone, multivitamin, and vitamin-C    -dose/medication adjustment as appropriate>> change from therapeutic lovenox to prophylactic given significant bleeding when IV became dislodged and patient is ambulatory  -continue home inhaler per pharmacy formulary alternative  -albuterol inhaler scheduled   -Juana PRN   -hydration with IV fluids   -pulmonary toileting with incentive spirometer   -continue oxygen supplementation  - weaned back to home 2L NC prior to discharge, symptoms improved; stable for discharge with PCP follow up, return precautions discussed, no further questions at discharge

## 2024-09-25 NOTE — ASSESSMENT & PLAN NOTE
-chronic   -hemoglobin A1c 8.9   -hold home metformin, Tresiba, Trulicity   -begin dose reduced basal insulin with low-dose SSI  -dose/medication adjustment as appropriate >> increasing insulin due to steroid induced hyperglycemia  -monitor accuchecks AC/HS and PRN hypoglycemic protocol

## 2024-09-25 NOTE — ASSESSMENT & PLAN NOTE
-history of   -under surveillance by Urology  -avoid nephrotoxins and hypotension as able, renally dose medications

## 2024-09-25 NOTE — ASSESSMENT & PLAN NOTE
Primary malignant neoplasm of right upper lobe of lung   Centrilobular emphysema   Chronic hypoxic respiratory failure on home oxygen therapy  Former smoker  -admitted due to symptomatic covid infection in setting of known RLL malignant neoplasm, COPD, and chronic respiratory failure on home oxygen  -at admission hypertensive, heart rate stable, afebrile, saturations 75% on room air with improvement after BiPAP which was ultimately weaned to nasal cannula  -ED workup detailed above  -high risk for severe complications of COVID 19 based on COVID risk score of 5   -initiate standard COVID protocol with isolation- respiratory, contact and droplet per protocol  -initiate targeted therapy with remdesivir, dexamethasone, multivitamin, and vitamin-C    -dose/medication adjustment as appropriate   -continue home inhaler per pharmacy formulary alternative  -albuterol inhaler scheduled   -Juana PRN   -anticoagulation with full-dose Lovenox   -hydration with IV fluids   -pulmonary toileting with incentive spirometer   -continue oxygen supplementation  -continue tele monitoring   -strict I&Os   -trend labs, address/replete electrolytes as indicated  -further PRN supportive care is indicated

## 2024-09-25 NOTE — PLAN OF CARE
LMSW met with the patient at the bedside. Patient is alert and oriented with no communication barriers. Prior to admission, the patient is independent. Patient denies the use of HH.  Patient has O2. Patients PCP is correct on the face sheet. Patient choice pharmacy is bedside. Patient's family will transport the patient home at discharge.     [facility] - Med Surg ([place])  Initial Discharge Assessment       Primary Care Provider: Matthew Heath III, MD    Admission Diagnosis: Shortness of breath [R06.02]  Malignant neoplasm of lung, unspecified laterality, unspecified part of lung [C34.90]  Chronic obstructive pulmonary disease, unspecified COPD type [J44.9]  COVID-19 virus infection [U07.1]    Admission Date: 9/24/2024  Expected Discharge Date:     Transition of Care Barriers: (P) None    Payor: [place] [employer] [facility] / Plan: [employer] / Product Type: Medicare Advantage /     Extended Emergency Contact Information  Primary Emergency Contact: NASRA FLOR  Mobile Phone: [phone]  Relation: Daughter  Preferred language: English   needed? No    Discharge Plan A: (P) Home, Home with family  Discharge Plan B: (P) Home Health      [id number] [place], [place] - [address] [facility] [address]  [address]  [facility] [address]  Phone: [phone] Fax: [phone]      Initial Assessment (most recent)       Adult Discharge Assessment - 09/25/24 0911          Discharge Assessment    Assessment Type Discharge Planning Assessment (P)      Confirmed/corrected address, phone number and insurance Yes (P)      Confirmed Demographics Correct on Facesheet (P)      Source of Information family;health record (P)      People in Home alone (P)      Do you expect to return to your current living situation? Yes (P)      Do you have help at home or someone to help you manage your care at home? Yes (P)      Prior to hospitilization cognitive status: Unable to Assess (P)       Current cognitive status: Unable to Assess (P)      Equipment Currently Used at Home oxygen (P)      Readmission within 30 days? No (P)      Patient currently being followed by outpatient case management? No (P)      Do you currently have service(s) that help you manage your care at home? No (P)      Do you take prescription medications? Yes (P)      Do you have prescription coverage? Yes (P)      Do you have any problems affording any of your prescribed medications? No (P)      Is the patient taking medications as prescribed? yes (P)      How do you get to doctors appointments? family or friend will provide;public transportation (P)      Are you on dialysis? No (P)    Patient is diabetic    Do you take coumadin? No (P)      Discharge Plan A Home;Home with family (P)      Discharge Plan B Home Health (P)      Discharge Plan discussed with: Patient (P)      Transition of Care Barriers None (P)

## 2024-09-25 NOTE — HPI
Mr. Robles is a 69 YOM with PMHx of DM type II, COPD, chronic respiratory failure on home oxygen, former smoker, RLL lung adenocarcinoma, prostate cancer s/p radiation on casodex, and renal mass under surveillance.     Per Oncology notes:  Stage IA3 (cT1c cN0 M0) RLL NSCLC (adeno) diagnosed on robotic bronch w/ EBUS 8/12/22. Biopsy of a RUL nodule, stations 7 and 11L/R nodes were negative for malignancy. PET/CT 9/6/22 demonstrated mild uptake in RLL nodule only. He completed definitive SBRT 55 Gy in 5 fx on 9/27/22.  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 Bro. Biopsy of an FDG avid RLL nodule was negative for malignancy; however, a 2.0 cm GGO in the RUL revealed lepidic adenocarcinoma. He completed definitive SBRT 50 Gy in 4 fx on 3/4/24.  History significant for high risk prostate cancer s/p EBRT 70 Gy in 28 fx +ADT with Dr. Villalobos completed 11/2020.      He presents to ED via EMS from PCP office due to being found hypoxemic in clinic today.  EMS reports patient had saturations mid 80s on room air with improvement after DuoNebs and CPAP placement; of note he did not have portable home oxygen with him at clinic visit.  Mr. Robles notes shortness of breath for the last several days with malaise, fatigue, chills, subjective fever, chest congestion, productive cough with viscous yellow phlegm, decreased appetite, and increased thirst. He denies CP, abdominal pain, N/V/D, constipation, urinary symptoms or hematuria, decreased UOP, light headiness, or headaches. He has been noncompliant with home medications, including DM meds, since he began feeling unwell. He lives alone, family is nearby to assist if needed; is independent in ADLs, uses no ambulatory aids.    In the ED he is hypertensive, heart rate stable, saturations 75% on room air with improvement after BiPAP and was ultimately weaned to nasal cannula, and afebrile.  CBC with  WBC 6, H/H 11.4/38, platelets 241.  Chemistry was , K 4.1, chloride 107, CO2 20, BUN 21, SCr 1.4, glucose 274.  Magnesium 1.7.  LFTs unremarkable.  BNP 38.  Troponin 0.043 >> 0.095.  Procalcitonin 1.04.  Lactic acid 1.61 >> 2.8.  , , ESR > 120.  COVID positive, flu negative.  CXR with 1. Pulmonary findings suggest edema noting the process is more focal projected over the right lower lung zone and right upper lung zone, developing consolidation not excluded.  Clinical correlation is advised. 2. Patient has known right pulmonary malignancy, this is better evaluated on CT 09/18/2024.    The patient was placed in observation to the Hospital Medicine Service for further evaluation and treatment.

## 2024-09-26 VITALS
BODY MASS INDEX: 24.91 KG/M2 | HEIGHT: 70 IN | TEMPERATURE: 98 F | OXYGEN SATURATION: 95 % | DIASTOLIC BLOOD PRESSURE: 82 MMHG | HEART RATE: 65 BPM | WEIGHT: 174 LBS | SYSTOLIC BLOOD PRESSURE: 157 MMHG | RESPIRATION RATE: 17 BRPM

## 2024-09-26 DIAGNOSIS — U07.1 COVID-19 VIRUS DETECTED: ICD-10-CM

## 2024-09-26 LAB
POCT GLUCOSE: 200 MG/DL (ref 70–110)
POCT GLUCOSE: 235 MG/DL (ref 70–110)

## 2024-09-26 PROCEDURE — 99900035 HC TECH TIME PER 15 MIN (STAT)

## 2024-09-26 PROCEDURE — 25000003 PHARM REV CODE 250: Performed by: NURSE PRACTITIONER

## 2024-09-26 PROCEDURE — 63600175 PHARM REV CODE 636 W HCPCS: Performed by: NURSE PRACTITIONER

## 2024-09-26 PROCEDURE — 27000221 HC OXYGEN, UP TO 24 HOURS

## 2024-09-26 PROCEDURE — 94799 UNLISTED PULMONARY SVC/PX: CPT

## 2024-09-26 PROCEDURE — 94640 AIRWAY INHALATION TREATMENT: CPT

## 2024-09-26 PROCEDURE — 94761 N-INVAS EAR/PLS OXIMETRY MLT: CPT

## 2024-09-26 RX ORDER — QUETIAPINE FUMARATE 50 MG/1
50 TABLET, FILM COATED ORAL NIGHTLY
Qty: 30 TABLET | Refills: 0 | Status: SHIPPED | OUTPATIENT
Start: 2024-09-26 | End: 2024-10-26

## 2024-09-26 RX ORDER — IPRATROPIUM BROMIDE AND ALBUTEROL SULFATE 2.5; .5 MG/3ML; MG/3ML
3 SOLUTION RESPIRATORY (INHALATION) EVERY 6 HOURS PRN
Qty: 90 ML | Refills: 0 | Status: SHIPPED | OUTPATIENT
Start: 2024-09-26 | End: 2025-09-26

## 2024-09-26 RX ORDER — BENZONATATE 100 MG/1
100 CAPSULE ORAL 3 TIMES DAILY PRN
Qty: 30 CAPSULE | Refills: 0 | Status: SHIPPED | OUTPATIENT
Start: 2024-09-26 | End: 2024-10-06

## 2024-09-26 RX ORDER — QUETIAPINE FUMARATE 50 MG/1
50 TABLET, FILM COATED ORAL NIGHTLY
Qty: 30 TABLET | Refills: 0 | Status: SHIPPED | OUTPATIENT
Start: 2024-09-26 | End: 2024-09-26

## 2024-09-26 RX ADMIN — REMDESIVIR 100 MG: 100 INJECTION, POWDER, LYOPHILIZED, FOR SOLUTION INTRAVENOUS at 09:09

## 2024-09-26 RX ADMIN — INSULIN ASPART 5 UNITS: 100 INJECTION, SOLUTION INTRAVENOUS; SUBCUTANEOUS at 09:09

## 2024-09-26 RX ADMIN — ALBUTEROL SULFATE 2 PUFF: 90 AEROSOL, METERED RESPIRATORY (INHALATION) at 11:09

## 2024-09-26 RX ADMIN — DEXAMETHASONE 6 MG: 4 TABLET ORAL at 08:09

## 2024-09-26 RX ADMIN — OXYCODONE HYDROCHLORIDE AND ACETAMINOPHEN 500 MG: 500 TABLET ORAL at 08:09

## 2024-09-26 RX ADMIN — ALBUTEROL SULFATE 2 PUFF: 90 AEROSOL, METERED RESPIRATORY (INHALATION) at 08:09

## 2024-09-26 RX ADMIN — FLUTICASONE FUROATE AND VILANTEROL TRIFENATATE 1 PUFF: 200; 25 POWDER RESPIRATORY (INHALATION) at 08:09

## 2024-09-26 RX ADMIN — INSULIN ASPART 5 UNITS: 100 INJECTION, SOLUTION INTRAVENOUS; SUBCUTANEOUS at 01:09

## 2024-09-26 RX ADMIN — TAMSULOSIN HYDROCHLORIDE 0.4 MG: 0.4 CAPSULE ORAL at 08:09

## 2024-09-26 RX ADMIN — THERA TABS 1 TABLET: TAB at 08:09

## 2024-09-26 RX ADMIN — ATORVASTATIN CALCIUM 20 MG: 20 TABLET, FILM COATED ORAL at 08:09

## 2024-09-26 RX ADMIN — INSULIN ASPART 2 UNITS: 100 INJECTION, SOLUTION INTRAVENOUS; SUBCUTANEOUS at 09:09

## 2024-09-26 RX ADMIN — BICALUTAMIDE 50 MG: 50 TABLET, FILM COATED ORAL at 08:09

## 2024-09-26 NOTE — DISCHARGE INSTRUCTIONS
Our goal at [facility] is to always give you outstanding care and exceptional service. You may receive a survey by mail, text or e-mail in the next 7-10 days from Linwood Chambers and our leadership team asking about the care you received with us. The survey should only take 5-10 minutes to complete and is very important to us.     Your feedback provides us with a way to recognize our staff who work tirelessly to provide the best care! Also, your responses help us learn how to improve when your experience was below our aspiration of excellence. We WILL use your feedback to continue making improvements to help us provide the highest quality care. We keep your personal information and feedback confidential. We appreciate your time completing this survey and can't wait to hear from you!!!     We want you to leave us today feeling like you can DEFINITELY recommend us to others! We look forward to your continued care with us! Thanks so much for choosing [facility] for your healthcare needs!

## 2024-09-26 NOTE — PLAN OF CARE
LMSW met with patient via bedside. Patient will dc with a nebulizer which was delivered to his bedside. Sw scheduled a hospital follow up appointment. Patient's preferred pharmacy is bedside. Patient's family will provide transportation home up discharge.     [facility] - Med Surg ([place])  Discharge Final Note    Primary Care Provider: Matthew Heath III, MD    Expected Discharge Date: 9/26/2024    Final Discharge Note (most recent)       Final Note - 09/26/24 1227          Final Note    Assessment Type Final Discharge Note     Anticipated Discharge Disposition Home or Self Care     Hospital Resources/Appts/Education Provided Provided patient/caregiver with written discharge plan information;Appointments scheduled and added to AVS        Post-Acute Status    Post-Acute Authorization Other     Other Status No Post-Acute Service Needs     Discharge Delays None known at this time (P)                      Important Message from Medicare  Important Message from Medicare regarding Discharge Appeal Rights:  (Patient unable to sign due to Medical Condition)          Contact Info       Matthew Heath III, MD   Specialty: Internal Medicine   Relationship: PCP - General    Deanna Milligan chas  [address] [address]   Phone: [phone]       Next Steps: Follow up on 10/1/2024    Instructions: Hospital follow up at 2:30 PM.    [facility]   Specialty: DME Provider, Orthotics    [address] [place] [address] A  [facility] [address]   Phone: [phone]       Next Steps: Follow up    Instructions: Contact if you have any questions regarding your medical equipment.

## 2024-09-26 NOTE — NURSING
Patient's IV, tele removed and vital signs wnl.  AVS given and patient awaiting ride at this time.  Will continue to monitor.

## 2024-09-26 NOTE — PLAN OF CARE
[facility] delivered Nebulizer to bedside. Patient was given Education sheets and signed delivery ticket.

## 2024-09-28 NOTE — DISCHARGE SUMMARY
[facility] Medicine  Discharge Summary      Patient Name: Lan Robles  MRN: [id number]  FRANCINE: [id number]  Patient Class: IP- Inpatient  Admission Date: 9/24/2024  Hospital Length of Stay: 1 days  Discharge Date and Time: 9/26/2024  5:28 PM  Attending Physician: No att. providers found   Discharging Provider: Liberty Gama PA-C  Primary Care Provider: Matthew Heath III, MD    Primary Care Team: Networked reference to record PCT     HPI:   Mr. Rboles is a 69 YOM with PMHx of DM type II, COPD, chronic respiratory failure on home oxygen, former smoker, RLL lung adenocarcinoma, prostate cancer s/p radiation on casodex, and renal mass under surveillance.     Per Oncology notes:  Stage IA3 (cT1c cN0 M0) RLL NSCLC (adeno) diagnosed on robotic bronch w/ EBUS 8/12/22. Biopsy of a RUL nodule, stations 7 and 11L/R nodes were negative for malignancy. PET/CT 9/6/22 demonstrated mild uptake in RLL nodule only. He completed definitive SBRT 55 Gy in 5 fx on 9/27/22.  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 Bro. Biopsy of an FDG avid RLL nodule was negative for malignancy; however, a 2.0 cm GGO in the RUL revealed lepidic adenocarcinoma. He completed definitive SBRT 50 Gy in 4 fx on 3/4/24.  History significant for high risk prostate cancer s/p EBRT 70 Gy in 28 fx +ADT with Dr. Villalobos completed 11/2020.      He presents to ED via EMS from PCP office due to being found hypoxemic in clinic today.  EMS reports patient had saturations mid 80s on room air with improvement after DuoNebs and CPAP placement; of note he did not have portable home oxygen with him at clinic visit.  Mr. Robles notes shortness of breath for the last several days with malaise, fatigue, chills, subjective fever, chest congestion, productive cough with viscous yellow phlegm, decreased appetite, and increased thirst. He denies CP, abdominal  pain, N/V/D, constipation, urinary symptoms or hematuria, decreased UOP, light headiness, or headaches. He has been noncompliant with home medications, including DM meds, since he began feeling unwell. He lives alone, family is nearby to assist if needed; is independent in ADLs, uses no ambulatory aids.    In the ED he is hypertensive, heart rate stable, saturations 75% on room air with improvement after BiPAP and was ultimately weaned to nasal cannula, and afebrile.  CBC with WBC 6, H/H 11.4/38, platelets 241.  Chemistry was , K 4.1, chloride 107, CO2 20, BUN 21, SCr 1.4, glucose 274.  Magnesium 1.7.  LFTs unremarkable.  BNP 38.  Troponin 0.043 >> 0.095.  Procalcitonin 1.04.  Lactic acid 1.61 >> 2.8.  , , ESR > 120.  COVID positive, flu negative.  CXR with 1. Pulmonary findings suggest edema noting the process is more focal projected over the right lower lung zone and right upper lung zone, developing consolidation not excluded.  Clinical correlation is advised. 2. Patient has known right pulmonary malignancy, this is better evaluated on CT 09/18/2024.    The patient was placed in observation to the Hospital Medicine Service for further evaluation and treatment.     * No surgery found *      Hospital Course:   Lan Robles was admitted for COVID. Started on COVID treatment protocol. Improved, weaned back to his home oxygen requirements. Stable for discharge with PCP follow up. Return precautions discussed, no further questions at discharge      Goals of Care Treatment Preferences:  Code Status: Full Code         Consults:     ID  * COVID-19 virus infection  Primary malignant neoplasm of right upper lobe of lung   Centrilobular emphysema   Chronic hypoxic respiratory failure on home oxygen therapy  Former smoker  Elevated troponin  -admitted due to symptomatic covid infection in setting of known RLL malignant neoplasm, COPD, and chronic respiratory failure on home oxygen  -at admission  hypertensive, heart rate stable, afebrile, saturations 75% on room air with improvement after BiPAP which was ultimately weaned to nasal cannula  -ED workup detailed above  -high risk for severe complications of COVID 19 based on COVID risk score of 5   -troponin elevated at admission, no priors for comparison >> suspect related to demand in setting of acute illness and HTN   -troponin 0.043 >> 0.095, 0.055  -EKG PRN concerns  -initiate standard COVID protocol with isolation- respiratory, contact and droplet per protocol  -initiate targeted therapy with remdesivir, dexamethasone, multivitamin, and vitamin-C    -dose/medication adjustment as appropriate>> change from therapeutic lovenox to prophylactic given significant bleeding when IV became dislodged and patient is ambulatory  -continue home inhaler per pharmacy formulary alternative  -albuterol inhaler scheduled   -Juana PRN   -hydration with IV fluids   -pulmonary toileting with incentive spirometer   -continue oxygen supplementation  - weaned back to home 2L NC prior to discharge, symptoms improved; stable for discharge with PCP follow up, return precautions discussed, no further questions at discharge    Endocrine  Diabetes mellitus, type 2  -chronic   -hemoglobin A1c 8.9   -hold home metformin, Tresiba, Trulicity   -begin dose reduced basal insulin with low-dose SSI  -dose/medication adjustment as appropriate >> increasing insulin due to steroid induced hyperglycemia  -monitor accuchecks AC/HS and PRN hypoglycemic protocol       Final Active Diagnoses:    Diagnosis Date Noted POA    PRINCIPAL PROBLEM:  COVID-19 virus infection [U07.1] 09/24/2024 Yes    Chronic hypoxic respiratory failure, on home oxygen therapy [J96.11, Z99.81] 09/24/2024 Not Applicable    Diabetes mellitus, type 2 [E11.9] 09/24/2024 Yes    Renal mass [N28.89] 09/24/2024 Yes    Primary malignant neoplasm of right upper lobe of lung [C34.11] 02/15/2024 Yes    Centrilobular emphysema [J43.2]  "05/17/2022 Yes    Prostate cancer [C61]  Yes      Problems Resolved During this Admission:       Discharged Condition: stable    Disposition: Home or Self Care    Follow Up:   Follow-up Information       Matthew Heath III, MD Follow up on 10/1/2024.    Specialty: Internal Medicine  Why: Hospital follow up at 2:30 PM.  Contact information:  [address]  [address]  [facility] [address] [phone]               Dme, [facility].    Specialties: DME Provider, Orthotics  Why: Contact if you have any questions regarding your medical equipment.  Contact information:  [address]  [address]  [facility] [address]  [phone]                           Patient Instructions:      NEBULIZER FOR HOME USE     Order Specific Question Answer Comments   Height: 5' 9.5" (1.765 m)    Weight: 78.9 kg (174 lb 0.3 oz)    Does patient have medical equipment at home? oxygen    Length of need (1-99 months): 99      Notify your health care provider if you experience any of the following:  temperature >100.4     Notify your health care provider if you experience any of the following:  severe uncontrolled pain     Notify your health care provider if you experience any of the following:  redness, tenderness, or signs of infection (pain, swelling, redness, odor or green/yellow discharge around incision site)     Notify your health care provider if you experience any of the following:  difficulty breathing or increased cough     Notify your health care provider if you experience any of the following:  worsening rash     Notify your health care provider if you experience any of the following:  persistent dizziness, light-headedness, or visual disturbances     Notify your health care provider if you experience any of the following:  increased confusion or weakness     Activity as tolerated       Significant Diagnostic Studies: Radiology: X-Ray: CXR: 1. Pulmonary findings suggest edema noting the process is more focal " "projected over the right lower lung zone and right upper lung zone, developing consolidation not excluded.  Clinical correlation is advised.  2. Patient has known right pulmonary malignancy, this is better evaluated on CT 09/18/2024.  Pending Diagnostic Studies:       None           Medications:  Reconciled Home Medications:      Medication List        START taking these medications      albuterol-ipratropium 2.5 mg-0.5 mg/3 mL nebulizer solution  Commonly known as: DUO-NEB  Take 3 mLs by nebulization every 6 (six) hours as needed for Wheezing. Rescue     benzonatate 100 MG capsule  Commonly known as: TESSALON  Take 1 capsule (100 mg total) by mouth 3 (three) times daily as needed for Cough.     pulse oximeter device  Commonly known as: pulse oximeter  by Apply Externally route 2 (two) times a day. Use twice daily at 8 AM and 3 PM and record the value in Hypertension Diagnostics[facility] as directed.            CHANGE how you take these medications      QUEtiapine 50 MG tablet  Commonly known as: SEROQUEL  Take 1 tablet (50 mg total) by mouth every evening.  What changed:   medication strength  how much to take            CONTINUE taking these medications      * albuterol 90 mcg/actuation inhaler  Commonly known as: PROVENTIL/VENTOLIN HFA  Inhale 2 puffs into the lungs every 6 (six) hours as needed.     * albuterol 90 mcg/actuation inhaler  Commonly known as: PROVENTIL/VENTOLIN HFA  1 puff as needed     ANORO ELLIPTA 62.5-25 mcg/actuation Dsdv  Generic drug: umeclidinium-vilanteroL  Inhale 1 puff into the lungs once daily. Controller     BD BRENNA 2ND GEN PEN NEEDLE 32 gauge x 5/32" Ndle  Generic drug: pen needle, diabetic  USE TO INJECT INSULIN UNDER THE SKIN EVERY DAY     bicalutamide 50 MG Tab  Commonly known as: CASODEX  Take 1 tablet (50 mg total) by mouth once daily.     cholecalciferol (vitamin D3) 1,250 mcg (50,000 unit) capsule  TAKE 1 CAPSULE BY MOUTH 1 TIME EVERY WEEK     fluticasone propionate 50 mcg/actuation nasal spray  Commonly " known as: FLONASE     hydrOXYzine HCL 25 MG tablet  Commonly known as: ATARAX  TAKE 1 TABLET BY MOUTH EVERY DAY AT BEDTIME AS NEEDED Oral for 30 Days     levocetirizine 5 MG tablet  Commonly known as: XYZAL  Take 1 tablet (5 mg total) by mouth every evening.     metFORMIN 1000 MG tablet  Commonly known as: GLUCOPHAGE     prednisoLONE acetate 1 % Drps  Commonly known as: PRED FORTE  SHAKE LIQUID AND INSTILL 1 DROP IN LEFT EYE FOUR TIMES DAILY AFTER SURGERY     simvastatin 40 MG tablet  Commonly known as: ZOCOR  Take 1 tablet by mouth.     tamsulosin 0.4 mg Cap  Commonly known as: FLOMAX  Take 1 capsule by mouth.     TRESIBA FLEXTOUCH U-100 100 unit/mL (3 mL) insulin pen  Generic drug: insulin degludec  INJECT 20 UNITS UNDER THE SKIN ONCE DAILY     TRULICITY SUBQ  Inject into the skin.           * This list has 2 medication(s) that are the same as other medications prescribed for you. Read the directions carefully, and ask your doctor or other care provider to review them with you.                ASK your doctor about these medications      ferrous sulfate 325 mg (65 mg iron) Tab tablet  Commonly known as: IRON (FERROUS SULFATE)  Take 1 tablet (325 mg total) by mouth every other day.              Indwelling Lines/Drains at time of discharge:   Lines/Drains/Airways       None                   Time spent on the discharge of patient: >35 minutes         Liberty Gama PA-C  Department of Hospital Medicine  [facility] ([place])

## 2024-10-08 ENCOUNTER — OFFICE VISIT (OUTPATIENT)
Dept: ENDOCRINOLOGY | Facility: CLINIC | Age: 69
End: 2024-10-08
Payer: MEDICARE

## 2024-10-08 VITALS — WEIGHT: 176.69 LBS | HEIGHT: 69 IN | BODY MASS INDEX: 26.17 KG/M2

## 2024-10-08 DIAGNOSIS — Z79.4 TYPE 2 DIABETES MELLITUS WITH RETINOPATHY, WITH LONG-TERM CURRENT USE OF INSULIN, MACULAR EDEMA PRESENCE UNSPECIFIED, UNSPECIFIED LATERALITY, UNSPECIFIED RETINOPATHY SEVERITY: ICD-10-CM

## 2024-10-08 DIAGNOSIS — E11.319 TYPE 2 DIABETES MELLITUS WITH RETINOPATHY, WITH LONG-TERM CURRENT USE OF INSULIN, MACULAR EDEMA PRESENCE UNSPECIFIED, UNSPECIFIED LATERALITY, UNSPECIFIED RETINOPATHY SEVERITY: ICD-10-CM

## 2024-10-08 DIAGNOSIS — E04.1 THYROID NODULE: Primary | ICD-10-CM

## 2024-10-08 PROCEDURE — 3288F FALL RISK ASSESSMENT DOCD: CPT | Mod: CPTII,S$GLB,, | Performed by: INTERNAL MEDICINE

## 2024-10-08 PROCEDURE — 1111F DSCHRG MED/CURRENT MED MERGE: CPT | Mod: CPTII,S$GLB,, | Performed by: INTERNAL MEDICINE

## 2024-10-08 PROCEDURE — 1126F AMNT PAIN NOTED NONE PRSNT: CPT | Mod: CPTII,S$GLB,, | Performed by: INTERNAL MEDICINE

## 2024-10-08 PROCEDURE — 1160F RVW MEDS BY RX/DR IN RCRD: CPT | Mod: CPTII,S$GLB,, | Performed by: INTERNAL MEDICINE

## 2024-10-08 PROCEDURE — 3052F HG A1C>EQUAL 8.0%<EQUAL 9.0%: CPT | Mod: CPTII,S$GLB,, | Performed by: INTERNAL MEDICINE

## 2024-10-08 PROCEDURE — 1101F PT FALLS ASSESS-DOCD LE1/YR: CPT | Mod: CPTII,S$GLB,, | Performed by: INTERNAL MEDICINE

## 2024-10-08 PROCEDURE — 1159F MED LIST DOCD IN RCRD: CPT | Mod: CPTII,S$GLB,, | Performed by: INTERNAL MEDICINE

## 2024-10-08 PROCEDURE — 99204 OFFICE O/P NEW MOD 45 MIN: CPT | Mod: S$GLB,,, | Performed by: INTERNAL MEDICINE

## 2024-10-08 PROCEDURE — 3008F BODY MASS INDEX DOCD: CPT | Mod: CPTII,S$GLB,, | Performed by: INTERNAL MEDICINE

## 2024-10-08 PROCEDURE — 99999 PR PBB SHADOW E&M-EST. PATIENT-LVL IV: CPT | Mod: PBBFAC,,, | Performed by: INTERNAL MEDICINE

## 2024-10-08 NOTE — PROGRESS NOTES
"Subjective:      Patient ID: Lan Robles is a 69 y.o. male.    Chief Complaint:  Thyroid Nodule      History of Present Illness  Mr. Robles presents for evaluation and management of thyroid nodules.     Has active history of DMII, HLP, thyroid nodules, prostate cancer and Stage IA3 (cT1c cN0 M0) RLL NSCLC (adeno) diagnosed on robotic bronch w/ EBUS 8/12/22.    First noted to have thyroid nodules on ultrasound in 7/2024.    Denies family history of thyroid cancer.  No personal history of head or neck irradiation. Had radiation for prostate and lung cancer.     Denies dysphagia, hoarseness or orthopnea.    Previous thyroid FNA results:  None    Most recent thyroid USS dated 7/2024:  Right lobe measures 3.7 x 1.4 x 1.5 cm, left lobe measures 5.0 x 1.8 x 2.0 cm.     There is a right nodule measuring 0.9 cm.     There is a left nodule measuring 2.7 x 1.5 x 1.5 cm.     No lymphadenopathy is seen.     Impression:     Dominant left nodule meets criteria for FNA.    Review of Systems   Constitutional:  Negative for chills and fever.   Gastrointestinal:  Negative for nausea and vomiting.       Objective:   Physical Exam  Vitals and nursing note reviewed.       BP Readings from Last 3 Encounters:   09/26/24 (!) 157/82   09/18/24 (!) 134/90   06/12/24 (!) 140/82     Wt Readings from Last 1 Encounters:   10/08/24 0812 80.2 kg (176 lb 11.2 oz)       Body mass index is 26.09 kg/m².    Lab Review:   Lab Results   Component Value Date    HGBA1C 8.9 (H) 09/25/2024     No results found for: "CHOL", "HDL", "LDLCALC", "TRIG", "CHOLHDL"  Lab Results   Component Value Date     09/25/2024    K 4.9 09/25/2024     09/25/2024    CO2 25 09/25/2024     (H) 09/25/2024    BUN 33 (H) 09/25/2024    CREATININE 1.4 09/25/2024    CALCIUM 9.3 09/25/2024    PROT 7.2 09/25/2024    ALBUMIN 2.5 (L) 09/25/2024    BILITOT 0.2 09/25/2024    ALKPHOS 81 09/25/2024    AST 19 09/25/2024    ALT 20 09/25/2024    ANIONGAP 10 09/25/2024 "    ESTGFRAFRICA >60 04/29/2022    EGFRNONAA >60 04/29/2022         Assessment and Plan     Thyroid nodule  --Patient found to have thyroid nodules on exam which was the confirmed by ultrasound in 7/2024  --No risk factors for thyroid cancer  --No compressive symptoms  --Independently reviewed ultrasound images with the patient  --Has left lobe nodule that meets FNA criteria  --Discussed FNA procedure in detail with the patient and possible cytology outcomes including those that may necessitate repeat FNA (I.e. FLUS, non-diagnostic)  --Discussed indications for surgery  --Will proceed with FNA of left lobe nodule    Type 2 diabetes mellitus with retinopathy, with long-term current use of insulin, macular edema presence unspecified, unspecified laterality, unspecified retinopathy severity  --Has type 2 diabetes  --On metformin, Tresiba and Trulicity  --Managed by PCP      FNA of left lobe nodule on 10/10/24 at 2:45 PM      Collins Stafford M.D. Staff Endocrinology

## 2024-10-08 NOTE — ASSESSMENT & PLAN NOTE
--Patient found to have thyroid nodules on exam which was the confirmed by ultrasound in 7/2024  --No risk factors for thyroid cancer  --No compressive symptoms  --Independently reviewed ultrasound images with the patient  --Has left lobe nodule that meets FNA criteria  --Discussed FNA procedure in detail with the patient and possible cytology outcomes including those that may necessitate repeat FNA (I.e. FLUS, non-diagnostic)  --Discussed indications for surgery  --Will proceed with FNA of left lobe nodule

## 2024-10-10 ENCOUNTER — HOSPITAL ENCOUNTER (OUTPATIENT)
Dept: ENDOCRINOLOGY | Facility: CLINIC | Age: 69
Discharge: HOME | End: 2024-10-10
Attending: INTERNAL MEDICINE
Payer: MEDICARE

## 2024-10-10 DIAGNOSIS — E04.1 THYROID NODULE: ICD-10-CM

## 2024-10-10 PROCEDURE — 88173 CYTOPATH EVAL FNA REPORT: CPT | Performed by: PATHOLOGY

## 2024-10-10 PROCEDURE — 10005 FNA BX W/US GDN 1ST LES: CPT | Mod: S$GLB,,, | Performed by: INTERNAL MEDICINE

## 2024-10-12 LAB
FINAL PATHOLOGIC DIAGNOSIS: NORMAL
Lab: NORMAL

## 2024-10-15 ENCOUNTER — PATIENT MESSAGE (OUTPATIENT)
Dept: ENDOCRINOLOGY | Facility: CLINIC | Age: 69
End: 2024-10-15
Payer: MEDICARE

## 2024-10-15 DIAGNOSIS — E04.1 THYROID NODULE: Primary | ICD-10-CM

## 2024-10-16 ENCOUNTER — PATIENT MESSAGE (OUTPATIENT)
Dept: RESEARCH | Facility: HOSPITAL | Age: 69
End: 2024-10-16
Payer: MEDICARE

## 2024-10-17 ENCOUNTER — PATIENT MESSAGE (OUTPATIENT)
Dept: RESEARCH | Facility: HOSPITAL | Age: 69
End: 2024-10-17
Payer: MEDICARE

## 2024-10-28 RX ORDER — IPRATROPIUM BROMIDE AND ALBUTEROL SULFATE 2.5; .5 MG/3ML; MG/3ML
3 SOLUTION RESPIRATORY (INHALATION) EVERY 6 HOURS PRN
Qty: 90 ML | Refills: 0 | OUTPATIENT
Start: 2024-10-28 | End: 2025-10-28

## 2024-10-28 RX ORDER — QUETIAPINE FUMARATE 50 MG/1
50 TABLET, FILM COATED ORAL NIGHTLY
Qty: 30 TABLET | Refills: 0 | OUTPATIENT
Start: 2024-10-28 | End: 2024-11-27

## 2024-10-28 RX ORDER — BENZONATATE 100 MG/1
100 CAPSULE ORAL 3 TIMES DAILY PRN
Qty: 30 CAPSULE | Refills: 0 | OUTPATIENT
Start: 2024-10-28 | End: 2024-11-07

## 2024-10-30 ENCOUNTER — PATIENT MESSAGE (OUTPATIENT)
Dept: RESEARCH | Facility: HOSPITAL | Age: 69
End: 2024-10-30
Payer: MEDICARE

## 2024-10-30 NOTE — PROGRESS NOTES
Subjective:      Lan Robles is a 68 y.o. male who returns today regarding his     He is received XRT for lung ca    On home O2    .    The following portions of the patient's history were reviewed and updated as appropriate: allergies, current medications, past family history, past medical history, past social history, past surgical history and problem list.    Review of Systems  Pertinent items are noted in HPI.  A comprehensive multipoint review of systems was negative except as otherwise stated in the HPI.    Past Medical History:   Diagnosis Date    COPD (chronic obstructive pulmonary disease)     Diabetes mellitus     Prostate cancer      Past Surgical History:   Procedure Laterality Date    ROBOTIC BRONCHOSCOPY N/A 8/12/2022    Procedure: ROBOTIC BRONCHOSCOPY;  Surgeon: Laney Bro MD;  Location: [facility] OR [address] [place] [address];  Service: Pulmonary;  Laterality: N/A;    ROBOTIC BRONCHOSCOPY N/A 2/2/2024    Procedure: ROBOTIC BRONCHOSCOPY;  Surgeon: Laney Bro MD;  Location: [facility] OR [address] [place] [address];  Service: Pulmonary;  Laterality: N/A;     Diabetes Medications               metFORMIN (GLUCOPHAGE) 1000 MG tablet     TRESIBA FLEXTOUCH U-100 100 unit/mL (3 mL) InPn INJECT 20 UNITS UNDER THE SKIN ONCE DAILY          Review of patient's allergies indicates:  No Known Allergies       Objective:   Vitals: There were no vitals taken for this visit.    Physical Exam   General: alert and oriented, no acute distress  Respiratory: Symmetric expansion, non-labored breathing  Cardiovascular: no peripheral edema  Abdomen: soft, non distended  Skin: normal coloration and turgor, no rashes, no suspicious skin lesions noted  Neuro: no gross deficits  Psych: normal judgment and insight, normal mood/affect, and non-anxious    Physical Exam    Lab Review   Urinalysis demonstrates negative for all components  Lab Results   Component Value Date    WBC 5.76 11/17/2023    HGB 13.3 (L) 11/17/2023    HCT 43.3 11/17/2023    MCV 85 11/17/2023      11/17/2023     Lab Results   Component Value Date    CREATININE 1.2 03/07/2024    BUN 15 11/17/2023     Lab Results   Component Value Date    PSADIAG 0.05 03/07/2024    PSADIAG 0.04 12/04/2023    PSADIAG 0.04 09/14/2023    PSATOTAL <0.01 11/07/2022    PSAFREE <0.10 11/07/2022    PSAFREEPCT Unable to calculate 11/07/2022         Imaging  CT ABDOMEN PELVIS W WO CONTRAST     CLINICAL HISTORY:  renal mass;Other specified disorders of kidney and ureter     TECHNIQUE:  Low dose axial images, sagittal and coronal reformations were obtained from the lung bases to the pubic symphysis before and following the IV administration of 100 mL of Omnipaque 350.  No oral contrast was given.     COMPARISON:  02/10/2023.     FINDINGS:  There is a spiculated nodular density within the right lung base medially adjacent to the diaphragm measuring 1.2 x 1.0 cm in size (image 11, series 2) and this is similar to or minimally enlarged when compared to the prior exam.  No new pulmonary nodules are identified.  No pleural effusions are identified.  The bones are intact without evidence for acute fracture or bone destruction.  Degenerative changes of the lumbar spine are evident.     The liver is normal in size and is homogeneous in density with no focal liver lesions identified.  The gallbladder is present and appears unremarkable.  There is no evidence for intrahepatic or extrahepatic biliary dilatation.  The portal venous system is patent.  The spleen, stomach, and pancreas all appear grossly unremarkable.  The adrenal glands are not enlarged.     Once again, there is a soft tissue density mass within the upper pole of the right kidney measuring 2.3 x 1.7 x 2.0 cm (image 38, series 3 and image 79, series 601).  Overall, there has been minimal interval change in the size of this right upper pole solid renal mass when compared to prior studies dating back to 03/24/2020.  There are multiple additional renal hypodensities present  consistent with renal cyst or too small to adequately characterize but also likely representing cysts.  There is no evidence for hydronephrosis.  There are non obstructing renal calculi present bilaterally with the largest calculus within the right kidney within the upper pole measuring 2 mm in size and the largest calculus within the left kidney within the lower pole measuring 7 mm in size.  The abdominal aorta tapers normally without aneurysmal dilatation.  There are multiple para-aortic lymph nodes identified which do not appear enlarged by CT size criteria.  The appendix is present and appears unremarkable.  There are colonic diverticula present without evidence for acute diverticulitis.  The urinary bladder appears unremarkable.  There are prostatic calcifications present and there are multiple metallic densities projecting along the posterior aspect of the prostate gland.  There is no evidence for pelvic or inguinal lymphadenopathy.  No ascites is identified.  There is a small fat containing umbilical hernia.     Impression:     Stable appearance to the solid enhancing mass within the upper pole of the right kidney dating back to 03/24/2020.  Renal neoplasm is suspected.     Bilateral renal cysts.     Spiculated pulmonary nodule within the right lung base adjacent to the hemidiaphragm.  This does appear to have minimally increased in size when compared to prior examinations and is worrisome for malignancy in this patient with history of non-small cell lung cancer.     Nonobstructing renal calculi.     Colonic diverticulosis.     Tiny fat containing umbilical hernia.        Electronically signed by: Richar Macias MD  Date:                                            03/07/2024  Time:                                           12:01  Assessment and Plan:   Prostate cancer   [place] 9 [id number]; psa 123 OSCAR sp ADT*2 years and XRT  See Chey Saini NP 12 months with psa and T     Renal mass  Stable on active  surveillance   We discussed the natural history of small renal masses, the risk of malignancy and disease progression, and the small risk of mortality.  We discussed the risks and benefits of watchful waiting, biopsy, partial and total nephrectomy.  We discussed the benefits of renal preservation when possible.  We discussed percutaneous, laparoscopic and robotic approaches.  We discussed the management of positive surgical margins.  I answered all questions.     yearly CT scan renal protocol      Lung ca  Stage IA3 (cT1c cN0 M0) RLL NSCLC   Per heme onc and rad onc           No

## 2024-11-24 ENCOUNTER — HOSPITAL ENCOUNTER (INPATIENT)
Facility: OTHER | Age: 69
LOS: 3 days | Discharge: HOME | DRG: 190 | End: 2024-11-28
Attending: EMERGENCY MEDICINE | Admitting: HOSPITALIST
Payer: MEDICARE

## 2024-11-24 DIAGNOSIS — E11.65 TYPE 2 DIABETES MELLITUS WITH HYPERGLYCEMIA, WITH LONG-TERM CURRENT USE OF INSULIN: ICD-10-CM

## 2024-11-24 DIAGNOSIS — R06.02 SOB (SHORTNESS OF BREATH): ICD-10-CM

## 2024-11-24 DIAGNOSIS — N17.9 AKI (ACUTE KIDNEY INJURY): ICD-10-CM

## 2024-11-24 DIAGNOSIS — R09.02 HYPOXIA: ICD-10-CM

## 2024-11-24 DIAGNOSIS — Z79.4 TYPE 2 DIABETES MELLITUS WITH HYPERGLYCEMIA, WITH LONG-TERM CURRENT USE OF INSULIN: ICD-10-CM

## 2024-11-24 DIAGNOSIS — J43.2 CENTRILOBULAR EMPHYSEMA: ICD-10-CM

## 2024-11-24 DIAGNOSIS — J44.1 COPD WITH ACUTE EXACERBATION: ICD-10-CM

## 2024-11-24 DIAGNOSIS — J44.1 COPD EXACERBATION: Primary | ICD-10-CM

## 2024-11-24 LAB
ANION GAP SERPL CALC-SCNC: 12 MMOL/L (ref 8–16)
BASOPHILS # BLD AUTO: 0.06 K/UL (ref 0–0.2)
BASOPHILS NFR BLD: 0.9 % (ref 0–1.9)
BNP SERPL-MCNC: <10 PG/ML (ref 0–99)
BUN SERPL-MCNC: 17 MG/DL (ref 8–23)
CALCIUM SERPL-MCNC: 9.8 MG/DL (ref 8.7–10.5)
CHLORIDE SERPL-SCNC: 103 MMOL/L (ref 95–110)
CO2 SERPL-SCNC: 24 MMOL/L (ref 23–29)
CREAT SERPL-MCNC: 1.4 MG/DL (ref 0.5–1.4)
CTP QC/QA: YES
CTP QC/QA: YES
DIFFERENTIAL METHOD BLD: ABNORMAL
EOSINOPHIL # BLD AUTO: 1 K/UL (ref 0–0.5)
EOSINOPHIL NFR BLD: 14.8 % (ref 0–8)
ERYTHROCYTE [DISTWIDTH] IN BLOOD BY AUTOMATED COUNT: 15 % (ref 11.5–14.5)
EST. GFR  (NO RACE VARIABLE): 54 ML/MIN/1.73 M^2
GLUCOSE SERPL-MCNC: 306 MG/DL (ref 70–110)
HCT VFR BLD AUTO: 43.7 % (ref 40–54)
HGB BLD-MCNC: 13.8 G/DL (ref 14–18)
IMM GRANULOCYTES # BLD AUTO: 0.07 K/UL (ref 0–0.04)
IMM GRANULOCYTES NFR BLD AUTO: 1.1 % (ref 0–0.5)
LYMPHOCYTES # BLD AUTO: 1.5 K/UL (ref 1–4.8)
LYMPHOCYTES NFR BLD: 22.8 % (ref 18–48)
MCH RBC QN AUTO: 27.7 PG (ref 27–31)
MCHC RBC AUTO-ENTMCNC: 31.6 G/DL (ref 32–36)
MCV RBC AUTO: 88 FL (ref 82–98)
MONOCYTES # BLD AUTO: 0.7 K/UL (ref 0.3–1)
MONOCYTES NFR BLD: 10.2 % (ref 4–15)
NEUTROPHILS # BLD AUTO: 3.3 K/UL (ref 1.8–7.7)
NEUTROPHILS NFR BLD: 50.2 % (ref 38–73)
NRBC BLD-RTO: 0 /100 WBC
OHS QRS DURATION: 92 MS
OHS QTC CALCULATION: 444 MS
PLATELET # BLD AUTO: 228 K/UL (ref 150–450)
PMV BLD AUTO: 10.7 FL (ref 9.2–12.9)
POC MOLECULAR INFLUENZA A AGN: NEGATIVE
POC MOLECULAR INFLUENZA B AGN: NEGATIVE
POCT GLUCOSE: 379 MG/DL (ref 70–110)
POCT GLUCOSE: 436 MG/DL (ref 70–110)
POCT GLUCOSE: 440 MG/DL (ref 70–110)
POTASSIUM SERPL-SCNC: 4.3 MMOL/L (ref 3.5–5.1)
RBC # BLD AUTO: 4.98 M/UL (ref 4.6–6.2)
SARS-COV-2 RDRP RESP QL NAA+PROBE: NEGATIVE
SODIUM SERPL-SCNC: 139 MMOL/L (ref 136–145)
TROPONIN I SERPL DL<=0.01 NG/ML-MCNC: 0.01 NG/ML (ref 0–0.03)
WBC # BLD AUTO: 6.57 K/UL (ref 3.9–12.7)

## 2024-11-24 PROCEDURE — 25000003 PHARM REV CODE 250

## 2024-11-24 PROCEDURE — 94761 N-INVAS EAR/PLS OXIMETRY MLT: CPT

## 2024-11-24 PROCEDURE — 96361 HYDRATE IV INFUSION ADD-ON: CPT

## 2024-11-24 PROCEDURE — 87635 SARS-COV-2 COVID-19 AMP PRB: CPT | Performed by: EMERGENCY MEDICINE

## 2024-11-24 PROCEDURE — 96375 TX/PRO/DX INJ NEW DRUG ADDON: CPT

## 2024-11-24 PROCEDURE — 99285 EMERGENCY DEPT VISIT HI MDM: CPT | Mod: 25

## 2024-11-24 PROCEDURE — 93010 ELECTROCARDIOGRAM REPORT: CPT | Mod: ,,, | Performed by: INTERNAL MEDICINE

## 2024-11-24 PROCEDURE — 82962 GLUCOSE BLOOD TEST: CPT

## 2024-11-24 PROCEDURE — 96365 THER/PROPH/DIAG IV INF INIT: CPT

## 2024-11-24 PROCEDURE — 84484 ASSAY OF TROPONIN QUANT: CPT | Performed by: EMERGENCY MEDICINE

## 2024-11-24 PROCEDURE — 94640 AIRWAY INHALATION TREATMENT: CPT

## 2024-11-24 PROCEDURE — 94640 AIRWAY INHALATION TREATMENT: CPT | Mod: XB

## 2024-11-24 PROCEDURE — 85025 COMPLETE CBC W/AUTO DIFF WBC: CPT | Performed by: EMERGENCY MEDICINE

## 2024-11-24 PROCEDURE — 99900035 HC TECH TIME PER 15 MIN (STAT)

## 2024-11-24 PROCEDURE — 63600175 PHARM REV CODE 636 W HCPCS

## 2024-11-24 PROCEDURE — 96366 THER/PROPH/DIAG IV INF ADDON: CPT

## 2024-11-24 PROCEDURE — 96372 THER/PROPH/DIAG INJ SC/IM: CPT

## 2024-11-24 PROCEDURE — 25000242 PHARM REV CODE 250 ALT 637 W/ HCPCS: Performed by: EMERGENCY MEDICINE

## 2024-11-24 PROCEDURE — 96376 TX/PRO/DX INJ SAME DRUG ADON: CPT

## 2024-11-24 PROCEDURE — G0378 HOSPITAL OBSERVATION PER HR: HCPCS

## 2024-11-24 PROCEDURE — 80048 BASIC METABOLIC PNL TOTAL CA: CPT | Performed by: EMERGENCY MEDICINE

## 2024-11-24 PROCEDURE — 94644 CONT INHLJ TX 1ST HOUR: CPT

## 2024-11-24 PROCEDURE — 25000242 PHARM REV CODE 250 ALT 637 W/ HCPCS

## 2024-11-24 PROCEDURE — 83880 ASSAY OF NATRIURETIC PEPTIDE: CPT | Performed by: EMERGENCY MEDICINE

## 2024-11-24 PROCEDURE — 93005 ELECTROCARDIOGRAM TRACING: CPT

## 2024-11-24 PROCEDURE — 63600175 PHARM REV CODE 636 W HCPCS: Performed by: EMERGENCY MEDICINE

## 2024-11-24 RX ORDER — IBUPROFEN 200 MG
24 TABLET ORAL
Status: DISCONTINUED | OUTPATIENT
Start: 2024-11-24 | End: 2024-11-28 | Stop reason: HOSPADM

## 2024-11-24 RX ORDER — SODIUM CHLORIDE 0.9 % (FLUSH) 0.9 %
10 SYRINGE (ML) INJECTION
Status: DISCONTINUED | OUTPATIENT
Start: 2024-11-24 | End: 2024-11-28 | Stop reason: HOSPADM

## 2024-11-24 RX ORDER — ALBUTEROL SULFATE 2.5 MG/.5ML
15 SOLUTION RESPIRATORY (INHALATION)
Status: COMPLETED | OUTPATIENT
Start: 2024-11-24 | End: 2024-11-24

## 2024-11-24 RX ORDER — TALC
6 POWDER (GRAM) TOPICAL NIGHTLY PRN
Status: DISCONTINUED | OUTPATIENT
Start: 2024-11-24 | End: 2024-11-28 | Stop reason: HOSPADM

## 2024-11-24 RX ORDER — IPRATROPIUM BROMIDE AND ALBUTEROL SULFATE 2.5; .5 MG/3ML; MG/3ML
3 SOLUTION RESPIRATORY (INHALATION) EVERY 4 HOURS
Status: DISCONTINUED | OUTPATIENT
Start: 2024-11-24 | End: 2024-11-28 | Stop reason: HOSPADM

## 2024-11-24 RX ORDER — METHYLPREDNISOLONE SOD SUCC 125 MG
125 VIAL (EA) INJECTION
Status: COMPLETED | OUTPATIENT
Start: 2024-11-24 | End: 2024-11-24

## 2024-11-24 RX ORDER — IPRATROPIUM BROMIDE AND ALBUTEROL SULFATE 2.5; .5 MG/3ML; MG/3ML
3 SOLUTION RESPIRATORY (INHALATION)
Status: ACTIVE | OUTPATIENT
Start: 2024-11-24 | End: 2024-11-24

## 2024-11-24 RX ORDER — ACETAMINOPHEN 325 MG/1
650 TABLET ORAL EVERY 8 HOURS PRN
Status: DISCONTINUED | OUTPATIENT
Start: 2024-11-24 | End: 2024-11-28 | Stop reason: HOSPADM

## 2024-11-24 RX ORDER — INSULIN ASPART 100 [IU]/ML
0-5 INJECTION, SOLUTION INTRAVENOUS; SUBCUTANEOUS
Status: DISCONTINUED | OUTPATIENT
Start: 2024-11-24 | End: 2024-11-25

## 2024-11-24 RX ORDER — TAMSULOSIN HYDROCHLORIDE 0.4 MG/1
0.4 CAPSULE ORAL
Status: COMPLETED | OUTPATIENT
Start: 2024-11-24 | End: 2024-11-24

## 2024-11-24 RX ORDER — IBUPROFEN 200 MG
16 TABLET ORAL
Status: DISCONTINUED | OUTPATIENT
Start: 2024-11-24 | End: 2024-11-28 | Stop reason: HOSPADM

## 2024-11-24 RX ORDER — QUETIAPINE FUMARATE 50 MG/1
50 TABLET, FILM COATED ORAL NIGHTLY
COMMUNITY
End: 2024-11-25 | Stop reason: SDUPTHER

## 2024-11-24 RX ORDER — LANOLIN ALCOHOL/MO/W.PET/CERES
1 CREAM (GRAM) TOPICAL DAILY
Status: DISCONTINUED | OUTPATIENT
Start: 2024-11-25 | End: 2024-11-28 | Stop reason: HOSPADM

## 2024-11-24 RX ORDER — GLUCAGON 1 MG
1 KIT INJECTION
Status: DISCONTINUED | OUTPATIENT
Start: 2024-11-24 | End: 2024-11-28 | Stop reason: HOSPADM

## 2024-11-24 RX ORDER — ATORVASTATIN CALCIUM 20 MG/1
20 TABLET, FILM COATED ORAL DAILY
Status: DISCONTINUED | OUTPATIENT
Start: 2024-11-24 | End: 2024-11-28 | Stop reason: HOSPADM

## 2024-11-24 RX ORDER — QUETIAPINE FUMARATE 25 MG/1
50 TABLET, FILM COATED ORAL NIGHTLY
Status: DISCONTINUED | OUTPATIENT
Start: 2024-11-24 | End: 2024-11-28 | Stop reason: HOSPADM

## 2024-11-24 RX ORDER — ONDANSETRON HYDROCHLORIDE 2 MG/ML
4 INJECTION, SOLUTION INTRAVENOUS EVERY 8 HOURS PRN
Status: DISCONTINUED | OUTPATIENT
Start: 2024-11-24 | End: 2024-11-28 | Stop reason: HOSPADM

## 2024-11-24 RX ADMIN — INSULIN ASPART 5 UNITS: 100 INJECTION, SOLUTION INTRAVENOUS; SUBCUTANEOUS at 06:11

## 2024-11-24 RX ADMIN — IPRATROPIUM BROMIDE AND ALBUTEROL SULFATE 3 ML: 2.5; .5 SOLUTION RESPIRATORY (INHALATION) at 11:11

## 2024-11-24 RX ADMIN — TAMSULOSIN HYDROCHLORIDE 0.4 MG: 0.4 CAPSULE ORAL at 12:11

## 2024-11-24 RX ADMIN — ALBUTEROL SULFATE 15 MG: 2.5 SOLUTION RESPIRATORY (INHALATION) at 09:11

## 2024-11-24 RX ADMIN — QUETIAPINE FUMARATE 50 MG: 25 TABLET ORAL at 09:11

## 2024-11-24 RX ADMIN — METHYLPREDNISOLONE SODIUM SUCCINATE 125 MG: 125 INJECTION, POWDER, FOR SOLUTION INTRAMUSCULAR; INTRAVENOUS at 09:11

## 2024-11-24 RX ADMIN — ATORVASTATIN CALCIUM 20 MG: 20 TABLET, FILM COATED ORAL at 12:11

## 2024-11-24 RX ADMIN — INSULIN ASPART 5 UNITS: 100 INJECTION, SOLUTION INTRAVENOUS; SUBCUTANEOUS at 12:11

## 2024-11-24 RX ADMIN — IPRATROPIUM BROMIDE AND ALBUTEROL SULFATE 3 ML: 2.5; .5 SOLUTION RESPIRATORY (INHALATION) at 07:11

## 2024-11-24 RX ADMIN — IPRATROPIUM BROMIDE AND ALBUTEROL SULFATE 3 ML: 2.5; .5 SOLUTION RESPIRATORY (INHALATION) at 01:11

## 2024-11-24 RX ADMIN — INSULIN ASPART 3 UNITS: 100 INJECTION, SOLUTION INTRAVENOUS; SUBCUTANEOUS at 09:11

## 2024-11-24 NOTE — ED NOTES
Pt ambulated and maintained a steady gait for ambulatory walk test. Pt wore normal home O2 at 3L, stayed consistently at 80% on 3L w/ no increased work of breathing. Provider aware.

## 2024-11-24 NOTE — ED NOTES
Presents w/ SOB and chest pain for one week, ran out of inhaler one week ago, reports chest pain has been intermittent and worse last night, on 2.5 to 3LNC continuously at home, initial Sa02 78% on 3LNC, placed on NRBM, sats 97%, new orders noted.

## 2024-11-24 NOTE — ED NOTES
Bed: 328  Expected date:   Expected time:   Means of arrival:   Comments:  Hold for raghav in ED 2

## 2024-11-24 NOTE — ED NOTES
To EDOU room 328 without incident, pt ambulating to bathroom w/ portable oxygen, returned to bed without incident, remains on 3LNC

## 2024-11-24 NOTE — ED PROVIDER NOTES
Source of History:  The patient    Chief complaint:  Shortness of Breath (Pt arrived to ED complaining of increased SOB x2 weeks with CP that started last night. Pt speaking in one and two word sentences, noted to be 77% on 3LPM home O2. )      HPI:  Lan Robles is a 69 y.o. male  with a history of COPD, primary malignant neoplasm of the right upper lobe of the lung and former smoker and diabetes presents with complaint of shortness of breath brought in by his daughter.  States shortness of breath has gradually worsened over the last week or so.  Ran out of his breathing treatments which he typically takes twice a day.  He is on 2-1/2 L 247 oxygen at home.  Baseline O2 sats are about 90-92%.  Stated last night he took his own home oxygen and oxygen saturations were 82-83%.  States he feels little congested that started over last couple of days.  Denies any fevers.  He has had intermittent sharp very quick episodic chest pain since yesterday.    This is the extent to the patients complaints today here in the emergency department.    ROS:   See HPI.    Review of patient's allergies indicates:  No Known Allergies    PMH:  As per HPI and below:  Past Medical History:   Diagnosis Date    COPD (chronic obstructive pulmonary disease)     Diabetes mellitus     Prostate cancer      Past Surgical History:   Procedure Laterality Date    ROBOTIC BRONCHOSCOPY N/A 2022    Procedure: ROBOTIC BRONCHOSCOPY;  Surgeon: Laney Bro MD;  Location: [address];  Service: Pulmonary;  Laterality: N/A;    ROBOTIC BRONCHOSCOPY N/A 2024    Procedure: ROBOTIC BRONCHOSCOPY;  Surgeon: Laney Bro MD;  Location: [address];  Service: Pulmonary;  Laterality: N/A;       Social History     Tobacco Use    Smoking status: Former     Current packs/day: 0.00     Types: Cigarettes     Start date:      Quit date: 2022     Years since quittin.6    Smokeless tobacco: Never   Substance Use Topics    Alcohol  "use: Yes    Drug use: Never       Physical Exam:    BP (!) 159/78   Pulse 81   Temp 97.9 °F (36.6 °C) (Oral)   Resp (!) 27   Ht 5' 9" (1.753 m)   Wt 79.8 kg (176 lb)   SpO2 95%   BMI 25.99 kg/m²   Nursing note and vital signs reviewed.  Constitutional: No acute distress.  Nontoxic  Cardiovascular:  Regular rate and rhythm no murmurs gallops or rubs   Chest/ Respiratory:  Accessory muscle use with tachypnea.  Broken sentences due to shortness of breath.  Diffuse inspiratory and expiratory wheezing in all lung fields.  Abdomen: Soft.  Not distended.  Nontender.  No guarding.  No rebound. Non-peritoneal.  Extremities: No pitting edema  Neuro: Alert.   Skin: no rash noted    I decided to obtain the patient's medical records.  Summary of Medical Records:  Reviewed recent discharge summary from September 24th to September 26, 2024.  Brought in for hypoxemia and respiratory failure.  Improved after DuoNebs and CPAP.      Differential Dx/ MDM/ Workup:  69-year-old male presents with respiratory failure in the setting of hypoxemia.  Suspect COPD exacerbation.  Started complaining of some chin congestion last night which could be attributed to possible COVID or pneumonia although he has no fever.  I have also considered but have a lower suspicion for acute coronary syndrome.  This is likely secondary to running out of his home       ED Course as of 11/24/24 1204   Sun Nov 24, 2024   0925 SARS-CoV-2 RNA, Amplification, Qual: Negative [SM]   0925 POC Molecular Influenza A Ag: Negative [SM]   0925 POC Molecular Influenza B Ag: Negative [SM]   0925 Sodium: 139 [SM]   0925 Potassium: 4.3 [SM]   0925 CO2: 24 [SM]   0925 Glucose(!): 306 [SM]   0925 Anion Gap: 12 [SM]   0925 WBC: 6.57 [SM]   0925 Hemoglobin(!): 13.8 [SM]   0925 Platelet Count: 228 [SM]   0925 X-Ray Chest AP Portable  Chest x-ray independently interpreted by myself shows normal cardiac silhouette, increased interstitial disease consistent with chronic " emphysema.  No focal consolidation or effusion noted.  No pneumothorax. [SM]   0996 EKG 12-lead  EKG independently interpreted by myself shows normal sinus rhythm at a rate of 84, normal intervals, narrow QRS, no acute ST T wave abnormalities.  Compared to an EKG September 24, 2024 shows no significant change. [SM]   1013 On re-evaluation patient is breathing much more comfortably.  Will continue breathing treatments and plan for hospitalization. [SM]      ED Course User Index  [SM] Patel Azevedo DO                Attending Attestation:         Attending Critical Care:   Critical Care Times:   ==============================================================  Total Critical Care Time - exclusive of procedural time: 45 minutes.  ==============================================================        Diagnostic Impression:    1. COPD exacerbation    2. SOB (shortness of breath)    3. Hypoxia         ED Disposition Condition    Observation Stable                  Patel Azevedo DO  11/24/24 4317     denies pain/discomfort

## 2024-11-24 NOTE — ED NOTES
Placed on 3LNC, Sa02 93-94%, pt denies c/o SOB at this time. Updated on lab and imaging results, no queries

## 2024-11-24 NOTE — Clinical Note
Diagnosis: COPD with acute exacerbation [603533]   Future Attending Provider: SHELLEY GROVES [[id number]]   Is the patient being sent to ED Observation?: Yes   Special Needs:: No Special Needs [1]

## 2024-11-24 NOTE — H&P
ED Observation Unit  History and Physical      I assumed care of this patient from the Main ED at onset of observation time, 11:45 AM on 11/24/2024.       History of Present Illness:  Lan Robles is a 69 y.o. male  with a history of COPD, primary malignant neoplasm of the right upper lobe of the lung and former smoker and diabetes presents with complaint of shortness of breath brought in by his daughter.  States shortness of breath has gradually worsened over the last week or so.  Ran out of his breathing treatments which he typically takes twice a day.  He is on 2-1/2 L 247 oxygen at home.  Baseline O2 sats are about 90-92%.  Stated last night he took his own home oxygen and oxygen saturations were 82-83%.  States he feels little congested that started over last couple of days.  Denies any fevers.  He has had intermittent sharp very quick episodic chest pain since yesterday.     I reviewed the ED Provider Note dated 11/24/24 prior to my evaluation of this patient.  I reviewed all labs and imaging performed in the Main ED, prior to patient being placed in Observation. Patient was placed in the ED Observation Unit for COPD with acute exacerbation.    ED Course:  On review of labs, rapid COVID and influenza tests are both negative.  On CBC, no leukocytosis.  Hemoglobin of 13.8 and hematocrit of 43.7.  Normal platelet count.  Serum glucose of 306 on BMP.  No electrolyte derangement.  BUN and creatinine within normal limits.  EGFR 54.  Normal anion gap.  BNP is less than 10.  Troponin is negative.  No acute ischemia on EKG.  No acute process on chest x-ray.  Patient was placed in the EDOU for COPD with acute exacerbation and hypoxia.  On my assessment, patient states that he currently feels okay.  Resting comfortably in bed with his daughter next to him.  No complaints at this time.    PMHx   Past Medical History:   Diagnosis Date    COPD (chronic obstructive pulmonary disease)     Diabetes mellitus      Prostate cancer       Past Surgical History:   Procedure Laterality Date    ROBOTIC BRONCHOSCOPY N/A 2022    Procedure: ROBOTIC BRONCHOSCOPY;  Surgeon: Laney Bro MD;  Location: [facility] OR [address] [place] [address];  Service: Pulmonary;  Laterality: N/A;    ROBOTIC BRONCHOSCOPY N/A 2024    Procedure: ROBOTIC BRONCHOSCOPY;  Surgeon: Laney Bro MD;  Location: [facility] OR [address] [place] [address];  Service: Pulmonary;  Laterality: N/A;        Family Hx   Family History   Problem Relation Name Age of Onset    Cancer Father      Diabetes Mother      Melanoma Neg Hx          Social Hx   Social History     Socioeconomic History    Marital status:    Tobacco Use    Smoking status: Former     Current packs/day: 0.00     Types: Cigarettes     Start date:      Quit date: 2022     Years since quittin.6    Smokeless tobacco: Never   Substance and Sexual Activity    Alcohol use: Yes    Drug use: Never    Sexual activity: Yes        Vital Signs   Vitals:    24 0856 24 0906 24 1016 24 1109   BP: (!) 199/110  (!) 159/78    Pulse: 85 80 87 81   Resp: (!) 31 16 (!) 27    Temp:    97.9 °F (36.6 °C)   TempSrc:    Oral   SpO2: 100% 100% 100% 95%   Weight:       Height:            Review of Systems  ROS  As per HPI.    Physical Exam  Physical Exam  Constitutional:       General: He is not in acute distress.     Appearance: He is well-developed. He is not ill-appearing, toxic-appearing or diaphoretic.      Comments: Nasal cannula in place.   HENT:      Head: Normocephalic and atraumatic.      Mouth/Throat:      Mouth: Mucous membranes are moist.      Pharynx: Oropharynx is clear.   Eyes:      Extraocular Movements: Extraocular movements intact.   Cardiovascular:      Rate and Rhythm: Normal rate and regular rhythm.      Pulses: Normal pulses.      Heart sounds: Normal heart sounds.   Pulmonary:      Effort: Tachypnea and accessory muscle usage present. No respiratory distress.      Comments: Diffuse inspiratory and  expiratory wheezes throughout all lung fields.  Speaking in full sentences.  Chest:      Chest wall: No tenderness.   Abdominal:      General: Abdomen is flat. There is no distension.      Hernia: No hernia is present.   Musculoskeletal:         General: Normal range of motion.      Cervical back: Normal range of motion and neck supple.      Right lower leg: No edema.      Left lower leg: No edema.   Skin:     General: Skin is warm and dry.   Neurological:      General: No focal deficit present.      Mental Status: He is alert and oriented to person, place, and time.   Psychiatric:         Mood and Affect: Mood normal. Mood is not anxious.         Behavior: Behavior normal.         Medications:   Scheduled Meds:  Continuous Infusions:  PRN Meds:.  Current Facility-Administered Medications:     acetaminophen, 650 mg, Oral, Q8H PRN    acetaminophen, 650 mg, Oral, Q8H PRN    dextrose 10%, 12.5 g, Intravenous, PRN    dextrose 10%, 25 g, Intravenous, PRN    glucagon (human recombinant), 1 mg, Intramuscular, PRN    glucose, 16 g, Oral, PRN    glucose, 24 g, Oral, PRN    insulin aspart U-100, 0-5 Units, Subcutaneous, QID (AC + HS) PRN    melatonin, 6 mg, Oral, Nightly PRN    ondansetron, 4 mg, Intravenous, Q8H PRN    sodium chloride 0.9%, 10 mL, Intravenous, PRN      Assessment/Plan:  1. COPD exacerbation    2. SOB (shortness of breath)    3. Hypoxia    4. COPD with acute exacerbation      Duonebs q4h, prednisone 60 mg qd starting tomorrow  Continuous oxygen, 2-3 L which he uses daily at home; needs refills of duoneb solution for home use.  See above  See above.    Case was discussed with the ED provider, Dr. Azevedo.

## 2024-11-25 PROBLEM — R09.81 SINUS CONGESTION: Status: ACTIVE | Noted: 2024-11-25

## 2024-11-25 PROBLEM — Z79.4 TYPE 2 DIABETES MELLITUS WITH HYPERGLYCEMIA, WITH LONG-TERM CURRENT USE OF INSULIN: Status: ACTIVE | Noted: 2024-09-24

## 2024-11-25 PROBLEM — E11.65 TYPE 2 DIABETES MELLITUS WITH HYPERGLYCEMIA, WITH LONG-TERM CURRENT USE OF INSULIN: Status: ACTIVE | Noted: 2024-09-24

## 2024-11-25 LAB
ALBUMIN SERPL BCP-MCNC: 3.9 G/DL (ref 3.5–5.2)
ALP SERPL-CCNC: 93 U/L (ref 40–150)
ALT SERPL W/O P-5'-P-CCNC: 24 U/L (ref 10–44)
ANION GAP SERPL CALC-SCNC: 10 MMOL/L (ref 8–16)
ANION GAP SERPL CALC-SCNC: 13 MMOL/L (ref 8–16)
AST SERPL-CCNC: 13 U/L (ref 10–40)
BASOPHILS # BLD AUTO: 0.01 K/UL (ref 0–0.2)
BASOPHILS NFR BLD: 0.1 % (ref 0–1.9)
BILIRUB SERPL-MCNC: 0.2 MG/DL (ref 0.1–1)
BUN SERPL-MCNC: 28 MG/DL (ref 8–23)
BUN SERPL-MCNC: 29 MG/DL (ref 8–23)
CALCIUM SERPL-MCNC: 9.6 MG/DL (ref 8.7–10.5)
CALCIUM SERPL-MCNC: 9.8 MG/DL (ref 8.7–10.5)
CHLORIDE SERPL-SCNC: 101 MMOL/L (ref 95–110)
CHLORIDE SERPL-SCNC: 98 MMOL/L (ref 95–110)
CO2 SERPL-SCNC: 22 MMOL/L (ref 23–29)
CO2 SERPL-SCNC: 25 MMOL/L (ref 23–29)
CREAT SERPL-MCNC: 1.5 MG/DL (ref 0.5–1.4)
CREAT SERPL-MCNC: 1.6 MG/DL (ref 0.5–1.4)
DIFFERENTIAL METHOD BLD: ABNORMAL
EOSINOPHIL # BLD AUTO: 0 K/UL (ref 0–0.5)
EOSINOPHIL NFR BLD: 0 % (ref 0–8)
ERYTHROCYTE [DISTWIDTH] IN BLOOD BY AUTOMATED COUNT: 15.3 % (ref 11.5–14.5)
EST. GFR  (NO RACE VARIABLE): 46 ML/MIN/1.73 M^2
EST. GFR  (NO RACE VARIABLE): 50 ML/MIN/1.73 M^2
GLUCOSE SERPL-MCNC: 465 MG/DL (ref 70–110)
GLUCOSE SERPL-MCNC: 556 MG/DL (ref 70–110)
HCT VFR BLD AUTO: 40 % (ref 40–54)
HGB BLD-MCNC: 12.6 G/DL (ref 14–18)
IMM GRANULOCYTES # BLD AUTO: 0.09 K/UL (ref 0–0.04)
IMM GRANULOCYTES NFR BLD AUTO: 0.9 % (ref 0–0.5)
LYMPHOCYTES # BLD AUTO: 0.8 K/UL (ref 1–4.8)
LYMPHOCYTES NFR BLD: 7.9 % (ref 18–48)
MCH RBC QN AUTO: 27.1 PG (ref 27–31)
MCHC RBC AUTO-ENTMCNC: 31.5 G/DL (ref 32–36)
MCV RBC AUTO: 86 FL (ref 82–98)
MONOCYTES # BLD AUTO: 0.2 K/UL (ref 0.3–1)
MONOCYTES NFR BLD: 2.2 % (ref 4–15)
NEUTROPHILS # BLD AUTO: 8.7 K/UL (ref 1.8–7.7)
NEUTROPHILS NFR BLD: 88.9 % (ref 38–73)
NRBC BLD-RTO: 0 /100 WBC
PLATELET # BLD AUTO: 244 K/UL (ref 150–450)
PMV BLD AUTO: 10.8 FL (ref 9.2–12.9)
POCT GLUCOSE: 443 MG/DL (ref 70–110)
POCT GLUCOSE: 457 MG/DL (ref 70–110)
POCT GLUCOSE: 465 MG/DL (ref 70–110)
POCT GLUCOSE: 489 MG/DL (ref 70–110)
POCT GLUCOSE: >500 MG/DL (ref 70–110)
POTASSIUM SERPL-SCNC: 4.7 MMOL/L (ref 3.5–5.1)
POTASSIUM SERPL-SCNC: 5.1 MMOL/L (ref 3.5–5.1)
PROT SERPL-MCNC: 7.6 G/DL (ref 6–8.4)
RBC # BLD AUTO: 4.65 M/UL (ref 4.6–6.2)
SODIUM SERPL-SCNC: 133 MMOL/L (ref 136–145)
SODIUM SERPL-SCNC: 136 MMOL/L (ref 136–145)
WBC # BLD AUTO: 9.78 K/UL (ref 3.9–12.7)

## 2024-11-25 PROCEDURE — 25000003 PHARM REV CODE 250: Performed by: PHYSICIAN ASSISTANT

## 2024-11-25 PROCEDURE — 25000242 PHARM REV CODE 250 ALT 637 W/ HCPCS

## 2024-11-25 PROCEDURE — 25000003 PHARM REV CODE 250: Performed by: EMERGENCY MEDICINE

## 2024-11-25 PROCEDURE — 94640 AIRWAY INHALATION TREATMENT: CPT

## 2024-11-25 PROCEDURE — 85025 COMPLETE CBC W/AUTO DIFF WBC: CPT

## 2024-11-25 PROCEDURE — 63600175 PHARM REV CODE 636 W HCPCS

## 2024-11-25 PROCEDURE — 99900035 HC TECH TIME PER 15 MIN (STAT)

## 2024-11-25 PROCEDURE — 63600175 PHARM REV CODE 636 W HCPCS: Performed by: HOSPITALIST

## 2024-11-25 PROCEDURE — 25000003 PHARM REV CODE 250: Performed by: NURSE PRACTITIONER

## 2024-11-25 PROCEDURE — 94640 AIRWAY INHALATION TREATMENT: CPT | Mod: XB

## 2024-11-25 PROCEDURE — 27000221 HC OXYGEN, UP TO 24 HOURS

## 2024-11-25 PROCEDURE — 12000002 HC ACUTE/MED SURGE SEMI-PRIVATE ROOM

## 2024-11-25 PROCEDURE — 94761 N-INVAS EAR/PLS OXIMETRY MLT: CPT

## 2024-11-25 PROCEDURE — 25000003 PHARM REV CODE 250

## 2024-11-25 PROCEDURE — 63600175 PHARM REV CODE 636 W HCPCS: Performed by: NURSE PRACTITIONER

## 2024-11-25 PROCEDURE — 82962 GLUCOSE BLOOD TEST: CPT

## 2024-11-25 PROCEDURE — 96372 THER/PROPH/DIAG INJ SC/IM: CPT

## 2024-11-25 PROCEDURE — 25000242 PHARM REV CODE 250 ALT 637 W/ HCPCS: Performed by: HOSPITALIST

## 2024-11-25 PROCEDURE — 80053 COMPREHEN METABOLIC PANEL: CPT

## 2024-11-25 PROCEDURE — 80048 BASIC METABOLIC PNL TOTAL CA: CPT | Mod: XB | Performed by: NURSE PRACTITIONER

## 2024-11-25 RX ORDER — INSULIN ASPART 100 [IU]/ML
10 INJECTION, SOLUTION INTRAVENOUS; SUBCUTANEOUS
Status: DISCONTINUED | OUTPATIENT
Start: 2024-11-25 | End: 2024-11-28 | Stop reason: HOSPADM

## 2024-11-25 RX ORDER — MAGNESIUM SULFATE HEPTAHYDRATE 40 MG/ML
2 INJECTION, SOLUTION INTRAVENOUS
Status: COMPLETED | OUTPATIENT
Start: 2024-11-25 | End: 2024-11-25

## 2024-11-25 RX ORDER — HYDRALAZINE HYDROCHLORIDE 20 MG/ML
10 INJECTION INTRAMUSCULAR; INTRAVENOUS EVERY 6 HOURS PRN
Status: DISCONTINUED | OUTPATIENT
Start: 2024-11-25 | End: 2024-11-28 | Stop reason: HOSPADM

## 2024-11-25 RX ORDER — INSULIN ASPART 100 [IU]/ML
0-10 INJECTION, SOLUTION INTRAVENOUS; SUBCUTANEOUS
Status: DISCONTINUED | OUTPATIENT
Start: 2024-11-25 | End: 2024-11-28 | Stop reason: HOSPADM

## 2024-11-25 RX ORDER — INSULIN GLARGINE 100 [IU]/ML
35 INJECTION, SOLUTION SUBCUTANEOUS DAILY
Status: DISCONTINUED | OUTPATIENT
Start: 2024-11-25 | End: 2024-11-28 | Stop reason: HOSPADM

## 2024-11-25 RX ORDER — GUAIFENESIN 100 MG/5ML
200 SOLUTION ORAL EVERY 4 HOURS PRN
Status: DISCONTINUED | OUTPATIENT
Start: 2024-11-25 | End: 2024-11-27

## 2024-11-25 RX ORDER — INSULIN GLARGINE 100 [IU]/ML
20 INJECTION, SOLUTION SUBCUTANEOUS DAILY
Status: DISCONTINUED | OUTPATIENT
Start: 2024-11-25 | End: 2024-11-25

## 2024-11-25 RX ORDER — INSULIN ASPART 100 [IU]/ML
6 INJECTION, SOLUTION INTRAVENOUS; SUBCUTANEOUS
Status: DISCONTINUED | OUTPATIENT
Start: 2024-11-25 | End: 2024-11-25

## 2024-11-25 RX ORDER — OXYMETAZOLINE HCL 0.05 %
2 SPRAY, NON-AEROSOL (ML) NASAL
Status: COMPLETED | OUTPATIENT
Start: 2024-11-25 | End: 2024-11-25

## 2024-11-25 RX ORDER — INSULIN GLARGINE 100 [IU]/ML
35 INJECTION, SOLUTION SUBCUTANEOUS DAILY
Status: DISCONTINUED | OUTPATIENT
Start: 2024-11-26 | End: 2024-11-25

## 2024-11-25 RX ORDER — FLUTICASONE PROPIONATE 50 MCG
2 SPRAY, SUSPENSION (ML) NASAL DAILY
Status: DISCONTINUED | OUTPATIENT
Start: 2024-11-25 | End: 2024-11-28 | Stop reason: HOSPADM

## 2024-11-25 RX ADMIN — QUETIAPINE FUMARATE 50 MG: 25 TABLET ORAL at 08:11

## 2024-11-25 RX ADMIN — MELATONIN TAB 3 MG 6 MG: 3 TAB at 08:11

## 2024-11-25 RX ADMIN — FERROUS SULFATE TAB 325 MG (65 MG ELEMENTAL FE) 1 EACH: 325 (65 FE) TAB at 09:11

## 2024-11-25 RX ADMIN — MAGNESIUM SULFATE HEPTAHYDRATE 2 G: 40 INJECTION, SOLUTION INTRAVENOUS at 01:11

## 2024-11-25 RX ADMIN — IPRATROPIUM BROMIDE AND ALBUTEROL SULFATE 3 ML: 2.5; .5 SOLUTION RESPIRATORY (INHALATION) at 03:11

## 2024-11-25 RX ADMIN — FLUTICASONE PROPIONATE 100 MCG: 50 SPRAY, METERED NASAL at 04:11

## 2024-11-25 RX ADMIN — OXYMETAZOLINE HYDROCHLORIDE 2 SPRAY: 0.5 SPRAY NASAL at 06:11

## 2024-11-25 RX ADMIN — ATORVASTATIN CALCIUM 20 MG: 20 TABLET, FILM COATED ORAL at 09:11

## 2024-11-25 RX ADMIN — PREDNISONE 60 MG: 50 TABLET ORAL at 09:11

## 2024-11-25 RX ADMIN — INSULIN ASPART 6 UNITS: 100 INJECTION, SOLUTION INTRAVENOUS; SUBCUTANEOUS at 12:11

## 2024-11-25 RX ADMIN — IPRATROPIUM BROMIDE AND ALBUTEROL SULFATE 3 ML: 2.5; .5 SOLUTION RESPIRATORY (INHALATION) at 11:11

## 2024-11-25 RX ADMIN — IPRATROPIUM BROMIDE AND ALBUTEROL SULFATE 3 ML: 2.5; .5 SOLUTION RESPIRATORY (INHALATION) at 07:11

## 2024-11-25 RX ADMIN — INSULIN GLARGINE 35 UNITS: 100 INJECTION, SOLUTION SUBCUTANEOUS at 03:11

## 2024-11-25 RX ADMIN — INSULIN ASPART 6 UNITS: 100 INJECTION, SOLUTION INTRAVENOUS; SUBCUTANEOUS at 07:11

## 2024-11-25 RX ADMIN — INSULIN ASPART 5 UNITS: 100 INJECTION, SOLUTION INTRAVENOUS; SUBCUTANEOUS at 09:11

## 2024-11-25 RX ADMIN — GUAIFENESIN 200 MG: 100 SOLUTION ORAL at 10:11

## 2024-11-25 RX ADMIN — SODIUM CHLORIDE 1000 ML: 9 INJECTION, SOLUTION INTRAVENOUS at 09:11

## 2024-11-25 RX ADMIN — INSULIN ASPART 10 UNITS: 100 INJECTION, SOLUTION INTRAVENOUS; SUBCUTANEOUS at 04:11

## 2024-11-25 RX ADMIN — INSULIN ASPART 5 UNITS: 100 INJECTION, SOLUTION INTRAVENOUS; SUBCUTANEOUS at 06:11

## 2024-11-25 NOTE — PROGRESS NOTES
[facility] ED Observation Unit  Progress Note      HPI   Lan Robles is a 69 y.o. male  with a history of COPD, primary malignant neoplasm of the right upper lobe of the lung and former smoker and diabetes presents with complaint of shortness of breath brought in by his daughter.  States shortness of breath has gradually worsened over the last week or so.  Ran out of his breathing treatments which he typically takes twice a day.  He is on 2-1/2 L 247 oxygen at home.  Baseline O2 sats are about 90-92%.  Stated last night he took his own home oxygen and oxygen saturations were 82-83%.  States he feels little congested that started over last couple of days.  Denies any fevers.  He has had intermittent sharp very quick episodic chest pain since yesterday.      ED Course:  On review of labs, rapid COVID and influenza tests are both negative.  On CBC, no leukocytosis.  Hemoglobin of 13.8 and hematocrit of 43.7.  Normal platelet count.  Serum glucose of 306 on BMP.  No electrolyte derangement.  BUN and creatinine within normal limits.  EGFR 54.  Normal anion gap.  BNP is less than 10.  Troponin is negative.  No acute ischemia on EKG.  No acute process on chest x-ray.  Patient was placed in the EDOU for COPD with acute exacerbation and hypoxia.  On my assessment, patient states that he currently feels okay.  Resting comfortably in bed with his daughter next to him.  No complaints at this time.     Interval History   Patient received q.4 hours DuoNebs throughout his stay in the EDOU, he remained hypoxic this morning in the low 80s with ambulation on 2 L nasal cannula.  Decreased breath sounds with expiratory wheezing noted throughout the lungs.  Labs this a.m. also revealed hyperglycemia and an ADEN.  Patient has not been taking his long-acting insulin for the last 3 days.    PMHx   Past Medical History:   Diagnosis Date    Adenocarcinoma of lung, right 08/2022    COPD (chronic obstructive pulmonary disease)      Diabetes mellitus     Prostate cancer 2020      Past Surgical History:   Procedure Laterality Date    ROBOTIC BRONCHOSCOPY N/A 2022    Procedure: ROBOTIC BRONCHOSCOPY;  Surgeon: Laney Bro MD;  Location: [facility] OR [address] [place] [address];  Service: Pulmonary;  Laterality: N/A;    ROBOTIC BRONCHOSCOPY N/A 2024    Procedure: ROBOTIC BRONCHOSCOPY;  Surgeon: Laney Bro MD;  [facility];  Service: Pulmonary;  Laterality: N/A;        Family Hx   Family History   Problem Relation Name Age of Onset    Cancer Father      Diabetes Mother      Melanoma Neg Hx          Social Hx   Social History     Socioeconomic History    Marital status:    Tobacco Use    Smoking status: Former     Current packs/day: 0.00     Types: Cigarettes     Start date:      Quit date: 2022     Years since quittin.6    Smokeless tobacco: Never   Substance and Sexual Activity    Alcohol use: Yes    Drug use: Never    Sexual activity: Yes        Vital Signs   Vitals:    24 1512 24 1515 24 1516 24 1624   BP:    (!) 190/89   BP Location:    Right arm   Patient Position:    Lying   Pulse:  78  77   Resp:  16  17   Temp:    98.1 °F (36.7 °C)   TempSrc:    Oral   SpO2: 97%  96% (!) 93%   Weight:       Height:            Review of Systems  Review of Systems   Constitutional:  Positive for malaise/fatigue. Negative for chills and fever.   Respiratory:  Positive for cough, shortness of breath and wheezing.    Cardiovascular:  Negative for chest pain.   Gastrointestinal:  Negative for abdominal pain, nausea and vomiting.   Neurological:  Positive for weakness. Negative for dizziness and headaches.   All other systems reviewed and are negative.      Brief Physical Exam/Reassessment   Physical Exam    Nursing note and vitals reviewed.  Constitutional: He appears well-developed and well-nourished. He is not diaphoretic. He does not appear ill. No distress.   Neck: Neck supple.    Full passive range of  motion without pain.     Cardiovascular:  Normal rate, regular rhythm, S1 normal, S2 normal and normal heart sounds.           Pulmonary/Chest: Effort normal. No tachypnea. He has decreased breath sounds in the right upper field, the right middle field, the right lower field, the left upper field, the left middle field and the left lower field. He has wheezes in the right upper field, the right middle field, the right lower field, the left upper field, the left middle field and the left lower field.   Abdominal: Abdomen is soft and flat. There is no abdominal tenderness.   Musculoskeletal:      Cervical back: Full passive range of motion without pain and neck supple.     Neurological: He is alert and oriented to person, place, and time. GCS eye subscore is 4. GCS verbal subscore is 5. GCS motor subscore is 6.   Skin: Skin is warm, dry and intact.         Labs/Imaging   Abnormal Labs Reviewed   CBC W/ AUTO DIFFERENTIAL - Abnormal; Notable for the following components:       Result Value    Hemoglobin 13.8 (*)     MCHC 31.6 (*)     RDW 15.0 (*)     Immature Granulocytes 1.1 (*)     Immature Grans (Abs) 0.07 (*)     Eos # 1.0 (*)     Eosinophil % 14.8 (*)     All other components within normal limits   BASIC METABOLIC PANEL - Abnormal; Notable for the following components:    Glucose 306 (*)     eGFR 54 (*)     All other components within normal limits   CBC W/ AUTO DIFFERENTIAL - Abnormal; Notable for the following components:    Hemoglobin 12.6 (*)     MCHC 31.5 (*)     RDW 15.3 (*)     Immature Granulocytes 0.9 (*)     Gran # (ANC) 8.7 (*)     Immature Grans (Abs) 0.09 (*)     Lymph # 0.8 (*)     Mono # 0.2 (*)     Gran % 88.9 (*)     Lymph % 7.9 (*)     Mono % 2.2 (*)     All other components within normal limits   COMPREHENSIVE METABOLIC PANEL - Abnormal; Notable for the following components:    Glucose 465 (*)     BUN 29 (*)     Creatinine 1.5 (*)     eGFR 50 (*)     All other components within normal limits     Narrative:      GLU  critical result(s) called and verbal readback obtained from   Tali Kumar RN. by TK4 11/25/2024 06:57   BASIC METABOLIC PANEL - Abnormal; Notable for the following components:    Sodium 133 (*)     CO2 22 (*)     Glucose 556 (*)     BUN 28 (*)     Creatinine 1.6 (*)     eGFR 46 (*)     All other components within normal limits    Narrative:     Glucose critical result(s) called and verbal readback obtained from   Krysten Person RN by JCHA 11/25/2024 13:40   POCT GLUCOSE - Abnormal; Notable for the following components:    POCT Glucose 379 (*)     All other components within normal limits   POCT GLUCOSE - Abnormal; Notable for the following components:    POCT Glucose 436 (*)     All other components within normal limits   POCT GLUCOSE - Abnormal; Notable for the following components:    POCT Glucose 440 (*)     All other components within normal limits   POCT GLUCOSE - Abnormal; Notable for the following components:    POCT Glucose 457 (*)     All other components within normal limits   POCT GLUCOSE - Abnormal; Notable for the following components:    POCT Glucose 443 (*)     All other components within normal limits   POCT GLUCOSE - Abnormal; Notable for the following components:    POCT Glucose >500 (*)     All other components within normal limits   POCT GLUCOSE - Abnormal; Notable for the following components:    POCT Glucose 489 (*)     All other components within normal limits       Imaging Results              X-Ray Chest AP Portable (Final result)  Result time 11/24/24 09:35:42      Final result by Parminder Miles MD (11/24/24 09:35:42)                   Impression:      No detrimental change from CT of 09/18/2024      Electronically signed by: Parminder Miles MD  Date:    11/24/2024  Time:    09:35               Narrative:    EXAMINATION:  XR CHEST AP PORTABLE    CLINICAL HISTORY:  Shortness of breath    TECHNIQUE:  Single frontal view of the chest was performed.    COMPARISON:  CT  chest 06/12/2024 and CT chest 09/24/2024    FINDINGS:  Opacity RIGHT upper lobe consistent with CT examination 09/18/2024.  The lungs are otherwise clear with normal appearance of pulmonary vasculature. No pleural effusion. No evident pneumothorax.    The cardiac silhouette is normal in size. The hilar and mediastinal contours are unremarkable.    Bones are intact.                                       I reviewed all labs, imaging, EKGs.     Plan   1. COPD exacerbation     - continue DuoNebs, upgraded to Hospital Medicine   2. SOB (shortness of breath)    3. Hypoxia    4. COPD with acute exacerbation    5. ADEN (acute kidney injury)    - IV fluids   6. Type 2 diabetes mellitus with hyperglycemia, with long-term current use of insulin        I have discussed this case with Darcy Fitch MD.

## 2024-11-25 NOTE — ED NOTES
11/24/24 1929   Safety Interventions   Level of Consciousness (AVPU) alert   Mood/Behavior calm;cooperative;behavior appropriate to situation   Updates Patient is resting comfortably;Bed rails up - Call light within reach;Denies pain   Patient Rounds bed in low position;bed wheels locked;call light in patient/parent reach;clutter free environment maintained;ID band on;visualized patient;placement of personal items at bedside

## 2024-11-25 NOTE — ED NOTES
11/25/24 0659   OTHER   ED Contacted For Critical Value   Critical Value Communication   Date Result Received 11/25/24   Time Result Received 0659   Resulting Department of Critical Value Lab   Critical Test #1 Glucose 465   Name of Notified Physician/Designee Dr. Good   Date Notified 11/25/24   Time Notified 0700

## 2024-11-25 NOTE — ED NOTES
11/25/24 0000   Safety Interventions   Level of Consciousness (AVPU) alert   Mood/Behavior calm;cooperative   Updates Vitals stable;Denies pain;Patient is resting comfortably;Bed rails up - Call light within reach   Patient Rounds bed in low position;bed wheels locked;call light in patient/parent reach;clutter free environment maintained;ID band on;visualized patient;placement of personal items at bedside

## 2024-11-25 NOTE — ED NOTES
11/25/24 0632   Safety Interventions   Level of Consciousness (AVPU) alert   Mood/Behavior calm;cooperative;behavior appropriate to situation   Updates Bed rails up - Call light within reach;Vitals stable;Denies pain;Patient is resting comfortably   Patient Rounds bed in low position;bed wheels locked;call light in patient/parent reach;clutter free environment maintained;ID band on;visualized patient;placement of personal items at bedside

## 2024-11-25 NOTE — ED NOTES
11/25/24 0234   Safety Interventions   Level of Consciousness (AVPU) alert   Mood/Behavior calm;cooperative;behavior appropriate to situation   Updates Patient is resting comfortably;Bed rails up - Call light within reach;Vitals stable;Denies pain   Patient Rounds bed in low position;placement of personal items at bedside;bed wheels locked;call light in patient/parent reach;visualized patient;clutter free environment maintained;ID band on

## 2024-11-26 LAB
ANION GAP SERPL CALC-SCNC: 11 MMOL/L (ref 8–16)
BUN SERPL-MCNC: 30 MG/DL (ref 8–23)
CALCIUM SERPL-MCNC: 9.7 MG/DL (ref 8.7–10.5)
CHLORIDE SERPL-SCNC: 104 MMOL/L (ref 95–110)
CO2 SERPL-SCNC: 25 MMOL/L (ref 23–29)
CREAT SERPL-MCNC: 1.5 MG/DL (ref 0.5–1.4)
EST. GFR  (NO RACE VARIABLE): 50 ML/MIN/1.73 M^2
GLUCOSE SERPL-MCNC: 332 MG/DL (ref 70–110)
MAGNESIUM SERPL-MCNC: 2.2 MG/DL (ref 1.6–2.6)
POCT GLUCOSE: 197 MG/DL (ref 70–110)
POCT GLUCOSE: 269 MG/DL (ref 70–110)
POCT GLUCOSE: 332 MG/DL (ref 70–110)
POCT GLUCOSE: 366 MG/DL (ref 70–110)
POCT GLUCOSE: 399 MG/DL (ref 70–110)
POCT GLUCOSE: 410 MG/DL (ref 70–110)
POTASSIUM SERPL-SCNC: 4 MMOL/L (ref 3.5–5.1)
SODIUM SERPL-SCNC: 140 MMOL/L (ref 136–145)

## 2024-11-26 PROCEDURE — 82962 GLUCOSE BLOOD TEST: CPT

## 2024-11-26 PROCEDURE — 25000003 PHARM REV CODE 250: Performed by: PHYSICIAN ASSISTANT

## 2024-11-26 PROCEDURE — 94640 AIRWAY INHALATION TREATMENT: CPT

## 2024-11-26 PROCEDURE — 25000242 PHARM REV CODE 250 ALT 637 W/ HCPCS: Performed by: HOSPITALIST

## 2024-11-26 PROCEDURE — 87205 SMEAR GRAM STAIN: CPT | Performed by: STUDENT IN AN ORGANIZED HEALTH CARE EDUCATION/TRAINING PROGRAM

## 2024-11-26 PROCEDURE — 99900035 HC TECH TIME PER 15 MIN (STAT)

## 2024-11-26 PROCEDURE — 25000242 PHARM REV CODE 250 ALT 637 W/ HCPCS

## 2024-11-26 PROCEDURE — 80048 BASIC METABOLIC PNL TOTAL CA: CPT | Performed by: HOSPITALIST

## 2024-11-26 PROCEDURE — 94761 N-INVAS EAR/PLS OXIMETRY MLT: CPT

## 2024-11-26 PROCEDURE — 11000001 HC ACUTE MED/SURG PRIVATE ROOM

## 2024-11-26 PROCEDURE — 87798 DETECT AGENT NOS DNA AMP: CPT | Performed by: STUDENT IN AN ORGANIZED HEALTH CARE EDUCATION/TRAINING PROGRAM

## 2024-11-26 PROCEDURE — 25000242 PHARM REV CODE 250 ALT 637 W/ HCPCS: Performed by: STUDENT IN AN ORGANIZED HEALTH CARE EDUCATION/TRAINING PROGRAM

## 2024-11-26 PROCEDURE — 63600175 PHARM REV CODE 636 W HCPCS: Performed by: STUDENT IN AN ORGANIZED HEALTH CARE EDUCATION/TRAINING PROGRAM

## 2024-11-26 PROCEDURE — 36415 COLL VENOUS BLD VENIPUNCTURE: CPT | Performed by: HOSPITALIST

## 2024-11-26 PROCEDURE — 27000646 HC AEROBIKA DEVICE

## 2024-11-26 PROCEDURE — 27000221 HC OXYGEN, UP TO 24 HOURS

## 2024-11-26 PROCEDURE — 25000003 PHARM REV CODE 250

## 2024-11-26 PROCEDURE — 94664 DEMO&/EVAL PT USE INHALER: CPT

## 2024-11-26 PROCEDURE — 87070 CULTURE OTHR SPECIMN AEROBIC: CPT | Performed by: STUDENT IN AN ORGANIZED HEALTH CARE EDUCATION/TRAINING PROGRAM

## 2024-11-26 PROCEDURE — 63600175 PHARM REV CODE 636 W HCPCS: Performed by: HOSPITALIST

## 2024-11-26 PROCEDURE — 99222 1ST HOSP IP/OBS MODERATE 55: CPT | Mod: GC,,, | Performed by: INTERNAL MEDICINE

## 2024-11-26 PROCEDURE — 83735 ASSAY OF MAGNESIUM: CPT | Performed by: HOSPITALIST

## 2024-11-26 RX ORDER — SODIUM CHLORIDE FOR INHALATION 3 %
4 VIAL, NEBULIZER (ML) INHALATION ONCE
Status: COMPLETED | OUTPATIENT
Start: 2024-11-26 | End: 2024-11-26

## 2024-11-26 RX ORDER — FLUTICASONE FUROATE AND VILANTEROL 200; 25 UG/1; UG/1
1 POWDER RESPIRATORY (INHALATION) DAILY
Status: DISCONTINUED | OUTPATIENT
Start: 2024-11-26 | End: 2024-11-26

## 2024-11-26 RX ORDER — PREDNISONE 20 MG/1
40 TABLET ORAL DAILY
Status: DISCONTINUED | OUTPATIENT
Start: 2024-11-26 | End: 2024-11-28 | Stop reason: HOSPADM

## 2024-11-26 RX ORDER — FLUTICASONE FUROATE AND VILANTEROL 200; 25 UG/1; UG/1
1 POWDER RESPIRATORY (INHALATION) DAILY
Status: DISCONTINUED | OUTPATIENT
Start: 2024-11-26 | End: 2024-11-28 | Stop reason: HOSPADM

## 2024-11-26 RX ADMIN — INSULIN ASPART 10 UNITS: 100 INJECTION, SOLUTION INTRAVENOUS; SUBCUTANEOUS at 11:11

## 2024-11-26 RX ADMIN — GUAIFENESIN 200 MG: 100 SOLUTION ORAL at 10:11

## 2024-11-26 RX ADMIN — FLUTICASONE FUROATE AND VILANTEROL TRIFENATATE 1 PUFF: 200; 25 POWDER RESPIRATORY (INHALATION) at 07:11

## 2024-11-26 RX ADMIN — INSULIN ASPART 10 UNITS: 100 INJECTION, SOLUTION INTRAVENOUS; SUBCUTANEOUS at 05:11

## 2024-11-26 RX ADMIN — FERROUS SULFATE TAB 325 MG (65 MG ELEMENTAL FE) 1 EACH: 325 (65 FE) TAB at 10:11

## 2024-11-26 RX ADMIN — INSULIN GLARGINE 35 UNITS: 100 INJECTION, SOLUTION SUBCUTANEOUS at 09:11

## 2024-11-26 RX ADMIN — PREDNISONE 40 MG: 20 TABLET ORAL at 06:11

## 2024-11-26 RX ADMIN — IPRATROPIUM BROMIDE AND ALBUTEROL SULFATE 3 ML: 2.5; .5 SOLUTION RESPIRATORY (INHALATION) at 07:11

## 2024-11-26 RX ADMIN — INSULIN ASPART 10 UNITS: 100 INJECTION, SOLUTION INTRAVENOUS; SUBCUTANEOUS at 08:11

## 2024-11-26 RX ADMIN — FLUTICASONE PROPIONATE 100 MCG: 50 SPRAY, METERED NASAL at 09:11

## 2024-11-26 RX ADMIN — IPRATROPIUM BROMIDE AND ALBUTEROL SULFATE 3 ML: 2.5; .5 SOLUTION RESPIRATORY (INHALATION) at 11:11

## 2024-11-26 RX ADMIN — SODIUM CHLORIDE SOLN NEBU 3% 4 ML: 3 NEBU SOLN at 07:11

## 2024-11-26 RX ADMIN — QUETIAPINE FUMARATE 50 MG: 25 TABLET ORAL at 09:11

## 2024-11-26 RX ADMIN — MELATONIN TAB 3 MG 6 MG: 3 TAB at 09:11

## 2024-11-26 RX ADMIN — IPRATROPIUM BROMIDE AND ALBUTEROL SULFATE 3 ML: 2.5; .5 SOLUTION RESPIRATORY (INHALATION) at 09:11

## 2024-11-26 RX ADMIN — INSULIN ASPART 8 UNITS: 100 INJECTION, SOLUTION INTRAVENOUS; SUBCUTANEOUS at 06:11

## 2024-11-26 RX ADMIN — IPRATROPIUM BROMIDE AND ALBUTEROL SULFATE 3 ML: 2.5; .5 SOLUTION RESPIRATORY (INHALATION) at 03:11

## 2024-11-26 RX ADMIN — IPRATROPIUM BROMIDE AND ALBUTEROL SULFATE 3 ML: 2.5; .5 SOLUTION RESPIRATORY (INHALATION) at 12:11

## 2024-11-26 RX ADMIN — GUAIFENESIN 200 MG: 100 SOLUTION ORAL at 05:11

## 2024-11-26 RX ADMIN — ATORVASTATIN CALCIUM 20 MG: 20 TABLET, FILM COATED ORAL at 09:11

## 2024-11-26 RX ADMIN — TIOTROPIUM BROMIDE INHALATION SPRAY 2 PUFF: 3.12 SPRAY, METERED RESPIRATORY (INHALATION) at 07:11

## 2024-11-26 RX ADMIN — INSULIN ASPART 3 UNITS: 100 INJECTION, SOLUTION INTRAVENOUS; SUBCUTANEOUS at 09:11

## 2024-11-26 NOTE — ED NOTES
11/26/24 0207   Safety Interventions   Level of Consciousness (AVPU) alert   Mood/Behavior cooperative;calm;behavior appropriate to situation   Updates Patient is resting comfortably;Bed rails up - Call light within reach;Vitals stable;Denies pain   Patient Rounds bed in low position;bed wheels locked;call light in patient/parent reach;clutter free environment maintained;ID band on;visualized patient;placement of personal items at bedside

## 2024-11-26 NOTE — SUBJECTIVE & OBJECTIVE
Interval History: He is feeling a little better today, had an episode of desaturation earlier when ambulating (to 82%) but later was able to take off the oxygen and walk to the bathroom without getting short of breath.  He is breathing comfortably now.  Congestion is a little better after getting Flonase.    Review of Systems   Constitutional:  Negative for chills and fever.   Respiratory:  Positive for shortness of breath. Negative for cough.         Dyspnea with exertion   Cardiovascular:  Negative for chest pain and palpitations.     Objective:     Vital Signs (Most Recent):  Temp: 98.5 °F (36.9 °C) (11/26/24 1233)  Pulse: 69 (11/26/24 1505)  Resp: 18 (11/26/24 1505)  BP: 130/80 (11/26/24 1233)  SpO2: 95 % (11/26/24 1505) Vital Signs (24h Range):  Temp:  [97.5 °F (36.4 °C)-98.5 °F (36.9 °C)] 98.5 °F (36.9 °C)  Pulse:  [] 69  Resp:  [16-20] 18  SpO2:  [82 %-97 %] 95 %  BP: (130-190)/(75-94) 130/80     Weight: 79.8 kg (176 lb)  Body mass index is 25.99 kg/m².    Intake/Output Summary (Last 24 hours) at 11/26/2024 1555  Last data filed at 11/26/2024 0643  Gross per 24 hour   Intake 500 ml   Output --   Net 500 ml         Physical Exam  HENT:      Nose: Congestion present.      Mouth/Throat:      Comments: Edentulous, speaking with a lisp  Cardiovascular:      Rate and Rhythm: Normal rate and regular rhythm.      Pulses: Normal pulses.      Heart sounds: Normal heart sounds. No murmur heard.     No gallop.   Pulmonary:      Effort: Pulmonary effort is normal.      Comments: End expiratory wheezing noted.  Breath sounds decreased bilaterally.  Abdominal:      General: Bowel sounds are normal.      Palpations: Abdomen is soft.   Skin:     General: Skin is warm and dry.   Neurological:      General: No focal deficit present.      Mental Status: He is alert and oriented to person, place, and time.             Significant Labs: All pertinent labs within the past 24 hours have been reviewed.    Significant Imaging: I  have reviewed all pertinent imaging results/findings within the past 24 hours.

## 2024-11-26 NOTE — ASSESSMENT & PLAN NOTE
- Severe hyperglycemia with BG consistently 400-500 range.  Not currently on steroids.  - No basal insulin ordered initially and med rec inaccurate - corrected with home dose basal insulin,10 units prandial insulin and moderate dose SSI  - BG better on home insulin but still hyperglycemic.  He says current levels are good for him.

## 2024-11-26 NOTE — ED NOTES
11/25/24 2020   Pain/Comfort/Sleep   Preferred Pain Scale number (Numeric Rating Pain Scale)   Comfort/Acceptable Pain Level 0   Cognitive   Cognitive/Neuro/Behavioral WDL WDL   Mood/Behavior calm;cooperative;behavior appropriate to situation   Rolfe Coma Scale   Best Eye Response 4-->(E4) spontaneous   Best Motor Response 6-->(M6) obeys commands   Best Verbal Response 5-->(V5) oriented   Zana Coma Scale Score 15   Motor Response   Motor Response general motor response   HEENT   HEENT WDL WDL   Mouth/Teeth WDL   Mouth/Teeth WDL WDL   Cardiac   Cardiac WDL WDL   Peripheral Neurovascular   Peripheral Neurovascular WDL WDL   Pulse Radial   Left Radial Pulse 2+ (normal)   Right Radial Pulse 2+ (normal)   Pulse Dorsalis Pedis   Left Dorsalis Pedis Pulse 2+ (normal)   Right Dorsalis Pedis Pulse 2+ (normal)   Skin   Skin WDL WDL   Agapito Risk Assessment   Sensory Perception 4-->no impairment   Moisture 4-->rarely moist   Activity 3-->walks occasionally   Mobility 4-->no limitation   Nutrition 3-->adequate   Friction and Shear 3-->no apparent problem   Agapito Score 21   Musculoskeletal   Musculoskeletal WDL WDL   Gastrointestinal   GI WDL WDL   Genitourinary   Genitourinary WDL WDL   Safety   Safety WDL WDL   Fall Risk Assessment (every shift)   History Of Fall (W/I 3 Mos) 0-->No   Age Greater Than 65 Years 2-->Yes   Altered Elimination 0-->No   Cognitive Deficit 0-->No   Sensory Deficit 0-->No   Dizziness/Vertigo 0-->No   Mobility Deficit/Weakness 0-->No   Safety Management   Safety Promotion/Fall Prevention assistive device/personal item within reach   Patient Rounds bed in low position

## 2024-11-26 NOTE — ASSESSMENT & PLAN NOTE
- Continue respiratory treatments  - Patient currently not on steroids due to severe hyperglycemia  - He is markedly hypoxemic and symptomatic with ambulation - possibly due to severe congestion not allowing good flow of the oxygen  - Will need refills on a lot of medications, not sure why he is running out of them.  - If not improved by tomorrow will consult pulmonary

## 2024-11-26 NOTE — ASSESSMENT & PLAN NOTE
Possibly a contributor to hypoxemia  Start Flonase (has been prescribed antihistamines and nasal steroids but has run out)  Might need Afrin although his BP has been quite high here

## 2024-11-26 NOTE — HPI
69M with COPD/emphysema, R lung adenoca (treated with radiation), prior prostate ca, thyroid nodule presenting for acute on chronic hypoxemic RF.     Had covid in September and then felt back to baseline until this weekend; on Saturday night noted that he was more breathless than usual on home O2. Uses 2L on full-size concentrator and 3L on portable. Oxygenation suboptimal, says that his O2 sat per pulse ox is usually around 84 until he recovers to low 90s. Has nebulizer but out of nebs, out of anoro, almost out of albuterol. Chronic sinus congestion which is worse, asserts having chest congestion and sore throat. O2 on admission in low 80s, with low 80s on walk test. Flu/covid negative. Blood sugar in 500s. CXR mildly improved from 9/2024 (when he had covid) but overall with increased opacities compared to earlier this year. Prior smoker, quit in 2022. PFTs in 2022 showing significant obstruction with severe ventilatory impairment. By the prior criteria, his bronchodilator challenge did not meet criteria for reversibility, however these parameters were redefined later in 2022 and now those values do meet criteria for bronchodilator response.     Cancer history (thanks to Dr. Vasquez and Dr. Bro): Stage IA3 (cT1c cN0 M0) RLL NSCLC (adeno) diagnosed on robotic bronch w/ EBUS 8/12/22. Biopsy of a RUL nodule, stations 7 and 11L/R nodes were negative for malignancy. PET/CT 9/6/22 demonstrated mild uptake in RLL nodule only. He completed definitive SBRT 55 Gy in 5 fx on 9/27/22.  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 Bro. Biopsy of an FDG avid RLL nodule was negative for malignancy; however, a 2.0 cm GGO in the RUL revealed lepidic adenocarcinoma. He completed definitive SBRT 50 Gy in 4 fx on 3/4/24.  History significant for high risk prostate cancer s/p EBRT 70 Gy in 28 fx +ADT with Dr. Villalobos completed 11/2020. Last visit with Dr. Vasquez  9/2024 with stable imaging findings; transitioned to q6mo surveillance.

## 2024-11-26 NOTE — ASSESSMENT & PLAN NOTE
Chronic respiratory failure with hypoxemia  - Continue respiratory treatments  - Patient currently not on steroids due to severe hyperglycemia  - He is markedly hypoxemic and symptomatic with ambulation - possibly due to severe congestion not allowing good flow of the oxygen  - Will need refills on a lot of medications, not sure why he is running out of them as he has plenty of doctors outpatient.  - Still having desat with activity - will consult pulmonary

## 2024-11-26 NOTE — PROGRESS NOTES
[facility] Emergency Dept (Observation)  [facility] Medicine  Progress Note    Patient Name: Lan Robles  MRN: [id number]  Patient Class: IP- Inpatient   Admission Date: 11/24/2024  Length of Stay: 1 days  Attending Physician: Darcy Carpenter MD  Primary Care Provider: Matthew Heath III, MD        Subjective:     Principal Problem:COPD with acute exacerbation        HPI:  Travis is a 69-year-old man with adenocarcinoma of the right upper lobe and COPD who presented yesterday with dyspnea on exertion and hypoxemia worse than his baseline on home oxygen that had been worsening for about a week.  He has run out of many of his medications, has a nebulizer that was prescribed when he had COVID last year but no neb solution.  His albuterol inhaler is almost empty and he says he does not have refills on that or his Anoro Ellipta.  He was admitted to the EDOU on respiratory treatments but did not improve overnight and is still having drops in his oxygen level to 82% with ambulation.  At baseline his oxygen saturation is around 90-92%.  Rapid COVID and influenza tests were negative, glucose was elevated, nothing acute on chest x-ray.  He is noted to have sinus congestion.    Medical history as noted above includes adenocarcinoma of the right lung diagnosed in 2022 and treated with radiation, now under surveillance.  He is diabetic and on insulin.  He has centrilobular emphysema and is on home oxygen as noted above.  He had prostate cancer in 2020 also treated with radiation completed 12/2020.  He quit smoking in 2022.    Overview/Hospital Course:  Patient admitted to hospital medicine from the ED for continued treatment of COPD exacerbation and hypoxemia.  He was continued on respiratory treatments.  He was markedly hyperglycemic and his home insulin regimen was resumed.      Interval History: He is feeling a little better today, had an episode of desaturation earlier when ambulating (to 82%) but later was able  to take off the oxygen and walk to the bathroom without getting short of breath.  He is breathing comfortably now.  Congestion is a little better after getting Flonase.    Review of Systems   Constitutional:  Negative for chills and fever.   Respiratory:  Positive for shortness of breath. Negative for cough.         Dyspnea with exertion   Cardiovascular:  Negative for chest pain and palpitations.     Objective:     Vital Signs (Most Recent):  Temp: 98.5 °F (36.9 °C) (11/26/24 1233)  Pulse: 69 (11/26/24 1505)  Resp: 18 (11/26/24 1505)  BP: 130/80 (11/26/24 1233)  SpO2: 95 % (11/26/24 1505) Vital Signs (24h Range):  Temp:  [97.5 °F (36.4 °C)-98.5 °F (36.9 °C)] 98.5 °F (36.9 °C)  Pulse:  [] 69  Resp:  [16-20] 18  SpO2:  [82 %-97 %] 95 %  BP: (130-190)/(75-94) 130/80     Weight: 79.8 kg (176 lb)  Body mass index is 25.99 kg/m².    Intake/Output Summary (Last 24 hours) at 11/26/2024 1555  Last data filed at 11/26/2024 0643  Gross per 24 hour   Intake 500 ml   Output --   Net 500 ml         Physical Exam  HENT:      Nose: Congestion present.      Mouth/Throat:      Comments: Edentulous, speaking with a lisp  Cardiovascular:      Rate and Rhythm: Normal rate and regular rhythm.      Pulses: Normal pulses.      Heart sounds: Normal heart sounds. No murmur heard.     No gallop.   Pulmonary:      Effort: Pulmonary effort is normal.      Comments: End expiratory wheezing noted.  Breath sounds decreased bilaterally.  Abdominal:      General: Bowel sounds are normal.      Palpations: Abdomen is soft.   Skin:     General: Skin is warm and dry.   Neurological:      General: No focal deficit present.      Mental Status: He is alert and oriented to person, place, and time.             Significant Labs: All pertinent labs within the past 24 hours have been reviewed.    Significant Imaging: I have reviewed all pertinent imaging results/findings within the past 24 hours.    Assessment/Plan:      * COPD with acute  exacerbation  Chronic respiratory failure with hypoxemia  - Continue respiratory treatments  - Patient currently not on steroids due to severe hyperglycemia  - He is markedly hypoxemic and symptomatic with ambulation - possibly due to severe congestion not allowing good flow of the oxygen  - Will need refills on a lot of medications, not sure why he is running out of them as he has plenty of doctors outpatient.  - Still having desat with activity - will consult pulmonary    Sinus congestion  Possibly a contributor to hypoxemia  Start Flonase (has been prescribed antihistamines and nasal steroids but has run out)  Might need Afrin although his BP has been quite high here      Type 2 diabetes mellitus with hyperglycemia, with long-term current use of insulin  - Severe hyperglycemia with BG consistently 400-500 range.  Not currently on steroids.  - No basal insulin ordered initially and med rec inaccurate - corrected with home dose basal insulin,10 units prandial insulin and moderate dose SSI  - BG better on home insulin but still hyperglycemic.  He says current levels are good for him.        Primary malignant neoplasm of right upper lobe of lung  Status post XRT  Recent PET done        VTE Risk Mitigation (From admission, onward)           Ordered     IP VTE HIGH RISK PATIENT  Once         11/24/24 1204     Place sequential compression device  Until discontinued         11/24/24 1204                    Discharge Planning   IZABELLA:      Code Status: Full Code   Is the patient medically ready for discharge?:     Reason for patient still in hospital (select all that apply): Patient trending condition, Treatment, and Consult recommendations                     Darcy Fitch MD  Department of Hospital Medicine   [facility], [place] - Emergency Dept (Observation)

## 2024-11-26 NOTE — ED NOTES
11/26/24 0634   Safety Interventions   Level of Consciousness (AVPU) alert   Mood/Behavior calm;behavior appropriate to situation;cooperative   Updates Patient is resting comfortably;Bed rails up - Call light within reach;Vitals stable;Denies pain   Patient Rounds bed in low position;bed wheels locked;call light in patient/parent reach;clutter free environment maintained;ID band on;visualized patient;placement of personal items at bedside

## 2024-11-26 NOTE — HOSPITAL COURSE
Patient admitted to hospital medicine from the [facility] for continued treatment of COPD exacerbation and hypoxemia.  He was continued on respiratory treatments.  He was markedly hyperglycemic on steroids and his home insulin regimen was resumed.      He continued to have hypoxemic episodes and was evaluated by pulmonary, extra nebs added plus steroids and Spiriva.  CT of the chest ordered to rule out radiation pneumonitis.  Findings were stable, with emphysema noted in the upper lobes, chronic scarring in the right lung and stable RLL spiculated nodules.  No focal consolidation or pleural effusion.      Pulmonologist recommended ICS/LABA/LAMA combination inhaler at home along with his albuterol inhaler and Duonebs.  These were prescribed and delivered to him prior to discharge.  Patient was also noted to have severe sinus congestion and was prescribed Flonase.  This could be an ongoing problem for him as he is oxygen dependent, and he might benefit from consultation with an ENT or allergist.  Pulmonary clinic will arrange for follow up with them.    Patient was seen and examined on the day of discharge and he was feeling better.  He still tends to have desaturation with activity and has been instructed to decrease the amount of time he is off oxygen as he has very little reserve.

## 2024-11-26 NOTE — ED NOTES
Pt described removing nasal cannula with ambulation to the restroom. Pt c/o of dizziness when he does this. Pt educated on why he needs constant oxygen supplementation and to not remove the nasal cannula. Pt instructed to hit call light prior to ambulation so he can be transferred to a portable O2 tank that is at his beside. Pt verbalized understanding.

## 2024-11-26 NOTE — PROCEDURES
"Instructions for measuring Oxygen Saturation  to qualify for Home Oxygen:     Please obtain and document (REPLACE # [id number] AND COPY AND PASTE TEXT INSIDE QUOTATION MARKS) the following for Home Oxygen:     This must be performed and documented within 2 days of discharge.     "Pulse Oximetry:   87% SpO2 on room air at rest on 11/26/2024                                If 88% or less, STOP and document.     If 89% or more, measure and  document the following:     "Pulse Oximetry:   87% SpO2 on room air at rest on 11/26/2024                            94% SpO2 on 3L oxgyen with activity/excercise on 11/26/2024     (NOTE:  FOR OXYGEN WITH ACTIVITY - MEDICARE WANTS TO SEE THAT THE OXYGEN INCREASES ONCE A PATIENT HAS WALKED AND IS BACK ON THE OXYGEN)    "

## 2024-11-26 NOTE — ED NOTES
11/26/24 0433   Safety Interventions   Level of Consciousness (AVPU) alert   Mood/Behavior calm;cooperative;behavior appropriate to situation   Updates Patient is resting comfortably;Bed rails up - Call light within reach;Vitals stable;Denies pain   Patient Rounds bed in low position;bed wheels locked;call light in patient/parent reach;clutter free environment maintained;ID band on;visualized patient;placement of personal items at bedside

## 2024-11-26 NOTE — ED NOTES
Attempted to walk pt with ambulatory pulse ox, spo2 96 at rest. Pt placed on 3 liters O2, when walked to the door pt o2 dropped to 82% and HR increased to 107. Pt walked back to bed.

## 2024-11-26 NOTE — HPI
Travis is a 69-year-old man with adenocarcinoma of the right upper lobe and COPD who presented yesterday with dyspnea on exertion and hypoxemia worse than his baseline on home oxygen that had been worsening for about a week.  He has run out of many of his medications, has a nebulizer that was prescribed when he had COVID last year but no neb solution.  His albuterol inhaler is almost empty and he says he does not have refills on that or his Anoro Ellipta.  He was admitted to the ED[facility] on respiratory treatments but did not improve overnight and is still having drops in his oxygen level to 82% with ambulation.  At baseline his oxygen saturation is around 90-92%.  Rapid COVID and influenza tests were negative, glucose was elevated, nothing acute on chest x-ray.  He is noted to have sinus congestion.    Medical history as noted above includes adenocarcinoma of the right lung diagnosed in 2022 and treated with radiation, now under surveillance.  He is diabetic and on insulin.  He has centrilobular emphysema and is on home oxygen as noted above.  He had prostate cancer in 2020 also treated with radiation completed 12/2020.  He quit smoking in 2022.

## 2024-11-26 NOTE — ASSESSMENT & PLAN NOTE
Possibly a contributor to hypoxemia  Start Flonase (has been prescribed antihistamines and nasal steroids but has run out)  Might need Afrin

## 2024-11-26 NOTE — ASSESSMENT & PLAN NOTE
- Severe hyperglycemia with BG consistently 400-500 range.  Not currently on steroids.  - No basal insulin ordered initially and med rec inaccurate - corrected with home dose basal insulin, 10 units prandial insulin and moderate dose SSI  - HbA1c pending

## 2024-11-26 NOTE — H&P
[facility] Emergency Dept ([facility])  [facility] Medicine  History & Physical    Patient Name: Lan Robles  MRN: [id number]  Patient Class: IP- Inpatient  Admission Date: 11/24/2024  Attending Physician: Darcy Carpenter MD   Primary Care Provider: Matthew Heath III, MD         Patient information was obtained from patient, past medical records, and ER records.     Subjective:     Principal Problem:COPD with acute exacerbation    Chief Complaint:   Chief Complaint   Patient presents with    Shortness of Breath     Pt arrived to ED complaining of increased SOB x2 weeks with CP that started last night. Pt speaking in one and two word sentences, noted to be 77% on 3LPM home O2.         HPI: Travis is a 69-year-old man with adenocarcinoma of the right upper lobe and COPD who presented yesterday with dyspnea on exertion and hypoxemia worse than his baseline on home oxygen that had been worsening for about a week.  He has run out of many of his medications, has a nebulizer that was prescribed when he had COVID last year but no neb solution.  His albuterol inhaler is almost empty and he says he does not have refills on that or his Anoro Ellipta.  He was admitted to the EDOU on respiratory treatments but did not improve overnight and is still having drops in his oxygen level to 82% with ambulation.  At baseline his oxygen saturation is around 90-92%.  Rapid COVID and influenza tests were negative, glucose was elevated, nothing acute on chest x-ray.  He is noted to have sinus congestion.    Medical history as noted above includes adenocarcinoma of the right lung diagnosed in 2022 and treated with radiation, now under surveillance.  He is diabetic and on insulin.  He has centrilobular emphysema and is on home oxygen as noted above.  He had prostate cancer in 2020 also treated with radiation completed 12/2020.  He quit smoking in 2022.    Past Medical History:   Diagnosis Date    Adenocarcinoma of lung, right  "08/2022    COPD (chronic obstructive pulmonary disease)     Diabetes mellitus     Prostate cancer 03/2020       Past Surgical History:   Procedure Laterality Date    ROBOTIC BRONCHOSCOPY N/A 8/12/2022    Procedure: ROBOTIC BRONCHOSCOPY;  Surgeon: Laney Bro MD;  Location: [address];  Service: Pulmonary;  Laterality: N/A;    ROBOTIC BRONCHOSCOPY N/A 2/2/2024    Procedure: ROBOTIC BRONCHOSCOPY;  Surgeon: Laney Bro MD;  Location: [address];  Service: Pulmonary;  Laterality: N/A;       Review of patient's allergies indicates:  No Known Allergies    No current facility-administered medications on file prior to encounter.     Current Outpatient Medications on File Prior to Encounter   Medication Sig    albuterol (PROVENTIL/VENTOLIN HFA) 90 mcg/actuation inhaler Inhale 2 puffs into the lungs every 6 (six) hours as needed.    BD BRENNA 2ND GEN PEN NEEDLE 32 gauge x 5/32" Ndle USE TO INJECT INSULIN UNDER THE SKIN EVERY DAY    ferrous sulfate (IRON, FERROUS SULFATE,) 325 mg (65 mg iron) Tab tablet Take 1 tablet (325 mg total) by mouth every other day.    metFORMIN (GLUCOPHAGE) 1000 MG tablet Take 1,000 mg by mouth 2 (two) times daily with meals.    QUEtiapine (SEROQUEL) 50 MG tablet Take 1 tablet (50 mg total) by mouth every evening.    simvastatin (ZOCOR) 40 MG tablet Take 1 tablet by mouth every evening.    tamsulosin (FLOMAX) 0.4 mg Cap Take 1 capsule by mouth once daily.    TRESIBA FLEXTOUCH U-100 100 unit/mL (3 mL) InPn Inject 36 Units into the skin every evening.    umeclidinium-vilanteroL (ANORO ELLIPTA) 62.5-25 mcg/actuation DsDv Inhale 1 puff into the lungs once daily. Controller    [DISCONTINUED] albuterol (PROVENTIL/VENTOLIN HFA) 90 mcg/actuation inhaler 1 puff as needed    [DISCONTINUED] albuterol-ipratropium (DUO-NEB) 2.5 mg-0.5 mg/3 mL nebulizer solution Take 3 mLs by nebulization every 6 (six) hours as needed for Wheezing. Rescue    [DISCONTINUED] bicalutamide (CASODEX) 50 MG Tab Take 1 " tablet (50 mg total) by mouth once daily.    [DISCONTINUED] cholecalciferol, vitamin D3, 1,250 mcg (50,000 unit) capsule TAKE 1 CAPSULE BY MOUTH 1 TIME EVERY WEEK    [DISCONTINUED] fluticasone propionate (FLONASE) 50 mcg/actuation nasal spray     [DISCONTINUED] levocetirizine (XYZAL) 5 MG tablet Take 1 tablet (5 mg total) by mouth every evening.    [DISCONTINUED] QUEtiapine (SEROQUEL) 50 MG tablet Take 50 mg by mouth every evening.    [DISCONTINUED] dulaglutide (TRULICITY SUBQ) Inject into the skin.    [DISCONTINUED] hydrOXYzine HCL (ATARAX) 25 MG tablet TAKE 1 TABLET BY MOUTH EVERY DAY AT BEDTIME AS NEEDED Oral for 30 Days    [DISCONTINUED] prednisoLONE acetate (PRED FORTE) 1 % DrpS SHAKE LIQUID AND INSTILL 1 DROP IN LEFT EYE FOUR TIMES DAILY AFTER SURGERY    [DISCONTINUED] pulse oximeter (PULSE OXIMETER) device by Apply Externally route 2 (two) times a day. Use twice daily at 8 AM and 3 PM and record the value in MyChart as directed.     Family History       Problem Relation (Age of Onset)    Cancer Father    Diabetes Mother          Tobacco Use    Smoking status: Former     Current packs/day: 0.00     Types: Cigarettes     Start date:      Quit date: 2022     Years since quittin.6    Smokeless tobacco: Never   Substance and Sexual Activity    Alcohol use: Yes    Drug use: Never    Sexual activity: Yes     Review of Systems   Constitutional:  Positive for fatigue. Negative for chills and fever.   HENT:  Negative for rhinorrhea and sore throat.    Eyes:  Negative for photophobia and visual disturbance.   Respiratory:  Positive for shortness of breath. Negative for cough.         Dyspnea with exertion   Cardiovascular:  Negative for chest pain and palpitations.   Gastrointestinal:  Negative for diarrhea and nausea.   Endocrine: Positive for polydipsia and polyuria.   Genitourinary:  Negative for dysuria and frequency.   Musculoskeletal:  Negative for back pain and gait problem.   Neurological:  Negative  for weakness and headaches.   Psychiatric/Behavioral:  Negative for confusion and dysphoric mood.      Objective:     Vital Signs (Most Recent):  Temp: 98.1 °F (36.7 °C) (11/25/24 1624)  Pulse: 77 (11/25/24 1624)  Resp: 17 (11/25/24 1624)  BP: (!) 190/89 (11/25/24 1624)  SpO2: (!) 93 % (11/25/24 1624) Vital Signs (24h Range):  Temp:  [97.8 °F (36.6 °C)-98.3 °F (36.8 °C)] 98.1 °F (36.7 °C)  Pulse:  [69-91] 77  Resp:  [16-20] 17  SpO2:  [93 %-98 %] 93 %  BP: (125-190)/(74-97) 190/89     Weight: 79.8 kg (176 lb)  Body mass index is 25.99 kg/m².     Physical Exam  HENT:      Head: Normocephalic.      Nose: Congestion present.      Comments: Significant sinus congestion noted     Mouth/Throat:      Mouth: Mucous membranes are moist.   Eyes:      Extraocular Movements: Extraocular movements intact.      Conjunctiva/sclera: Conjunctivae normal.      Pupils: Pupils are equal, round, and reactive to light.   Cardiovascular:      Rate and Rhythm: Normal rate and regular rhythm.      Pulses: Normal pulses.      Heart sounds: Normal heart sounds. No murmur heard.     No gallop.   Pulmonary:      Comments: Good effort, diffuse soft wheezing in all lung fields, worse on expiration.  Speaking in full sentences.  Abdominal:      General: Bowel sounds are normal.      Palpations: Abdomen is soft.   Musculoskeletal:         General: Normal range of motion.      Cervical back: Normal range of motion and neck supple.   Skin:     General: Skin is warm and dry.   Neurological:      General: No focal deficit present.      Mental Status: He is alert and oriented to person, place, and time.   Psychiatric:         Mood and Affect: Mood normal.         Behavior: Behavior normal.         Thought Content: Thought content normal.              CRANIAL NERVES     CN III, IV, VI   Pupils are equal, round, and reactive to light.       Significant Labs: All pertinent labs within the past 24 hours have been reviewed.    Significant Imaging: I have  reviewed all pertinent imaging results/findings within the past 24 hours.  Assessment/Plan:     * COPD with acute exacerbation  - Continue respiratory treatments  - Patient currently not on steroids due to severe hyperglycemia  - He is markedly hypoxemic and symptomatic with ambulation - possibly due to severe congestion not allowing good flow of the oxygen  - Will need refills on a lot of medications, not sure why he is running out of them.  - If not improved by tomorrow will consult pulmonary    Sinus congestion  Possibly a contributor to hypoxemia  Start Flonase (has been prescribed antihistamines and nasal steroids but has run out)  Might need Afrin      Type 2 diabetes mellitus with hyperglycemia, with long-term current use of insulin  - Severe hyperglycemia with BG consistently 400-500 range.  Not currently on steroids.  - No basal insulin ordered initially and med rec inaccurate - corrected with home dose basal insulin, 10 units prandial insulin and moderate dose SSI  - HbA1c pending        Primary malignant neoplasm of right upper lobe of lung  Status post XRT  Recent PET done        VTE Risk Mitigation (From admission, onward)           Ordered     IP VTE HIGH RISK PATIENT  Once         11/24/24 1204     Place sequential compression device  Until discontinued         11/24/24 1204

## 2024-11-26 NOTE — SUBJECTIVE & OBJECTIVE
"Past Medical History:   Diagnosis Date    Adenocarcinoma of lung, right 08/2022    COPD (chronic obstructive pulmonary disease)     Diabetes mellitus     Prostate cancer 03/2020       Past Surgical History:   Procedure Laterality Date    ROBOTIC BRONCHOSCOPY N/A 8/12/2022    Procedure: ROBOTIC BRONCHOSCOPY;  Surgeon: Laney Bro MD;  Location: [address];  Service: Pulmonary;  Laterality: N/A;    ROBOTIC BRONCHOSCOPY N/A 2/2/2024    Procedure: ROBOTIC BRONCHOSCOPY;  Surgeon: Laney Bro MD;  Location: [address];  Service: Pulmonary;  Laterality: N/A;       Review of patient's allergies indicates:  No Known Allergies    No current facility-administered medications on file prior to encounter.     Current Outpatient Medications on File Prior to Encounter   Medication Sig    albuterol (PROVENTIL/VENTOLIN HFA) 90 mcg/actuation inhaler Inhale 2 puffs into the lungs every 6 (six) hours as needed.    BD BRENNA 2ND GEN PEN NEEDLE 32 gauge x 5/32" Ndle USE TO INJECT INSULIN UNDER THE SKIN EVERY DAY    ferrous sulfate (IRON, FERROUS SULFATE,) 325 mg (65 mg iron) Tab tablet Take 1 tablet (325 mg total) by mouth every other day.    metFORMIN (GLUCOPHAGE) 1000 MG tablet Take 1,000 mg by mouth 2 (two) times daily with meals.    QUEtiapine (SEROQUEL) 50 MG tablet Take 1 tablet (50 mg total) by mouth every evening.    simvastatin (ZOCOR) 40 MG tablet Take 1 tablet by mouth every evening.    tamsulosin (FLOMAX) 0.4 mg Cap Take 1 capsule by mouth once daily.    TRESIBA FLEXTOUCH U-100 100 unit/mL (3 mL) InPn Inject 36 Units into the skin every evening.    umeclidinium-vilanteroL (ANORO ELLIPTA) 62.5-25 mcg/actuation DsDv Inhale 1 puff into the lungs once daily. Controller    [DISCONTINUED] albuterol (PROVENTIL/VENTOLIN HFA) 90 mcg/actuation inhaler 1 puff as needed    [DISCONTINUED] albuterol-ipratropium (DUO-NEB) 2.5 mg-0.5 mg/3 mL nebulizer solution Take 3 mLs by nebulization every 6 (six) hours as needed for " Wheezing. Rescue    [DISCONTINUED] bicalutamide (CASODEX) 50 MG Tab Take 1 tablet (50 mg total) by mouth once daily.    [DISCONTINUED] cholecalciferol, vitamin D3, 1,250 mcg (50,000 unit) capsule TAKE 1 CAPSULE BY MOUTH 1 TIME EVERY WEEK    [DISCONTINUED] fluticasone propionate (FLONASE) 50 mcg/actuation nasal spray     [DISCONTINUED] levocetirizine (XYZAL) 5 MG tablet Take 1 tablet (5 mg total) by mouth every evening.    [DISCONTINUED] QUEtiapine (SEROQUEL) 50 MG tablet Take 50 mg by mouth every evening.    [DISCONTINUED] dulaglutide (TRULICITY SUBQ) Inject into the skin.    [DISCONTINUED] hydrOXYzine HCL (ATARAX) 25 MG tablet TAKE 1 TABLET BY MOUTH EVERY DAY AT BEDTIME AS NEEDED Oral for 30 Days    [DISCONTINUED] prednisoLONE acetate (PRED FORTE) 1 % DrpS SHAKE LIQUID AND INSTILL 1 DROP IN LEFT EYE FOUR TIMES DAILY AFTER SURGERY    [DISCONTINUED] pulse oximeter (PULSE OXIMETER) device by Apply Externally route 2 (two) times a day. Use twice daily at 8 AM and 3 PM and record the value in [facility] as directed.     Family History       Problem Relation (Age of Onset)    Cancer Father    Diabetes Mother          Tobacco Use    Smoking status: Former     Current packs/day: 0.00     Types: Cigarettes     Start date:      Quit date: 2022     Years since quittin.6    Smokeless tobacco: Never   Substance and Sexual Activity    Alcohol use: Yes    Drug use: Never    Sexual activity: Yes     Review of Systems   Constitutional:  Positive for fatigue. Negative for chills and fever.   HENT:  Negative for rhinorrhea and sore throat.    Eyes:  Negative for photophobia and visual disturbance.   Respiratory:  Positive for shortness of breath. Negative for cough.         Dyspnea with exertion   Cardiovascular:  Negative for chest pain and palpitations.   Gastrointestinal:  Negative for diarrhea and nausea.   Endocrine: Positive for polydipsia and polyuria.   Genitourinary:  Negative for dysuria and frequency.    Musculoskeletal:  Negative for back pain and gait problem.   Neurological:  Negative for weakness and headaches.   Psychiatric/Behavioral:  Negative for confusion and dysphoric mood.      Objective:     Vital Signs (Most Recent):  Temp: 98.1 °F (36.7 °C) (11/25/24 1624)  Pulse: 77 (11/25/24 1624)  Resp: 17 (11/25/24 1624)  BP: (!) 190/89 (11/25/24 1624)  SpO2: (!) 93 % (11/25/24 1624) Vital Signs (24h Range):  Temp:  [97.8 °F (36.6 °C)-98.3 °F (36.8 °C)] 98.1 °F (36.7 °C)  Pulse:  [69-91] 77  Resp:  [16-20] 17  SpO2:  [93 %-98 %] 93 %  BP: (125-190)/(74-97) 190/89     Weight: 79.8 kg (176 lb)  Body mass index is 25.99 kg/m².     Physical Exam  HENT:      Head: Normocephalic.      Nose: Congestion present.      Comments: Significant sinus congestion noted     Mouth/Throat:      Mouth: Mucous membranes are moist.   Eyes:      Extraocular Movements: Extraocular movements intact.      Conjunctiva/sclera: Conjunctivae normal.      Pupils: Pupils are equal, round, and reactive to light.   Cardiovascular:      Rate and Rhythm: Normal rate and regular rhythm.      Pulses: Normal pulses.      Heart sounds: Normal heart sounds. No murmur heard.     No gallop.   Pulmonary:      Comments: Good effort, diffuse soft wheezing in all lung fields, worse on expiration.  Speaking in full sentences.  Abdominal:      General: Bowel sounds are normal.      Palpations: Abdomen is soft.   Musculoskeletal:         General: Normal range of motion.      Cervical back: Normal range of motion and neck supple.   Skin:     General: Skin is warm and dry.   Neurological:      General: No focal deficit present.      Mental Status: He is alert and oriented to person, place, and time.   Psychiatric:         Mood and Affect: Mood normal.         Behavior: Behavior normal.         Thought Content: Thought content normal.              CRANIAL NERVES     CN III, IV, VI   Pupils are equal, round, and reactive to light.       Significant Labs: All  pertinent labs within the past 24 hours have been reviewed.    Significant Imaging: I have reviewed all pertinent imaging results/findings within the past 24 hours.

## 2024-11-27 ENCOUNTER — PATIENT MESSAGE (OUTPATIENT)
Dept: RESEARCH | Facility: HOSPITAL | Age: 69
End: 2024-11-27
Payer: MEDICARE

## 2024-11-27 PROBLEM — J96.21 ACUTE ON CHRONIC HYPOXIC RESPIRATORY FAILURE: Status: ACTIVE | Noted: 2024-09-24

## 2024-11-27 LAB
ADENOVIRUS: NOT DETECTED
BORDETELLA PARAPERTUSSIS (IS1001): NOT DETECTED
BORDETELLA PERTUSSIS (PTXP): NOT DETECTED
CHLAMYDIA PNEUMONIAE: NOT DETECTED
CORONAVIRUS 229E, COMMON COLD VIRUS: NOT DETECTED
CORONAVIRUS HKU1, COMMON COLD VIRUS: NOT DETECTED
CORONAVIRUS NL63, COMMON COLD VIRUS: NOT DETECTED
CORONAVIRUS OC43, COMMON COLD VIRUS: NOT DETECTED
FLUBV RNA NPH QL NAA+NON-PROBE: NOT DETECTED
HPIV1 RNA NPH QL NAA+NON-PROBE: NOT DETECTED
HPIV2 RNA NPH QL NAA+NON-PROBE: NOT DETECTED
HPIV3 RNA NPH QL NAA+NON-PROBE: NOT DETECTED
HPIV4 RNA NPH QL NAA+NON-PROBE: NOT DETECTED
HUMAN METAPNEUMOVIRUS: NOT DETECTED
INFLUENZA A (SUBTYPES H1,H1-2009,H3): NOT DETECTED
MYCOPLASMA PNEUMONIAE: NOT DETECTED
POCT GLUCOSE: 197 MG/DL (ref 70–110)
POCT GLUCOSE: 251 MG/DL (ref 70–110)
POCT GLUCOSE: 290 MG/DL (ref 70–110)
POCT GLUCOSE: 319 MG/DL (ref 70–110)
RESPIRATORY INFECTION PANEL SOURCE: NORMAL
RSV RNA NPH QL NAA+NON-PROBE: NOT DETECTED
RV+EV RNA NPH QL NAA+NON-PROBE: NOT DETECTED
SARS-COV-2 RNA RESP QL NAA+PROBE: NOT DETECTED

## 2024-11-27 PROCEDURE — 94761 N-INVAS EAR/PLS OXIMETRY MLT: CPT

## 2024-11-27 PROCEDURE — 25000003 PHARM REV CODE 250: Performed by: HOSPITALIST

## 2024-11-27 PROCEDURE — 94640 AIRWAY INHALATION TREATMENT: CPT

## 2024-11-27 PROCEDURE — 25000003 PHARM REV CODE 250

## 2024-11-27 PROCEDURE — 94664 DEMO&/EVAL PT USE INHALER: CPT

## 2024-11-27 PROCEDURE — 25000242 PHARM REV CODE 250 ALT 637 W/ HCPCS

## 2024-11-27 PROCEDURE — 27000646 HC AEROBIKA DEVICE

## 2024-11-27 PROCEDURE — 63600175 PHARM REV CODE 636 W HCPCS: Performed by: HOSPITALIST

## 2024-11-27 PROCEDURE — 27000221 HC OXYGEN, UP TO 24 HOURS

## 2024-11-27 PROCEDURE — 99900035 HC TECH TIME PER 15 MIN (STAT)

## 2024-11-27 PROCEDURE — 11000001 HC ACUTE MED/SURG PRIVATE ROOM

## 2024-11-27 PROCEDURE — 63600175 PHARM REV CODE 636 W HCPCS: Performed by: STUDENT IN AN ORGANIZED HEALTH CARE EDUCATION/TRAINING PROGRAM

## 2024-11-27 RX ORDER — IPRATROPIUM BROMIDE AND ALBUTEROL SULFATE 2.5; .5 MG/3ML; MG/3ML
3 SOLUTION RESPIRATORY (INHALATION) EVERY 4 HOURS PRN
Qty: 90 ML | Refills: 0 | Status: SHIPPED | OUTPATIENT
Start: 2024-11-27

## 2024-11-27 RX ORDER — ALBUTEROL SULFATE 90 UG/1
2 INHALANT RESPIRATORY (INHALATION) EVERY 6 HOURS PRN
Qty: 8.5 G | Refills: 0 | Status: SHIPPED | OUTPATIENT
Start: 2024-11-27

## 2024-11-27 RX ORDER — QUETIAPINE FUMARATE 50 MG/1
50 TABLET, FILM COATED ORAL NIGHTLY
Qty: 30 TABLET | Refills: 0 | Status: SHIPPED | OUTPATIENT
Start: 2024-11-27 | End: 2024-12-27

## 2024-11-27 RX ORDER — GUAIFENESIN AND DEXTROMETHORPHAN HYDROBROMIDE 10; 100 MG/5ML; MG/5ML
10 SYRUP ORAL EVERY 4 HOURS PRN
Status: DISCONTINUED | OUTPATIENT
Start: 2024-11-27 | End: 2024-11-28 | Stop reason: HOSPADM

## 2024-11-27 RX ORDER — FLUTICASONE FUROATE, UMECLIDINIUM BROMIDE AND VILANTEROL TRIFENATATE 100; 62.5; 25 UG/1; UG/1; UG/1
1 POWDER RESPIRATORY (INHALATION) DAILY
Qty: 60 EACH | Refills: 0 | Status: SHIPPED | OUTPATIENT
Start: 2024-11-27

## 2024-11-27 RX ORDER — PREDNISONE 20 MG/1
40 TABLET ORAL DAILY
Qty: 6 TABLET | Refills: 0 | Status: SHIPPED | OUTPATIENT
Start: 2024-11-28 | End: 2024-12-01

## 2024-11-27 RX ORDER — GUAIFENESIN 600 MG/1
600 TABLET, EXTENDED RELEASE ORAL 2 TIMES DAILY
Status: DISCONTINUED | OUTPATIENT
Start: 2024-11-27 | End: 2024-11-28 | Stop reason: HOSPADM

## 2024-11-27 RX ADMIN — IPRATROPIUM BROMIDE AND ALBUTEROL SULFATE 3 ML: 2.5; .5 SOLUTION RESPIRATORY (INHALATION) at 11:11

## 2024-11-27 RX ADMIN — QUETIAPINE FUMARATE 50 MG: 25 TABLET ORAL at 09:11

## 2024-11-27 RX ADMIN — INSULIN ASPART 4 UNITS: 100 INJECTION, SOLUTION INTRAVENOUS; SUBCUTANEOUS at 05:11

## 2024-11-27 RX ADMIN — INSULIN ASPART 10 UNITS: 100 INJECTION, SOLUTION INTRAVENOUS; SUBCUTANEOUS at 01:11

## 2024-11-27 RX ADMIN — INSULIN ASPART 10 UNITS: 100 INJECTION, SOLUTION INTRAVENOUS; SUBCUTANEOUS at 09:11

## 2024-11-27 RX ADMIN — FERROUS SULFATE TAB 325 MG (65 MG ELEMENTAL FE) 1 EACH: 325 (65 FE) TAB at 09:11

## 2024-11-27 RX ADMIN — FLUTICASONE PROPIONATE 100 MCG: 50 SPRAY, METERED NASAL at 09:11

## 2024-11-27 RX ADMIN — INSULIN ASPART 6 UNITS: 100 INJECTION, SOLUTION INTRAVENOUS; SUBCUTANEOUS at 09:11

## 2024-11-27 RX ADMIN — INSULIN ASPART 10 UNITS: 100 INJECTION, SOLUTION INTRAVENOUS; SUBCUTANEOUS at 05:11

## 2024-11-27 RX ADMIN — GUAIFENESIN 600 MG: 600 TABLET, EXTENDED RELEASE ORAL at 09:11

## 2024-11-27 RX ADMIN — IPRATROPIUM BROMIDE AND ALBUTEROL SULFATE 3 ML: 2.5; .5 SOLUTION RESPIRATORY (INHALATION) at 03:11

## 2024-11-27 RX ADMIN — IPRATROPIUM BROMIDE AND ALBUTEROL SULFATE 3 ML: 2.5; .5 SOLUTION RESPIRATORY (INHALATION) at 08:11

## 2024-11-27 RX ADMIN — IPRATROPIUM BROMIDE AND ALBUTEROL SULFATE 3 ML: 2.5; .5 SOLUTION RESPIRATORY (INHALATION) at 04:11

## 2024-11-27 RX ADMIN — ATORVASTATIN CALCIUM 20 MG: 20 TABLET, FILM COATED ORAL at 09:11

## 2024-11-27 RX ADMIN — INSULIN GLARGINE 35 UNITS: 100 INJECTION, SOLUTION SUBCUTANEOUS at 01:11

## 2024-11-27 RX ADMIN — INSULIN ASPART 6 UNITS: 100 INJECTION, SOLUTION INTRAVENOUS; SUBCUTANEOUS at 01:11

## 2024-11-27 RX ADMIN — IPRATROPIUM BROMIDE AND ALBUTEROL SULFATE 3 ML: 2.5; .5 SOLUTION RESPIRATORY (INHALATION) at 07:11

## 2024-11-27 RX ADMIN — INSULIN ASPART 4 UNITS: 100 INJECTION, SOLUTION INTRAVENOUS; SUBCUTANEOUS at 09:11

## 2024-11-27 RX ADMIN — PREDNISONE 40 MG: 20 TABLET ORAL at 09:11

## 2024-11-27 RX ADMIN — FLUTICASONE FUROATE AND VILANTEROL TRIFENATATE 1 PUFF: 200; 25 POWDER RESPIRATORY (INHALATION) at 07:11

## 2024-11-27 NOTE — PROGRESS NOTES
[facility] Surg [address] Medicine  Progress Note    Patient Name: Lan Robles  MRN: [id number]  Patient Class: IP- Inpatient   Admission Date: 11/24/2024  Length of Stay: 2 days  Attending Physician: Darcy Carpenter MD  Primary Care Provider: Matthew Heath III, MD        Subjective:     Principal Problem:COPD with acute exacerbation        HPI:  Travis is a 69-year-old man with adenocarcinoma of the right upper lobe and COPD who presented yesterday with dyspnea on exertion and hypoxemia worse than his baseline on home oxygen that had been worsening for about a week.  He has run out of many of his medications, has a nebulizer that was prescribed when he had COVID last year but no neb solution.  His albuterol inhaler is almost empty and he says he does not have refills on that or his Anoro Ellipta.  He was admitted to the EDOU on respiratory treatments but did not improve overnight and is still having drops in his oxygen level to 82% with ambulation.  At baseline his oxygen saturation is around 90-92%.  Rapid COVID and influenza tests were negative, glucose was elevated, nothing acute on chest x-ray.  He is noted to have sinus congestion.    Medical history as noted above includes adenocarcinoma of the right lung diagnosed in 2022 and treated with radiation, now under surveillance.  He is diabetic and on insulin.  He has centrilobular emphysema and is on home oxygen as noted above.  He had prostate cancer in 2020 also treated with radiation completed 12/2020.  He quit smoking in 2022.    Overview/Hospital Course:  Patient admitted to hospital medicine from the ED for continued treatment of COPD exacerbation and hypoxemia.  He was continued on respiratory treatments.  He was markedly hyperglycemic and his home insulin regimen was resumed.      He continued to have hypoxemic episodes and was evaluated by pulmonary, extra nebs added plus steroids and Spiriva.  CT of the chest ordered  to rule out radiation pneumonitis.  Findings were stable, with emphysema noted in the upper lobes, chronic scarring in the right lung and stable RLL spiculated nodules.  No focal consolidation or pleural effusion.  Pulmonologist recommended ICS/LABA/LAMA combination inhaler at home along with his albuterol inhaler and Duonebs.      Interval History:  Patient resting, appears short of breath, has ambulated recently.  Says he feels better overall.    Review of Systems   Constitutional:  Negative for chills and fever.   Respiratory:  Positive for shortness of breath. Negative for cough.         Dyspnea with exertion   Cardiovascular:  Negative for chest pain and palpitations.     Objective:     Vital Signs (Most Recent):  Temp: 97.5 °F (36.4 °C) (11/27/24 0740)  Pulse: 72 (11/27/24 1600)  Resp: (!) 24 (11/27/24 1600)  BP: (!) 180/86 (11/27/24 1538)  SpO2: (!) 92 % (11/27/24 1600) Vital Signs (24h Range):  Temp:  [97.5 °F (36.4 °C)-98.2 °F (36.8 °C)] 97.5 °F (36.4 °C)  Pulse:  [64-82] 72  Resp:  [15-24] 24  SpO2:  [92 %-98 %] 92 %  BP: (149-180)/(76-88) 180/86     Weight: 79.8 kg (175 lb 14.8 oz)  Body mass index is 25.98 kg/m².    Intake/Output Summary (Last 24 hours) at 11/27/2024 1639  Last data filed at 11/27/2024 0614  Gross per 24 hour   Intake 480 ml   Output --   Net 480 ml         Physical Exam  HENT:      Nose: Congestion present.      Mouth/Throat:      Comments: Edentulous, speaking with a lisp  Cardiovascular:      Rate and Rhythm: Normal rate and regular rhythm.      Pulses: Normal pulses.      Heart sounds: Normal heart sounds. No murmur heard.     No gallop.   Pulmonary:      Effort: Pulmonary effort is normal.      Comments: End expiratory wheezing noted.  Breath sounds decreased bilaterally.  Abdominal:      General: Bowel sounds are normal.      Palpations: Abdomen is soft.   Skin:     General: Skin is warm and dry.   Neurological:      General: No focal deficit present.      Mental Status: He is  alert and oriented to person, place, and time.             Significant Labs: All pertinent labs within the past 24 hours have been reviewed.    Significant Imaging: I have reviewed all pertinent imaging results/findings within the past 24 hours.    Assessment/Plan:      * COPD with acute exacerbation  Acute on chronic respiratory failure with hypoxemia  - Continue respiratory treatments  - Patient initially not on steroids due to severe hyperglycemia - started yesterday as BG much better  - He is markedly hypoxemic and symptomatic with ambulation - possibly due to severe congestion not allowing good flow of the oxygen  - Will need refills on a lot of medications, not sure why he is running out of them as he has plenty of doctors outpatient.  - Still having desat with activity and pulmonary consulted - plan to treat COPD exacerbation more aggressively, ensure good follow up and make sure he has ICS/LABA/LAMA for discharge, Duonebs, albuterol rescue inhaler.  - Added guaifenesin 600 mg bid for congestion, continue Flonase for sinus congestion.    Sinus congestion  Possibly a contributor to hypoxemia  Started Flonase (has been prescribed antihistamines and nasal steroids but has run out), added guaifenesin  Will avoid giving Afrin given BP lability      Type 2 diabetes mellitus with hyperglycemia, with long-term current use of insulin  - Severe hyperglycemia with BG consistently 400-500 range.  Not currently on steroids.  - No basal insulin ordered initially and med rec inaccurate - corrected with home dose basal insulin,10 units prandial insulin and moderate dose SSI  - BG better on home insulin but still hyperglycemic.  He says current levels are good for him.        Primary malignant neoplasm of right upper lobe of lung  Status post XRT  Recent PET done  CT shows stability (11/26)      VTE Risk Mitigation (From admission, onward)           Ordered     IP VTE HIGH RISK PATIENT  Once         11/24/24 1204     Place  sequential compression device  Until discontinued         11/24/24 1204                    Discharge Planning   IZABELLA: 11/29/2024     Code Status: Full Code   Is the patient medically ready for discharge?:     Reason for patient still in hospital (select all that apply): Patient trending condition, Treatment, and Consult recommendations  Discharge Plan A: Home                  Darcy Fitch MD  Department of Hospital Medicine   [facility] S[facility] ([address])

## 2024-11-27 NOTE — ED NOTES
Upon entry room, pt resting in bed, call light in reach. guest at bedside. Pt AAOx4, Respirations even and unlabored. No acute distress. All needs addressed at this time. Pt updated on plan of care. Plan of care ongoing at this time.

## 2024-11-27 NOTE — ASSESSMENT & PLAN NOTE
S/p radiation tx with no progression as of September and now on surveillance.   Low on ddx but worth ruling out radiation pneumonitis (which can evolve for several months); would get CT chest non-con.

## 2024-11-27 NOTE — ASSESSMENT & PLAN NOTE
Likely a bit under-oxygenated on portable concentrator and while ambulating. Hypoxia is symptomatic since the weekend, which has not been the case; suspect COPD exacerbation.   - RVP given sore throat, sinus congestion; agree with flonase  - keep O2 sats around 90%; have discussed with him  - needs to see Pulm for COPD care; I will book him into clinic on d/c; if he can be discharged with LABA/LAMA/ICS as well as duonebs and rescue albuterol inhaler, would be appreciated (happy to order these, just let me know)  - c/o chest congestion, agree with guiafenesin, added HTS and flutter valve to his q4H duonebs to help him clear mucus  - if he coughs up a sample, would send it for bacterial culture  - started inhalers (breo, spiriva) tonight; can wean back duonebs tomorrow accordingly

## 2024-11-27 NOTE — HOSPITAL COURSE
Treated with q4H duonebs, flonase, guaifenesin. Sugars controlled. Pt asserts feeling improved, though not to baseline.

## 2024-11-27 NOTE — CONSULTS
[facility] Emergency Dept (Observation)  Pulmonology  Consult Note    Patient Name: Lan Robles  MRN: [id number]  Admission Date: 11/24/2024  Hospital Length of Stay: 1 days  Code Status: Full Code  Attending Physician: Darcy Carpenter MD  Primary Care Provider: Matthew Heath III, MD   Principal Problem: COPD with acute exacerbation    Inpatient consult to Pulmonary Critical Care  Consult performed by: Black Iniguez MD  Consult ordered by: Darcy Carpenter MD        Subjective:     HPI:  69M with COPD/emphysema, R lung adenoca (treated with radiation), prior prostate ca, thyroid nodule presenting for acute on chronic hypoxemic RF.     Had covid in September and then felt back to baseline until this weekend; on Saturday night noted that he was more breathless than usual on home O2. Uses 2L on full-size concentrator and 3L on portable. Oxygenation suboptimal, says that his O2 sat per pulse ox is usually around 84 until he recovers to low 90s. Has nebulizer but out of nebs, out of anoro, almost out of albuterol. Chronic sinus congestion which is worse, asserts having chest congestion and sore throat. O2 on admission in low 80s, with low 80s on walk test. Flu/covid negative. Blood sugar in 500s. CXR mildly improved from 9/2024 (when he had covid) but overall with increased opacities compared to earlier this year. Prior smoker, quit in 2022. PFTs in 2022 showing significant obstruction with severe ventilatory impairment. By the prior criteria, his bronchodilator challenge did not meet criteria for reversibility, however these parameters were redefined later in 2022 and now those values do meet criteria for bronchodilator response.     Cancer history (thanks to Dr. Vasquez and Dr. Bro): Stage IA3 (cT1c cN0 M0) RLL NSCLC (adeno) diagnosed on robotic bronch w/ EBUS 8/12/22. Biopsy of a RUL nodule, stations 7 and 11L/R nodes were negative for malignancy. PET/CT 9/6/22 demonstrated mild uptake in RLL  nodule only. He completed definitive SBRT 55 Gy in 5 fx on 22.  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 Bro. Biopsy of an FDG avid RLL nodule was negative for malignancy; however, a 2.0 cm GGO in the RUL revealed lepidic adenocarcinoma. He completed definitive SBRT 50 Gy in 4 fx on 3/4/24.  History significant for high risk prostate cancer s/p EBRT 70 Gy in 28 fx +ADT with Dr. Villalobos completed 2020. Last visit with Dr. Vasquez 2024 with stable imaging findings; transitioned to q6mo surveillance.    Past Medical History:   Diagnosis Date    Adenocarcinoma of lung, right 2022    COPD (chronic obstructive pulmonary disease)     Diabetes mellitus     Prostate cancer 2020       Past Surgical History:   Procedure Laterality Date    ROBOTIC BRONCHOSCOPY N/A 2022    Procedure: ROBOTIC BRONCHOSCOPY;  Surgeon: Laney Bro MD;  Location: [facility] OR [address] [place] [address];  Service: Pulmonary;  Laterality: N/A;    ROBOTIC BRONCHOSCOPY N/A 2024    Procedure: ROBOTIC BRONCHOSCOPY;  Surgeon: Laney Bro MD;  Location: [facility] OR [address] [place] [address];  Service: Pulmonary;  Laterality: N/A;       Review of patient's allergies indicates:  No Known Allergies    Family History       Problem Relation (Age of Onset)    Cancer Father    Diabetes Mother          Tobacco Use    Smoking status: Former     Current packs/day: 0.00     Types: Cigarettes     Start date:      Quit date: 2022     Years since quittin.6    Smokeless tobacco: Never   Substance and Sexual Activity    Alcohol use: Yes    Drug use: Never    Sexual activity: Yes         Review of Systems   Constitutional: Negative.    HENT:  Positive for congestion.    Eyes: Negative.    Respiratory:  Positive for cough, shortness of breath and wheezing.    Cardiovascular: Negative.    Gastrointestinal: Negative.    Endocrine: Negative.    Genitourinary: Negative.    Musculoskeletal: Negative.     Skin: Negative.    Allergic/Immunologic: Negative.    Neurological: Negative.    Hematological: Negative.    Psychiatric/Behavioral: Negative.       Objective:     Vital Signs (Most Recent):  Temp: 98 °F (36.7 °C) (11/26/24 1605)  Pulse: 87 (11/26/24 1605)  Resp: 16 (11/26/24 1605)  BP: (!) 149/84 (11/26/24 1605)  SpO2: 95 % (11/26/24 1605) Vital Signs (24h Range):  Temp:  [97.5 °F (36.4 °C)-98.5 °F (36.9 °C)] 98 °F (36.7 °C)  Pulse:  [] 87  Resp:  [16-20] 16  SpO2:  [82 %-97 %] 95 %  BP: (130-189)/(75-94) 149/84     Weight: 79.8 kg (176 lb)  Body mass index is 25.99 kg/m².      Intake/Output Summary (Last 24 hours) at 11/26/2024 1810  Last data filed at 11/26/2024 0643  Gross per 24 hour   Intake 500 ml   Output --   Net 500 ml        Physical Exam  Constitutional:       General: He is not in acute distress.  HENT:      Head: Normocephalic.      Comments: adentulous     Nose: Congestion present.      Mouth/Throat:      Mouth: Mucous membranes are moist.      Pharynx: Oropharynx is clear.   Eyes:      Extraocular Movements: Extraocular movements intact.      Pupils: Pupils are equal, round, and reactive to light.   Cardiovascular:      Rate and Rhythm: Normal rate and regular rhythm.   Pulmonary:      Effort: Pulmonary effort is normal.      Breath sounds: Decreased breath sounds and rales present. No wheezing.   Abdominal:      General: There is no distension.      Tenderness: There is no abdominal tenderness.   Musculoskeletal:      Cervical back: Normal range of motion.      Right lower leg: No edema.      Left lower leg: No edema.   Skin:     Coloration: Skin is not cyanotic.      Nails: There is no clubbing.   Neurological:      General: No focal deficit present.      Mental Status: He is alert and oriented to person, place, and time.   Psychiatric:         Behavior: Behavior normal.          Vents:       Lines/Drains/Airways       Peripheral Intravenous Line  Duration                  Peripheral IV -  Single Lumen 11/24/24 0857 20 G Right Antecubital 2 days                    Significant Labs:    CBC/Anemia Profile:  Recent Labs   Lab 11/25/24  0528   WBC 9.78   HGB 12.6*   HCT 40.0      MCV 86   RDW 15.3*        Chemistries:  Recent Labs   Lab 11/25/24  0619 11/25/24  1232 11/26/24  0802    133* 140   K 5.1 4.7 4.0    98 104   CO2 25 22* 25   BUN 29* 28* 30*   CREATININE 1.5* 1.6* 1.5*   CALCIUM 9.8 9.6 9.7   ALBUMIN 3.9  --   --    PROT 7.6  --   --    BILITOT 0.2  --   --    ALKPHOS 93  --   --    ALT 24  --   --    AST 13  --   --    MG  --   --  2.2       All pertinent labs within the past 24 hours have been reviewed.    Significant Imaging:   I have reviewed all pertinent imaging results/findings within the past 24 hours.  Assessment/Plan:     Pulmonary  Chronic hypoxic respiratory failure, on home oxygen therapy  Likely a bit under-oxygenated on portable concentrator and while ambulating. Hypoxia is symptomatic since the weekend, which has not been the case; suspect COPD exacerbation.   - RVP given sore throat, sinus congestion; agree with flonase  - keep O2 sats around 90%; have discussed with him  - needs to see Pulm for COPD care; I will book him into clinic on d/c; if he can be discharged with LABA/LAMA/ICS as well as duonebs and rescue albuterol inhaler, would be appreciated (happy to order these, just let me know)  - c/o chest congestion, agree with guiafenesin, added HTS and flutter valve to his q4H duonebs to help him clear mucus  - if he coughs up a sample, would send it for bacterial culture  - started inhalers (breo, spiriva) tonight; can wean back duonebs tomorrow accordingly    Oncology  Primary malignant neoplasm of right upper lobe of lung  S/p radiation tx with no progression as of September and now on surveillance.   Low on ddx but worth ruling out radiation pneumonitis (which can evolve for several months); would get CT chest non-con.    Endocrine  Type 2 diabetes  mellitus with hyperglycemia, with long-term current use of insulin  Would treat with 40mg of prednisone for 5 days now that sugars are more controlled inpatient.      Pt seen and examined with Dr. Jordan attending. I have taken the liberty of ordering the above inpatient recs; call anytime with questions. We will continue to follow.     Black Iniguez MD  Pulmonology  [facility] - Emergency Dept (Observation)

## 2024-11-27 NOTE — SUBJECTIVE & OBJECTIVE
Interval History:  Patient resting, appears short of breath, has ambulated recently.  Says he feels better overall.    Review of Systems   Constitutional:  Negative for chills and fever.   Respiratory:  Positive for shortness of breath. Negative for cough.         Dyspnea with exertion   Cardiovascular:  Negative for chest pain and palpitations.     Objective:     Vital Signs (Most Recent):  Temp: 97.5 °F (36.4 °C) (11/27/24 0740)  Pulse: 72 (11/27/24 1600)  Resp: (!) 24 (11/27/24 1600)  BP: (!) 180/86 (11/27/24 1538)  SpO2: (!) 92 % (11/27/24 1600) Vital Signs (24h Range):  Temp:  [97.5 °F (36.4 °C)-98.2 °F (36.8 °C)] 97.5 °F (36.4 °C)  Pulse:  [64-82] 72  Resp:  [15-24] 24  SpO2:  [92 %-98 %] 92 %  BP: (149-180)/(76-88) 180/86     Weight: 79.8 kg (175 lb 14.8 oz)  Body mass index is 25.98 kg/m².    Intake/Output Summary (Last 24 hours) at 11/27/2024 1639  Last data filed at 11/27/2024 0614  Gross per 24 hour   Intake 480 ml   Output --   Net 480 ml         Physical Exam  HENT:      Nose: Congestion present.      Mouth/Throat:      Comments: Edentulous, speaking with a lisp  Cardiovascular:      Rate and Rhythm: Normal rate and regular rhythm.      Pulses: Normal pulses.      Heart sounds: Normal heart sounds. No murmur heard.     No gallop.   Pulmonary:      Effort: Pulmonary effort is normal.      Comments: End expiratory wheezing noted.  Breath sounds decreased bilaterally.  Abdominal:      General: Bowel sounds are normal.      Palpations: Abdomen is soft.   Skin:     General: Skin is warm and dry.   Neurological:      General: No focal deficit present.      Mental Status: He is alert and oriented to person, place, and time.             Significant Labs: All pertinent labs within the past 24 hours have been reviewed.    Significant Imaging: I have reviewed all pertinent imaging results/findings within the past 24 hours.

## 2024-11-27 NOTE — PLAN OF CARE
Problem: Adult Inpatient Plan of Care  Goal: Plan of Care Review  11/27/2024 0609 by Fani Murray RN  Outcome: Progressing  11/27/2024 0609 by Fani Murray RN  Outcome: Progressing  Goal: Patient-Specific Goal (Individualized)  11/27/2024 0609 by Fani Murray RN  Outcome: Progressing  11/27/2024 0609 by Fani Murray RN  Outcome: Progressing  Goal: Absence of Hospital-Acquired Illness or Injury  11/27/2024 0609 by Fani Murray RN  Outcome: Progressing  11/27/2024 0609 by Fani Murray RN  Outcome: Progressing  Goal: Optimal Comfort and Wellbeing  11/27/2024 0609 by Fani Murray RN  Outcome: Progressing  11/27/2024 0609 by Fani Murray RN  Outcome: Progressing  Goal: Readiness for Transition of Care  11/27/2024 0609 by Fani Murray RN  Outcome: Progressing  11/27/2024 0609 by Fani Murray RN  Outcome: Progressing     Problem: Diabetes Comorbidity  Goal: Blood Glucose Level Within Targeted Range  11/27/2024 0609 by Fani Murray RN  Outcome: Progressing  11/27/2024 0609 by Fani Murray RN  Outcome: Progressing     Problem: COPD (Chronic Obstructive Pulmonary Disease)  Goal: Optimal Chronic Illness Coping  11/27/2024 0609 by Fani Murray RN  Outcome: Progressing  11/27/2024 0609 by Fani Murray RN  Outcome: Progressing  Goal: Optimal Level of Functional [place]  11/27/2024 0609 by Fani Murray RN  Outcome: Progressing  11/27/2024 0609 by Fani Murray RN  Outcome: Progressing  Goal: Absence of Infection Signs and Symptoms  11/27/2024 0609 by Fani Murray RN  Outcome: Progressing  11/27/2024 0609 by Fani Murray RN  Outcome: Progressing  Goal: Improved Oral Intake  11/27/2024 0609 by Fani Murray RN  Outcome: Progressing  11/27/2024 0609 by Fani Murray RN  Outcome: Progressing  Goal: Effective Oxygenation and Ventilation  11/27/2024 0609 by Fani Murray RN  Outcome: Progressing  11/27/2024 0609 by Fani Murray, RN  Outcome: Progressing

## 2024-11-27 NOTE — SUBJECTIVE & OBJECTIVE
Past Medical History:   Diagnosis Date    Adenocarcinoma of lung, right 2022    COPD (chronic obstructive pulmonary disease)     Diabetes mellitus     Prostate cancer 2020       Past Surgical History:   Procedure Laterality Date    ROBOTIC BRONCHOSCOPY N/A 2022    Procedure: ROBOTIC BRONCHOSCOPY;  Surgeon: Laney Bro MD;  Location: [address];  Service: Pulmonary;  Laterality: N/A;    ROBOTIC BRONCHOSCOPY N/A 2024    Procedure: ROBOTIC BRONCHOSCOPY;  Surgeon: Laney Bro MD;  Location: [facility] OR [address] [place] [address];  Service: Pulmonary;  Laterality: N/A;       Review of patient's allergies indicates:  No Known Allergies    Family History       Problem Relation (Age of Onset)    Cancer Father    Diabetes Mother          Tobacco Use    Smoking status: Former     Current packs/day: 0.00     Types: Cigarettes     Start date:      Quit date: 2022     Years since quittin.6    Smokeless tobacco: Never   Substance and Sexual Activity    Alcohol use: Yes    Drug use: Never    Sexual activity: Yes         Review of Systems   Constitutional: Negative.    HENT:  Positive for congestion.    Eyes: Negative.    Respiratory:  Positive for cough, shortness of breath and wheezing.    Cardiovascular: Negative.    Gastrointestinal: Negative.    Endocrine: Negative.    Genitourinary: Negative.    Musculoskeletal: Negative.    Skin: Negative.    Allergic/Immunologic: Negative.    Neurological: Negative.    Hematological: Negative.    Psychiatric/Behavioral: Negative.       Objective:     Vital Signs (Most Recent):  Temp: 98 °F (36.7 °C) (24 1605)  Pulse: 87 (24 1605)  Resp: 16 (24 1605)  BP: (!) 149/84 (24 1605)  SpO2: 95 % (24 1605) Vital Signs (24h Range):  Temp:  [97.5 °F (36.4 °C)-98.5 °F (36.9 °C)] 98 °F (36.7 °C)  Pulse:  [] 87  Resp:  [16-20] 16  SpO2:  [82 %-97 %] 95 %  BP: (130-189)/(75-94) 149/84     Weight: 79.8 kg (176 lb)  Body mass index is 25.99  kg/m².      Intake/Output Summary (Last 24 hours) at 11/26/2024 1810  Last data filed at 11/26/2024 0643  Gross per 24 hour   Intake 500 ml   Output --   Net 500 ml        Physical Exam  Constitutional:       General: He is not in acute distress.  HENT:      Head: Normocephalic.      Comments: adentulous     Nose: Congestion present.      Mouth/Throat:      Mouth: Mucous membranes are moist.      Pharynx: Oropharynx is clear.   Eyes:      Extraocular Movements: Extraocular movements intact.      Pupils: Pupils are equal, round, and reactive to light.   Cardiovascular:      Rate and Rhythm: Normal rate and regular rhythm.   Pulmonary:      Effort: Pulmonary effort is normal.      Breath sounds: Decreased breath sounds and rales present. No wheezing.   Abdominal:      General: There is no distension.      Tenderness: There is no abdominal tenderness.   Musculoskeletal:      Cervical back: Normal range of motion.      Right lower leg: No edema.      Left lower leg: No edema.   Skin:     Coloration: Skin is not cyanotic.      Nails: There is no clubbing.   Neurological:      General: No focal deficit present.      Mental Status: He is alert and oriented to person, place, and time.   Psychiatric:         Behavior: Behavior normal.          Vents:       Lines/Drains/Airways       Peripheral Intravenous Line  Duration                  Peripheral IV - Single Lumen 11/24/24 0857 20 G Right Antecubital 2 days                    Significant Labs:    CBC/Anemia Profile:  Recent Labs   Lab 11/25/24  0528   WBC 9.78   HGB 12.6*   HCT 40.0      MCV 86   RDW 15.3*        Chemistries:  Recent Labs   Lab 11/25/24  0619 11/25/24  1232 11/26/24  0802    133* 140   K 5.1 4.7 4.0    98 104   CO2 25 22* 25   BUN 29* 28* 30*   CREATININE 1.5* 1.6* 1.5*   CALCIUM 9.8 9.6 9.7   ALBUMIN 3.9  --   --    PROT 7.6  --   --    BILITOT 0.2  --   --    ALKPHOS 93  --   --    ALT 24  --   --    AST 13  --   --    MG  --   --  2.2        All pertinent labs within the past 24 hours have been reviewed.    Significant Imaging:   I have reviewed all pertinent imaging results/findings within the past 24 hours.

## 2024-11-27 NOTE — PLAN OF CARE
Assessment completed at bedside with patient---patient alert and oriented. Patient utilizes home O2. Patient denies HH, dialysis and coumadin. Patient takes meds as prescribed and can afford with insurance. Patient daughter will provide transportation home at discharge.     [facility] - Med Surg ([address])  Initial Discharge Assessment       Primary Care Provider: Matthew Heath III, MD    Admission Diagnosis: SOB (shortness of breath) [R06.02]  Hypoxia [R09.02]  COPD exacerbation [J44.1]  ADEN (acute kidney injury) [N17.9]  COPD with acute exacerbation [J44.1]  Type 2 diabetes mellitus with hyperglycemia, with long-term current use of insulin [E11.65, Z79.4]    Admission Date: 11/24/2024  Expected Discharge Date:     Transition of Care Barriers: None    Payor: [employer] [facility] / Plan: [employer] / Product Type: Medicare Advantage /     Extended Emergency Contact Information  Primary Emergency Contact: NASRA FLOR  Mobile Phone: [phone]  Relation: Daughter  Preferred language: English   needed? No    Discharge Plan A: Home  Discharge Plan B: [facility] [employer] [facility]      [id number] [place], [place] - [address] [facility] [address]  [address] [facility] [address]  Phone: [phone] Fax: [phone]      Initial Assessment (most recent)       Adult Discharge Assessment - 11/27/24 1051          Discharge Assessment    Assessment Type Discharge Planning Assessment     Confirmed/corrected address, phone number and insurance Yes     Confirmed Demographics Correct on Facesheet     Source of Information patient     Communicated IZABELLA with patient/caregiver Date not available/Unable to determine     People in Home alone     Do you expect to return to your current living situation? Yes     Do you have help at home or someone to help you manage your care at home? No     Prior to hospitilization cognitive status: Alert/Oriented     Current cognitive status:  Alert/Oriented     Walking or Climbing Stairs Difficulty no     Dressing/Bathing Difficulty no     Equipment Currently Used at Home oxygen     Readmission within 30 days? No     Patient currently being followed by outpatient case management? No     Do you currently have service(s) that help you manage your care at home? No     Do you take prescription medications? Yes     Do you have prescription coverage? Yes     Coverage PHN     Do you have any problems affording any of your prescribed medications? No     Is the patient taking medications as prescribed? yes     Who is going to help you get home at discharge? NASRA FLOR (Daughter)  [phone]     How do you get to doctors appointments? public transportation     Are you on dialysis? No     Do you take coumadin? No     Discharge Plan A Home     Discharge Plan B Home Health     DME Needed Upon Discharge  none     Discharge Plan discussed with: Patient     Transition of Care Barriers None        Physical Activity    On average, how many days per week do you engage in moderate to strenuous exercise (like a brisk walk)? 0 days     On average, how many minutes do you engage in exercise at this level? 0 min

## 2024-11-27 NOTE — ASSESSMENT & PLAN NOTE
Acute on chronic respiratory failure with hypoxemia  - Continue respiratory treatments  - Patient initially not on steroids due to severe hyperglycemia - started yesterday as BG much better  - He is markedly hypoxemic and symptomatic with ambulation - possibly due to severe congestion not allowing good flow of the oxygen  - Will need refills on a lot of medications, not sure why he is running out of them as he has plenty of doctors outpatient.  - Still having desat with activity and pulmonary consulted - plan to treat COPD exacerbation more aggressively, ensure good follow up and make sure he has ICS/LABA/LAMA for discharge, Duonebs, albuterol rescue inhaler.  - Added guaifenesin 600 mg bid for congestion, continue Flonase for sinus congestion.

## 2024-11-28 VITALS
RESPIRATION RATE: 18 BRPM | TEMPERATURE: 98 F | BODY MASS INDEX: 26.06 KG/M2 | DIASTOLIC BLOOD PRESSURE: 80 MMHG | WEIGHT: 175.94 LBS | SYSTOLIC BLOOD PRESSURE: 126 MMHG | OXYGEN SATURATION: 95 % | HEIGHT: 69 IN | HEART RATE: 82 BPM

## 2024-11-28 LAB — POCT GLUCOSE: 191 MG/DL (ref 70–110)

## 2024-11-28 PROCEDURE — 27000221 HC OXYGEN, UP TO 24 HOURS

## 2024-11-28 PROCEDURE — 25000242 PHARM REV CODE 250 ALT 637 W/ HCPCS

## 2024-11-28 PROCEDURE — 25000242 PHARM REV CODE 250 ALT 637 W/ HCPCS: Performed by: STUDENT IN AN ORGANIZED HEALTH CARE EDUCATION/TRAINING PROGRAM

## 2024-11-28 PROCEDURE — 94664 DEMO&/EVAL PT USE INHALER: CPT

## 2024-11-28 PROCEDURE — 25000003 PHARM REV CODE 250: Performed by: HOSPITALIST

## 2024-11-28 PROCEDURE — 94640 AIRWAY INHALATION TREATMENT: CPT

## 2024-11-28 PROCEDURE — 25000003 PHARM REV CODE 250

## 2024-11-28 PROCEDURE — 99900035 HC TECH TIME PER 15 MIN (STAT)

## 2024-11-28 PROCEDURE — 63600175 PHARM REV CODE 636 W HCPCS: Performed by: STUDENT IN AN ORGANIZED HEALTH CARE EDUCATION/TRAINING PROGRAM

## 2024-11-28 PROCEDURE — 94761 N-INVAS EAR/PLS OXIMETRY MLT: CPT

## 2024-11-28 PROCEDURE — 99232 SBSQ HOSP IP/OBS MODERATE 35: CPT | Mod: GC,,, | Performed by: INTERNAL MEDICINE

## 2024-11-28 RX ORDER — FLUTICASONE PROPIONATE 50 MCG
2 SPRAY, SUSPENSION (ML) NASAL DAILY
Qty: 18.2 ML | Refills: 0 | Status: SHIPPED | OUTPATIENT
Start: 2024-11-28

## 2024-11-28 RX ADMIN — INSULIN ASPART 10 UNITS: 100 INJECTION, SOLUTION INTRAVENOUS; SUBCUTANEOUS at 08:11

## 2024-11-28 RX ADMIN — ATORVASTATIN CALCIUM 20 MG: 20 TABLET, FILM COATED ORAL at 08:11

## 2024-11-28 RX ADMIN — IPRATROPIUM BROMIDE AND ALBUTEROL SULFATE 3 ML: 2.5; .5 SOLUTION RESPIRATORY (INHALATION) at 09:11

## 2024-11-28 RX ADMIN — PREDNISONE 40 MG: 20 TABLET ORAL at 08:11

## 2024-11-28 RX ADMIN — FLUTICASONE FUROATE AND VILANTEROL TRIFENATATE 1 PUFF: 200; 25 POWDER RESPIRATORY (INHALATION) at 09:11

## 2024-11-28 RX ADMIN — FLUTICASONE PROPIONATE 100 MCG: 50 SPRAY, METERED NASAL at 08:11

## 2024-11-28 RX ADMIN — TIOTROPIUM BROMIDE INHALATION SPRAY 2 PUFF: 3.12 SPRAY, METERED RESPIRATORY (INHALATION) at 09:11

## 2024-11-28 RX ADMIN — GUAIFENESIN 600 MG: 600 TABLET, EXTENDED RELEASE ORAL at 08:11

## 2024-11-28 RX ADMIN — INSULIN GLARGINE 35 UNITS: 100 INJECTION, SOLUTION SUBCUTANEOUS at 08:11

## 2024-11-28 RX ADMIN — INSULIN ASPART 2 UNITS: 100 INJECTION, SOLUTION INTRAVENOUS; SUBCUTANEOUS at 08:11

## 2024-11-28 RX ADMIN — IPRATROPIUM BROMIDE AND ALBUTEROL SULFATE 3 ML: 2.5; .5 SOLUTION RESPIRATORY (INHALATION) at 03:11

## 2024-11-28 RX ADMIN — FERROUS SULFATE TAB 325 MG (65 MG ELEMENTAL FE) 1 EACH: 325 (65 FE) TAB at 08:11

## 2024-11-28 NOTE — SUBJECTIVE & OBJECTIVE
"Interval History:   - improved back to near baseline      Objective:     Vital Signs (Most Recent):  Temp: 97.7 °F (36.5 °C) (11/28/24 0739)  Pulse: 82 (11/28/24 0910)  Resp: 18 (11/28/24 0910)  BP: 126/80 (11/28/24 0739)  SpO2: 95 % (11/28/24 0910) Vital Signs (24h Range):  Temp:  [97.3 °F (36.3 °C)-97.7 °F (36.5 °C)] 97.7 °F (36.5 °C)  Pulse:  [64-91] 82  Resp:  [15-25] 18  SpO2:  [86 %-99 %] 95 %  BP: (126-189)/(80-88) 126/80     Weight: 79.8 kg (175 lb 14.8 oz)  Body mass index is 25.98 kg/m².      Intake/Output Summary (Last 24 hours) at 11/28/2024 1018  Last data filed at 11/28/2024 0023  Gross per 24 hour   Intake 120 ml   Output 300 ml   Net -180 ml        Physical Exam  HENT:      Nose: Congestion present.      Mouth/Throat:      Comments: Edentulous  Cardiovascular:      Rate and Rhythm: Normal rate and regular rhythm.      Pulses: Normal pulses.      Heart sounds: Normal heart sounds. No murmur heard.     No gallop.   Pulmonary:      Effort: Pulmonary effort is normal.      Breath sounds: Decreased air movement present. No stridor. Wheezing present. No rhonchi or rales.   Abdominal:      General: Bowel sounds are normal.      Palpations: Abdomen is soft.   Skin:     Coloration: Skin is not cyanotic.      Nails: There is no clubbing.   Neurological:      General: No focal deficit present.      Mental Status: He is alert and oriented to person, place, and time.           Review of Systems   Constitutional:  Negative for chills and fever.   Respiratory:  Positive for shortness of breath. Negative for cough.         Dyspnea with exertion   Cardiovascular:  Negative for chest pain and palpitations.       Vents:       Lines/Drains/Airways       Peripheral Intravenous Line  Duration                  Peripheral IV - Single Lumen 11/24/24 0857 20 G Right Antecubital 4 days                    Significant Labs:    CBC/Anemia Profile:  No results for input(s): "WBC", "HGB", "HCT", "PLT", "MCV", "RDW", "IRON", " ""FERRITIN", "RETIC", "FOLATE", "[id number]", "OCCULTBLOOD" in the last 48 hours.     Chemistries:  No results for input(s): "NA", "K", "CL", "CO2", "BUN", "CREATININE", "CALCIUM", "ALBUMIN", "PROT", "BILITOT", "ALKPHOS", "ALT", "AST", "GLUCOSE", "MG", "PHOS" in the last 48 hours.    All pertinent labs within the past 24 hours have been reviewed.    Significant Imaging:  I have reviewed all pertinent imaging results/findings within the past 24 hours.  "

## 2024-11-28 NOTE — NURSING
10:39 AM  D/C pending personal transportation. ETA after 11a. Instructions reviewed. Verbalized understanding. Medications delivered.     11:19 AM  Pt eager for discharge. Does not want to wait for daughter. Pt on 3L NC via portable device (home oxygen).NADN. IV and bio beats removed.

## 2024-11-28 NOTE — ASSESSMENT & PLAN NOTE
S/p radiation tx with no progression as of September and now on surveillance.   CT non-con without significant change.

## 2024-11-28 NOTE — PROGRESS NOTES
[facility] ([address])  Pulmonology  Progress Note    Patient Name: Lan Robles  MRN: [id number]  Admission Date: 11/24/2024  Hospital Length of Stay: 3 days  Code Status: Full Code  Attending Provider: Darcy Carpenter MD  Primary Care Provider: Matthew Heath III, MD   Principal Problem: COPD with acute exacerbation    Subjective:     HPI:  69M with COPD/emphysema, R lung adenoca (treated with radiation), prior prostate ca, thyroid nodule presenting for acute on chronic hypoxemic RF.     Had covid in September and then felt back to baseline until this weekend; on Saturday night noted that he was more breathless than usual on home O2. Uses 2L on full-size concentrator and 3L on portable. Oxygenation suboptimal, says that his O2 sat per pulse ox is usually around 84 until he recovers to low 90s. Has nebulizer but out of nebs, out of anoro, almost out of albuterol. Chronic sinus congestion which is worse, asserts having chest congestion and sore throat. O2 on admission in low 80s, with low 80s on walk test. Flu/covid negative. Blood sugar in 500s. CXR mildly improved from 9/2024 (when he had covid) but overall with increased opacities compared to earlier this year. Prior smoker, quit in 2022. PFTs in 2022 showing significant obstruction with severe ventilatory impairment. By the prior criteria, his bronchodilator challenge did not meet criteria for reversibility, however these parameters were redefined later in 2022 and now those values do meet criteria for bronchodilator response.     Cancer history (thanks to Dr. Vasquez and Dr. Bro): Stage IA3 (cT1c cN0 M0) RLL NSCLC (adeno) diagnosed on robotic bronch w/ EBUS 8/12/22. Biopsy of a RUL nodule, stations 7 and 11L/R nodes were negative for malignancy. PET/CT 9/6/22 demonstrated mild uptake in RLL nodule only. He completed definitive SBRT 55 Gy in 5 fx on 9/27/22.  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 Bro. Biopsy of an FDG avid RLL nodule was negative for malignancy; however, a 2.0 cm GGO in the RUL revealed lepidic adenocarcinoma. He completed definitive SBRT 50 Gy in 4 fx on 3/4/24.  History significant for high risk prostate cancer s/p EBRT 70 Gy in 28 fx +ADT with Dr. Villalobos completed 11/2020. Last visit with Dr. Vasquez 9/2024 with stable imaging findings; transitioned to q6mo surveillance.    Overview/Hospital Course:  Treated with q4H duonebs, flonase, guaifenesin. Sugars controlled. Pt asserts feeling improved, though not to baseline.    Interval History:   - improved back to near baseline      Objective:     Vital Signs (Most Recent):  Temp: 97.7 °F (36.5 °C) (11/28/24 0739)  Pulse: 82 (11/28/24 0910)  Resp: 18 (11/28/24 0910)  BP: 126/80 (11/28/24 0739)  SpO2: 95 % (11/28/24 0910) Vital Signs (24h Range):  Temp:  [97.3 °F (36.3 °C)-97.7 °F (36.5 °C)] 97.7 °F (36.5 °C)  Pulse:  [64-91] 82  Resp:  [15-25] 18  SpO2:  [86 %-99 %] 95 %  BP: (126-189)/(80-88) 126/80     Weight: 79.8 kg (175 lb 14.8 oz)  Body mass index is 25.98 kg/m².      Intake/Output Summary (Last 24 hours) at 11/28/2024 1018  Last data filed at 11/28/2024 0023  Gross per 24 hour   Intake 120 ml   Output 300 ml   Net -180 ml        Physical Exam  HENT:      Nose: Congestion present.      Mouth/Throat:      Comments: Edentulous  Cardiovascular:      Rate and Rhythm: Normal rate and regular rhythm.      Pulses: Normal pulses.      Heart sounds: Normal heart sounds. No murmur heard.     No gallop.   Pulmonary:      Effort: Pulmonary effort is normal.      Breath sounds: Decreased air movement present. No stridor. Wheezing present. No rhonchi or rales.   Abdominal:      General: Bowel sounds are normal.      Palpations: Abdomen is soft.   Skin:     Coloration: Skin is not cyanotic.      Nails: There is no clubbing.   Neurological:      General: No focal deficit present.      Mental Status: He is alert and  "oriented to person, place, and time.           Review of Systems   Constitutional:  Negative for chills and fever.   Respiratory:  Positive for shortness of breath. Negative for cough.         Dyspnea with exertion   Cardiovascular:  Negative for chest pain and palpitations.       Vents:       Lines/Drains/Airways       Peripheral Intravenous Line  Duration                  Peripheral IV - Single Lumen 11/24/24 0857 20 G Right Antecubital 4 days                    Significant Labs:    CBC/Anemia Profile:  No results for input(s): "WBC", "HGB", "HCT", "PLT", "MCV", "RDW", "IRON", "FERRITIN", "RETIC", "FOLATE", "[id number]", "OCCULTBLOOD" in the last 48 hours.     Chemistries:  No results for input(s): "NA", "K", "CL", "CO2", "BUN", "CREATININE", "CALCIUM", "ALBUMIN", "PROT", "BILITOT", "ALKPHOS", "ALT", "AST", "GLUCOSE", "MG", "PHOS" in the last 48 hours.    All pertinent labs within the past 24 hours have been reviewed.    Significant Imaging:  I have reviewed all pertinent imaging results/findings within the past 24 hours.  Assessment/Plan:     Pulmonary  Acute on chronic hypoxic respiratory failure  Likely a bit chronically under-oxygenated on portable concentrator and while ambulating. Likely COPD exacerbation improving. All infectious studies negative, CT chest without significant change, nasal congestion improved with flonase.   - keep O2 sats around 90%; have discussed with him  - have communicated with Pulm clinic to book him in for Gen Pulm care  - LABA/LAMA/ICS, duonebs on d/c  - prednisone 40mg for 5 days    Oncology  Primary malignant neoplasm of right upper lobe of lung  S/p radiation tx with no progression as of September and now on surveillance.   CT non-con without significant change.                 Black Iniguez MD  Pulmonology  [facility] ([address])      "

## 2024-11-28 NOTE — ASSESSMENT & PLAN NOTE
Likely a bit chronically under-oxygenated on portable concentrator and while ambulating. Likely COPD exacerbation improving. All infectious studies negative, CT chest without significant change, nasal congestion improved with flonase.   - keep O2 sats around 90%; have discussed with him  - have communicated with Pulm clinic to book him in for Gen Pulm care  - LABA/LAMA/ICS, duonebs on d/c  - prednisone 40mg for 5 days

## 2024-11-28 NOTE — PLAN OF CARE
Case Management Final Discharge Note      Discharge Disposition: Home with Family     New DME ordered / company name: NA. Patient has home O2    Relevant SDOH / Transition of Care Barriers:  None     Primary Caretaker and contact information: Self and BASIA HAMMONDS (Daughter) [phone]    Scheduled followup appointment: Pulm team is scheduling     Referrals placed: NA    Transportation: daughter     Patient and family educated on discharge services and updated on DC plan. Patient clear to discharge from Case Management Perspective.     [facility] ([address])  Discharge Final Note    Primary Care Provider: Matthew Heath III, MD    Expected Discharge Date: 11/28/2024    Final Discharge Note (most recent)       Final Note - 11/28/24 0903          Final Note    Assessment Type Final Discharge Note (P)      Anticipated Discharge Disposition Home or Self Care (P)      Hospital Resources/Appts/Education Provided Provided patient/caregiver with written discharge plan information;Appointments scheduled and added to AVS (P)         Post-Acute Status    Post-Acute Authorization Other (P)      Other Status No Post-Acute Service Needs (P)      Discharge Delays None known at this time (P)                    Contact Info       Matthew Heath III, MD   Specialty: Internal Medicine   Relationship: PCP - General    [address]  [address]  [facility] [address]   Phone: [phone]       Next Steps: Follow up    Instructions: Follow up in 1-2 weeks    [facility] PULMONARY [facility]ICINE   Specialty: Pulmonology    [address]  [facility] [address]   Phone: [phone]       Next Steps: Follow up    Instructions: Clinic will call with appointment.

## 2024-11-28 NOTE — PLAN OF CARE
Patient had uneventful shift, ambulated to the bathroom on his own but did not use his oxygen to get up to bathroom and did desat when he ambulated. MD aware of this. Patient received insulin as ordered  Problem: Adult Inpatient Plan of Care  Goal: Plan of Care Review  Outcome: Progressing  Goal: Patient-Specific Goal (Individualized)  Outcome: Progressing  Goal: Absence of Hospital-Acquired Illness or Injury  Outcome: Progressing  Goal: Optimal Comfort and Wellbeing  Outcome: Progressing  Goal: Readiness for Transition of Care  Outcome: Progressing     Problem: Diabetes Comorbidity  Goal: Blood Glucose Level Within Targeted Range  Outcome: Progressing     Problem: COPD (Chronic Obstructive Pulmonary Disease)  Goal: Optimal Chronic Illness Coping  Outcome: Progressing  Goal: Optimal Level of Functional [place]  Outcome: Progressing  Goal: Absence of Infection Signs and Symptoms  Outcome: Progressing  Goal: Improved Oral Intake  Outcome: Progressing  Goal: Effective Oxygenation and Ventilation  Outcome: Progressing

## 2024-11-29 ENCOUNTER — TELEPHONE (OUTPATIENT)
Dept: PULMONOLOGY | Facility: CLINIC | Age: 69
End: 2024-11-29
Payer: MEDICARE

## 2024-11-29 LAB
BACTERIA SPEC AEROBE CULT: NORMAL
BACTERIA SPEC AEROBE CULT: NORMAL
GRAM STN SPEC: NORMAL
POCT GLUCOSE: 222 MG/DL (ref 70–110)

## 2024-11-29 NOTE — TELEPHONE ENCOUNTER
----- Message from Black Iniguez sent at 11/28/2024 10:15 AM CST -----  Regarding: post-hospital visit into Pulm clinic?  Jax Ms. Patel!    I was consulted on this patient during his COPD exacerbation hospitalization here at [facility] and want to organize post-discharge follow-up for him.   This patient had seen Dr. Bro for robot for diagnosis of adenocarcinoma, but he needs a general pulmonologist to manage his COPD and wants to stay at [facility].   Can he be booked in with someone else? I think he would be ok for fellow's clinic, he's pretty straight-forward. He's a rhianna kimberly.    Thank you! Happy thanksgiving.

## 2024-11-29 NOTE — TELEPHONE ENCOUNTER
I recieved this message from Dr Iniguez about a hospital follow up after Dr Bro's Ebus procedure for Mr Robles. He will come 2-5-25 to see Dr Archana Camilo.I will call Mr Robles and mail this slip to his home.Amanda Kraus

## 2024-12-19 DIAGNOSIS — J43.2 CENTRILOBULAR EMPHYSEMA: ICD-10-CM

## 2024-12-19 RX ORDER — IPRATROPIUM BROMIDE AND ALBUTEROL SULFATE 2.5; .5 MG/3ML; MG/3ML
3 SOLUTION RESPIRATORY (INHALATION) EVERY 4 HOURS PRN
Qty: 90 ML | Refills: 0 | Status: CANCELLED | OUTPATIENT
Start: 2024-12-19

## 2024-12-19 RX ORDER — ALBUTEROL SULFATE 90 UG/1
2 INHALANT RESPIRATORY (INHALATION) EVERY 6 HOURS PRN
Qty: 8.5 G | Refills: 0 | Status: CANCELLED | OUTPATIENT
Start: 2024-12-19

## 2024-12-19 RX ORDER — FLUTICASONE FUROATE, UMECLIDINIUM BROMIDE AND VILANTEROL TRIFENATATE 100; 62.5; 25 UG/1; UG/1; UG/1
1 POWDER RESPIRATORY (INHALATION) DAILY
Qty: 60 EACH | Refills: 0 | Status: CANCELLED | OUTPATIENT
Start: 2024-12-19

## 2024-12-19 RX ORDER — QUETIAPINE FUMARATE 50 MG/1
50 TABLET, FILM COATED ORAL NIGHTLY
Qty: 30 TABLET | Refills: 0 | Status: CANCELLED | OUTPATIENT
Start: 2024-12-19 | End: 2025-01-18

## 2024-12-30 RX ORDER — IPRATROPIUM BROMIDE AND ALBUTEROL SULFATE 2.5; .5 MG/3ML; MG/3ML
3 SOLUTION RESPIRATORY (INHALATION) EVERY 4 HOURS PRN
Qty: 90 ML | Refills: 0 | OUTPATIENT
Start: 2024-12-30

## 2024-12-30 RX ORDER — ALBUTEROL SULFATE 90 UG/1
2 INHALANT RESPIRATORY (INHALATION) EVERY 6 HOURS PRN
Qty: 8.5 G | Refills: 0 | OUTPATIENT
Start: 2024-12-30

## 2024-12-30 RX ORDER — QUETIAPINE FUMARATE 50 MG/1
50 TABLET, FILM COATED ORAL NIGHTLY
Qty: 30 TABLET | Refills: 0 | OUTPATIENT
Start: 2024-12-30 | End: 2025-01-29

## 2024-12-30 RX ORDER — FLUTICASONE FUROATE, UMECLIDINIUM BROMIDE AND VILANTEROL TRIFENATATE 100; 62.5; 25 UG/1; UG/1; UG/1
1 POWDER RESPIRATORY (INHALATION) DAILY
Qty: 60 EACH | Refills: 0 | OUTPATIENT
Start: 2024-12-30

## 2025-02-05 ENCOUNTER — OFFICE VISIT (OUTPATIENT)
Dept: PULMONOLOGY | Facility: CLINIC | Age: 70
End: 2025-02-05
Payer: MEDICARE

## 2025-02-05 VITALS
SYSTOLIC BLOOD PRESSURE: 142 MMHG | WEIGHT: 188.94 LBS | HEART RATE: 99 BPM | HEIGHT: 70 IN | BODY MASS INDEX: 27.05 KG/M2 | DIASTOLIC BLOOD PRESSURE: 78 MMHG | OXYGEN SATURATION: 93 %

## 2025-02-05 DIAGNOSIS — C34.11 MALIGNANT NEOPLASM OF UPPER LOBE, RIGHT BRONCHUS OR LUNG: ICD-10-CM

## 2025-02-05 DIAGNOSIS — J96.21 ACUTE ON CHRONIC HYPOXIC RESPIRATORY FAILURE: ICD-10-CM

## 2025-02-05 DIAGNOSIS — F17.200 SMOKER: ICD-10-CM

## 2025-02-05 DIAGNOSIS — J43.2 CENTRILOBULAR EMPHYSEMA: ICD-10-CM

## 2025-02-05 DIAGNOSIS — J44.9 CHRONIC OBSTRUCTIVE PULMONARY DISEASE, UNSPECIFIED COPD TYPE: Primary | ICD-10-CM

## 2025-02-05 DIAGNOSIS — J96.11 CHRONIC RESPIRATORY FAILURE WITH HYPOXIA: ICD-10-CM

## 2025-02-05 PROBLEM — J44.1 COPD WITH ACUTE EXACERBATION: Status: RESOLVED | Noted: 2024-11-24 | Resolved: 2025-02-05

## 2025-02-05 PROCEDURE — 99213 OFFICE O/P EST LOW 20 MIN: CPT | Mod: S$GLB,,, | Performed by: INTERNAL MEDICINE

## 2025-02-05 PROCEDURE — 3077F SYST BP >= 140 MM HG: CPT | Mod: CPTII,S$GLB,, | Performed by: INTERNAL MEDICINE

## 2025-02-05 PROCEDURE — 1159F MED LIST DOCD IN RCRD: CPT | Mod: CPTII,S$GLB,, | Performed by: INTERNAL MEDICINE

## 2025-02-05 PROCEDURE — 3078F DIAST BP <80 MM HG: CPT | Mod: CPTII,S$GLB,, | Performed by: INTERNAL MEDICINE

## 2025-02-05 PROCEDURE — 1101F PT FALLS ASSESS-DOCD LE1/YR: CPT | Mod: CPTII,S$GLB,, | Performed by: INTERNAL MEDICINE

## 2025-02-05 PROCEDURE — 3288F FALL RISK ASSESSMENT DOCD: CPT | Mod: CPTII,S$GLB,, | Performed by: INTERNAL MEDICINE

## 2025-02-05 PROCEDURE — 99999 PR PBB SHADOW E&M-EST. PATIENT-LVL III: CPT | Mod: PBBFAC,,, | Performed by: INTERNAL MEDICINE

## 2025-02-05 PROCEDURE — 3008F BODY MASS INDEX DOCD: CPT | Mod: CPTII,S$GLB,, | Performed by: INTERNAL MEDICINE

## 2025-02-05 RX ORDER — DULAGLUTIDE 0.75 MG/.5ML
0.75 INJECTION, SOLUTION SUBCUTANEOUS
COMMUNITY
Start: 2024-12-04

## 2025-02-05 RX ORDER — FLUTICASONE PROPIONATE 50 MCG
2 SPRAY, SUSPENSION (ML) NASAL DAILY
Qty: 18.2 ML | Refills: 0 | Status: SHIPPED | OUTPATIENT
Start: 2025-02-05

## 2025-02-05 RX ORDER — PREDNISONE 20 MG/1
40 TABLET ORAL DAILY
Qty: 10 TABLET | Refills: 0 | Status: SHIPPED | OUTPATIENT
Start: 2025-02-05 | End: 2025-02-10

## 2025-02-05 RX ORDER — TIOTROPIUM BROMIDE INHALATION SPRAY 3.12 UG/1
2 SPRAY, METERED RESPIRATORY (INHALATION) DAILY
Qty: 4 G | Refills: 11 | Status: SHIPPED | OUTPATIENT
Start: 2025-02-05

## 2025-02-05 RX ORDER — FLUTICASONE PROPIONATE AND SALMETEROL 250; 50 UG/1; UG/1
1 POWDER RESPIRATORY (INHALATION) 2 TIMES DAILY
Qty: 60 EACH | Refills: 6 | Status: SHIPPED | OUTPATIENT
Start: 2025-02-05 | End: 2026-02-05

## 2025-02-05 NOTE — PROGRESS NOTES
"Subjective:     Reason for visit: COPD    Patient ID:  Lan Robles is a 69 y.o. male    Interval History:  Mr. Robles has been followed in our clinic previously but is new to me.  He was previously seen by Dr. Bro and had EBUS with biopsy in 2022 that did not show cancer in that biopsy however he did have PET avidity and was referred to Oncology and had definitive radiation.  Repeat CT scans have shown stable nodules with no increase in the current nodules.  He was admitted in November with a severe COPD exacerbation but since discharge has been doing well.  He is on his 3 L of oxygen and his trilogy however he says that it is much too expensive he paid for this month will not be able to for the next months.  Continued use of his nebulizers and albuterol as needed but he has not needed them very often.        Objective:     Vitals:    02/05/25 1034   BP: (!) 142/78   BP Location: Right arm   Patient Position: Sitting   Pulse: 99   SpO2: (!) 93%  Comment: 3.0   Weight: 85.7 kg (188 lb 15 oz)   Height: 5' 10" (1.778 m)         Physical Exam  Constitutional:       General: He is not in acute distress.     Appearance: He is not diaphoretic.   HENT:      Head: Normocephalic and atraumatic.      Right Ear: External ear normal.      Left Ear: External ear normal.   Eyes:      Conjunctiva/sclera: Conjunctivae normal.      Pupils: Pupils are equal, round, and reactive to light.   Neck:      Trachea: No tracheal deviation.   Cardiovascular:      Rate and Rhythm: Normal rate and regular rhythm.      Heart sounds: Normal heart sounds. No murmur heard.  Pulmonary:      Effort: Pulmonary effort is normal. No respiratory distress.      Breath sounds: Normal breath sounds. No stridor. No wheezing or rales.   Abdominal:      General: Bowel sounds are normal. There is no distension.      Palpations: Abdomen is soft.      Tenderness: There is no abdominal tenderness.   Musculoskeletal:         General: Normal range of " motion.      Cervical back: Normal range of motion and neck supple.   Skin:     General: Skin is warm and dry.      Findings: No erythema.   Neurological:      Mental Status: He is alert and oriented to person, place, and time.      Gait: Gait is intact.   Psychiatric:         Mood and Affect: Mood and affect normal.         Cognition and Memory: Memory normal.         Judgment: Judgment normal.          Personal Diagnostic Review and Interpretation  CTA from recent hospitalization reviewed      Pertinent Studies Reviewed & Interpreted:     Pulmonary Function Tests:   Previous PFTs with significant obstruct      6 Minute Walk Tests:   Most recent walk test while hospitalized shows ongoing need for 3 L of oxygen        Assessment & Plan:       Problem List Items Addressed This Visit          Pulmonary    Centrilobular emphysema    Overview     Was prescribed trilogy however it is way too expensive so swapping to fluticasone/salmeterol in combination with Spiriva  Continue nebulizer treatments as needed as well as albuterol.    Prescribed prednisone to have at home for emergencies  We will need repeat CT scans per his oncologist but at least yearly         Acute on chronic hypoxic respiratory failure    Current Assessment & Plan     Has required oxygen for several years and is on 3 L at home and his concentrator.  He is having some ongoing issues with congestion so we discussed adding humidifier to improve his nasal congestion and dryness.  He is also going to add nasal saline rinses and Flonase            Other    Smoker    Overview     Motivated to quit         Current Assessment & Plan     Quit in 2022          Other Visit Diagnoses       Chronic obstructive pulmonary disease, unspecified COPD type    -  Primary    Relevant Medications    fluticasone-salmeterol diskus inhaler 250-50 mcg    tiotropium bromide (SPIRIVA RESPIMAT) 2.5 mcg/actuation inhaler    predniSONE (DELTASONE) 20 MG tablet    Chronic respiratory  failure with hypoxia        Malignant neoplasm of upper lobe, right bronchus or lung                 RETURN TO CLINIC IN 6 MONTHS       Portions of the record may have been created with voice-recognition software. Occasional wrong-word or sound-a-like substitutions may have occurred due to the inherent limitations of voice-recognition software. Read the chart carefully and recognize, using context, where substitutions have occurred.    Archana Camilo M.D.  Pulmonary/Critical Care

## 2025-02-05 NOTE — ASSESSMENT & PLAN NOTE
Has required oxygen for several years and is on 3 L at home and his concentrator.  He is having some ongoing issues with congestion so we discussed adding humidifier to improve his nasal congestion and dryness.  He is also going to add nasal saline rinses and Flonase

## 2025-02-28 ENCOUNTER — TELEPHONE (OUTPATIENT)
Dept: UROLOGY | Facility: CLINIC | Age: 70
End: 2025-02-28
Payer: MEDICARE

## 2025-03-03 ENCOUNTER — HOSPITAL ENCOUNTER (OUTPATIENT)
Dept: RADIOLOGY | Facility: OTHER | Age: 70
Discharge: HOME | End: 2025-03-03
Attending: UROLOGY
Payer: MEDICARE

## 2025-03-03 DIAGNOSIS — N28.89 RENAL MASS: ICD-10-CM

## 2025-03-03 PROCEDURE — 25500020 PHARM REV CODE 255: Performed by: UROLOGY

## 2025-03-03 PROCEDURE — 74178 CT ABD&PLV WO CNTR FLWD CNTR: CPT | Mod: TC

## 2025-03-03 PROCEDURE — 74178 CT ABD&PLV WO CNTR FLWD CNTR: CPT | Mod: 26,,, | Performed by: RADIOLOGY

## 2025-03-03 RX ADMIN — IOHEXOL 90 ML: 350 INJECTION, SOLUTION INTRAVENOUS at 12:03

## 2025-03-05 ENCOUNTER — RESULTS FOLLOW-UP (OUTPATIENT)
Dept: UROLOGY | Facility: CLINIC | Age: 70
End: 2025-03-05
Payer: MEDICARE

## 2025-03-05 NOTE — PROGRESS NOTES
Pt was instructed to keep appointment to discuss results or to make an appointment if one is not already scheduled

## 2025-03-05 NOTE — PROGRESS NOTES
Pt was instructed to keep appointment with Dr Vasquez, radiation oncology, to discuss results or to make an appointment if one is not already scheduled

## 2025-03-10 NOTE — PROGRESS NOTES
Audio Only Telehealth Visit     The patient location is: home  The chief complaint leading to consultation is: renal mass follow up  Visit type: Virtual visit with audio only (telephone)  Total time spent in medical discussion with patient: 11 min minutes  Total time spent on date of the encounter:20 minutes       The reason for the audio only service rather than synchronous audio and video virtual visit was related to technical difficulties or patient preference/necessity.       Each patient to whom I provide medical services by telemedicine is:  (1) informed of the relationship between the physician and patient and the respective role of any other health care provider with respect to management of the patient; and (2) notified that they may decline to receive medical services by telemedicine and may withdraw from such care at any time. Patient verbally consented to receive this service via voice-only telephone call.       HPI: 69 male   Active surveillance for renal mass    CT ab 3/2025  Impression:     At least 1 spiculated right lower lobe pulmonary nodule is slowly enlarging compared to December 2023. Underlying malignancy should be considered.     Enhancing mass upper pole right kidney is stable in size.  Primary renal neoplasm should be considered.  No new suspicious renal mass.        Electronically signed by:Lainey Ornelas  Date:                                            03/03/2025  Time:                                           13:46     Assessment and plan:      Prostate cancer   Sina 9 [id number]; psa 123 OSCAR sp ADT*2 years and XRT  See Chey Saini NP 12 months with psa and T     Renal mass  Stable on active surveillance   We discussed the natural history of small renal masses, the risk of malignancy and disease progression, and the small risk of mortality.  We discussed the risks and benefits of watchful waiting, biopsy, partial and total nephrectomy.  We discussed the benefits of renal preservation  denies nausea vomiting or diarrhea at this time. AOX4, pupils equal and reactive to light. has a steady gait. awaiting CTr when possible.  We discussed percutaneous, laparoscopic and robotic approaches.  We discussed the management of positive surgical margins.  I answered all questions.     yearly CT scan renal protocol      Lung ca  Stage IA3 (cT1c cN0 M0) RLL NSCLC; with enlarging lesion  Follow up with rad onc, 3/26                        This service was not originating from a related E/M service provided within the previous 7 days nor will  to an E/M service or procedure within the next 24 hours or my soonest available appointment.  Prevailing standard of care was able to be met in this audio-only visit.

## 2025-03-11 ENCOUNTER — OFFICE VISIT (OUTPATIENT)
Dept: UROLOGY | Facility: CLINIC | Age: 70
End: 2025-03-11
Payer: MEDICARE

## 2025-03-11 ENCOUNTER — TELEPHONE (OUTPATIENT)
Dept: UROLOGY | Facility: CLINIC | Age: 70
End: 2025-03-11

## 2025-03-11 DIAGNOSIS — C61 PROSTATE CANCER: ICD-10-CM

## 2025-03-11 DIAGNOSIS — N28.89 RENAL MASS: Primary | ICD-10-CM

## 2025-03-11 PROCEDURE — 98013 SYNCH AUDIO-ONLY EST LOW 20: CPT | Mod: 93,,, | Performed by: UROLOGY

## 2025-03-11 NOTE — TELEPHONE ENCOUNTER
----- Message from ZAINAB Worthington sent at 3/11/2025 10:55 AM CDT -----    ----- Message -----  From: Jeff Avalos MD  Sent: 3/11/2025  10:51 AM CDT  To: [facility]bie [facility] Staff    Please make patient arrived so that I can chartBMP, PSA, CT renal protocol in 1 yr then see [place]

## 2025-03-26 ENCOUNTER — HOSPITAL ENCOUNTER (OUTPATIENT)
Dept: RADIOLOGY | Facility: HOSPITAL | Age: 70
Discharge: HOME | End: 2025-03-26
Attending: RADIOLOGY
Payer: MEDICARE

## 2025-03-26 ENCOUNTER — OFFICE VISIT (OUTPATIENT)
Dept: RADIATION ONCOLOGY | Facility: CLINIC | Age: 70
End: 2025-03-26
Payer: MEDICARE

## 2025-03-26 VITALS
DIASTOLIC BLOOD PRESSURE: 96 MMHG | HEART RATE: 89 BPM | BODY MASS INDEX: 26.89 KG/M2 | OXYGEN SATURATION: 91 % | RESPIRATION RATE: 20 BRPM | HEIGHT: 70 IN | WEIGHT: 187.81 LBS | SYSTOLIC BLOOD PRESSURE: 149 MMHG | TEMPERATURE: 98 F

## 2025-03-26 DIAGNOSIS — Z85.118 PERSONAL HISTORY OF LUNG CANCER: ICD-10-CM

## 2025-03-26 DIAGNOSIS — Z85.118 PERSONAL HISTORY OF LUNG CANCER: Primary | ICD-10-CM

## 2025-03-26 PROCEDURE — 99999 PR PBB SHADOW E&M-EST. PATIENT-LVL IV: CPT | Mod: PBBFAC,,, | Performed by: RADIOLOGY

## 2025-03-26 PROCEDURE — 71250 CT THORAX DX C-: CPT | Mod: TC

## 2025-03-26 NOTE — PROGRESS NOTES
3/26/2025    Radiation Oncology Follow-Up Visit      Prior Radiation History:   Course 1:  Treatment Site  Treatment Fields and Beam Energy  Dose/Fx (Gy)  #Fx  Total Dose (Gy)  Start Date  End Date    Prostate and pelvic nodes  VMAT with 6 MV photons  2.5  28 / 28  70  10/1/2020  11/10/2020      Course 2:    Site  Technique  Energy  Dose/Fx (Gy)  #Fx  Total Dose (Gy)  Start Date  End Date  Elapsed Days    RLL Lung  SBRT  6X  11  5 / 5  55  9/21/2022 9/27/2022  6      Course 3:    Site  Technique  Energy  Dose/Fx (Gy)  #Fx  Total Dose (Gy)  Start Date  End Date  Elapsed Days    RUL Lung  SBRT  6X  12.5  4 / 4  50  2/26/2024  3/4/2024  7      Is the patient female between ages 15-55:  no    Does the patient have a CIED:  no      Assessment   This is a 69 y.o. 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 Biopsy of a RUL nodule, stations 7 and 11L/R nodes were negative for malignancy. PET/CT 9/6/22 demonstrated mild uptake in RLL nodule only. He completed definitive SBRT 55 Gy in 5 fx on 9/27/22.  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 Bro. Biopsy of an FDG avid RLL nodule was negative for malignancy; however, a 2.0 cm GGO in the RUL revealed lepidic adenocarcinoma. He completed definitive SBRT 50 Gy in 4 fx on 3/4/24.         History significant for high risk prostate cancer s/p EBRT 70 Gy in 28 fx +ADT with Dr. Villalobos completed 11/2020.     CT Chest today 3/26/25 demonstrates overall stability of treated lesions in RUL/RLL as well as other scattered nodules. A nodule in the most inferior/medial RLL now with bilobed appearance. Since this is the first scan with much of a change and given his overall health picture, we discussed continued observation, which I think is reasonable. If this area continues to change in the future it will likely be difficult to biopsy given location above  "diaphragm. We would discuss empiric SBRT at that time if concern for malignancy.           Plan   1) I will see him back in 6 months with CT Chest prior for re-staging.            CHIEF COMPLAINT: F/U after SBRT for lung cancer    HPI/Focused ROS:  Since last visit, he was admitted in 2024 for COPD exacerbation. He reports feeling well and back to his baseline after discharge. On supplemental O2 2L. Denies chest pain or recent cough, fevers, or weight loss.       Past Medical History:   Diagnosis Date    Adenocarcinoma of lung, right 2022    COPD (chronic obstructive pulmonary disease)     Diabetes mellitus     Prostate cancer 2020       Past Surgical History:   Procedure Laterality Date    ROBOTIC BRONCHOSCOPY N/A 2022    Procedure: ROBOTIC BRONCHOSCOPY;  Surgeon: Laney Bro MD;  [facility];  Service: Pulmonary;  Laterality: N/A;    ROBOTIC BRONCHOSCOPY N/A 2024    Procedure: ROBOTIC BRONCHOSCOPY;  Surgeon: Laney Bro MD;  [facility];  Service: Pulmonary;  Laterality: N/A;       Social History     Tobacco Use    Smoking status: Former     Current packs/day: 0.00     Types: Cigarettes     Start date:      Quit date: 2022     Years since quittin.9    Smokeless tobacco: Never   Substance Use Topics    Alcohol use: Yes    Drug use: Never       Cancer-related family history includes Cancer in his father. There is no history of Melanoma.    Current Outpatient Medications on File Prior to Visit   Medication Sig Dispense Refill    albuterol (PROVENTIL/VENTOLIN HFA) 90 mcg/actuation inhaler Inhale 2 puffs into the lungs every 6 (six) hours as needed for Wheezing (Rescue inhaler). 8.5 g 0    albuterol-ipratropium (DUO-NEB) 2.5 mg-0.5 mg/3 mL nebulizer solution Take 3 mLs by nebulization every 4 (four) hours as needed for Wheezing. Rescue 90 mL 0    BD BRENNA 2ND GEN PEN NEEDLE 32 gauge x 32" Ndle USE TO INJECT INSULIN UNDER THE SKIN EVERY DAY      ferrous " "sulfate (IRON, FERROUS SULFATE,) 325 mg (65 mg iron) Tab tablet Take 1 tablet (325 mg total) by mouth every other day. 45 tablet 1    fluticasone propionate (FLONASE) 50 mcg/actuation nasal spray 2 sprays (100 mcg total) by Each Nostril route once daily. 18.2 mL 0    fluticasone-salmeterol diskus inhaler 250-50 mcg Inhale 1 puff into the lungs 2 (two) times daily. Controller 60 each 6    metFORMIN (GLUCOPHAGE) 1000 MG tablet Take 1,000 mg by mouth 2 (two) times daily with meals.      tiotropium bromide (SPIRIVA RESPIMAT) 2.5 mcg/actuation inhaler Inhale 2 puffs into the lungs Daily. Controller 4 g 11    TRESIBA FLEXTOUCH U-100 100 unit/mL (3 mL) InPn Inject 36 Units into the skin every evening.      TRULICITY 0.75 mg/0.5 mL pen injector Inject 0.75 mg into the skin every 7 days.      QUEtiapine (SEROQUEL) 50 MG tablet Take 1 tablet (50 mg total) by mouth every evening. 30 tablet 0    simvastatin (ZOCOR) 40 MG tablet Take 1 tablet by mouth every evening.      tamsulosin (FLOMAX) 0.4 mg Cap Take 1 capsule by mouth once daily.       No current facility-administered medications on file prior to visit.       Review of patient's allergies indicates:  No Known Allergies      Vital Signs: BP (!) 149/96   Pulse 89   Temp 98.1 °F (36.7 °C)   Resp 20   Ht 5' 10" (1.778 m)   Wt 85.2 kg (187 lb 13.3 oz)   SpO2 (!) 91%   BMI 26.95 kg/m²     ECOG Performance Status: 2    Physical Exam  Constitutional:       Appearance: Normal appearance.   HENT:      Head: Normocephalic and atraumatic.   Eyes:      General: No scleral icterus.     Extraocular Movements: Extraocular movements intact.   Pulmonary:      Effort: No accessory muscle usage or respiratory distress.      Comments: On supplemental O2 2L  Musculoskeletal:      Cervical back: Normal range of motion.   Neurological:      Mental Status: He is alert and oriented to person, place, and time.   Psychiatric:         Mood and Affect: Mood and affect normal.         Judgment: " Judgment normal.          Labs:    Imaging: I have personally reviewed the patient's available images and reports and summarized pertinent findings above in HPI.     Pathology: No new path

## 2025-08-04 ENCOUNTER — OFFICE VISIT (OUTPATIENT)
Dept: SURGERY | Facility: CLINIC | Age: 70
End: 2025-08-04
Attending: SPECIALIST
Payer: MEDICARE

## 2025-08-04 VITALS
HEART RATE: 88 BPM | WEIGHT: 174 LBS | SYSTOLIC BLOOD PRESSURE: 156 MMHG | OXYGEN SATURATION: 95 % | BODY MASS INDEX: 24.91 KG/M2 | DIASTOLIC BLOOD PRESSURE: 78 MMHG | HEIGHT: 70 IN

## 2025-08-04 DIAGNOSIS — L02.11 NECK ABSCESS: Primary | ICD-10-CM

## 2025-08-04 PROCEDURE — 99499 UNLISTED E&M SERVICE: CPT | Mod: S$GLB,,, | Performed by: SPECIALIST

## 2025-08-04 PROCEDURE — 10060 I&D ABSCESS SIMPLE/SINGLE: CPT | Mod: S$GLB,,, | Performed by: SPECIALIST

## 2025-08-04 PROCEDURE — 99999 PR PBB SHADOW E&M-EST. PATIENT-LVL III: CPT | Mod: PBBFAC,,, | Performed by: SPECIALIST

## 2025-08-04 NOTE — PROGRESS NOTES
70-year-old male with infected sebaceous cyst right neck  No fever, chills  P.m. X-non-small cell carcinoma of the lung  On home oxygen    Subjective   No fever, chills    PE   3 cm cystic mass, tender erythematous right upper neck above SCM    Impression/plan   Infected sebaceous cyst   I and D

## 2025-08-05 NOTE — PROCEDURES
"Incision & Drainage neck abscess    Date/Time: 8/4/2025 11:00 AM    Performed by: Mauro Goins Jr., MD  Authorized by: Mauro Goins Jr., MD    Time out: Immediately prior to procedure a "time out" was called to verify the correct patient, procedure, equipment, support staff and site/side marked as required.      Type:  Cyst  Body area:  Head/neck  Location details:  Neck  Anesthesia:  Local infiltration  Local anesthetic: Lidocaine 1% with epinephrine  Anesthetic total (ml):  6  Scalpel size:  11  Incision type:  Single straight  Incision depth: subcutaneous    Complexity:  Simple  Drainage:  Pus  Drainage amount:  Moderate  Wound treatment:  Incision, wound left open, drainage and expression of material  Packing material: Cover with gauze.  Patient tolerance:  Patient tolerated the procedure well with no immediate complications    "

## 2025-09-05 ENCOUNTER — OFFICE VISIT (OUTPATIENT)
Dept: UROLOGY | Facility: CLINIC | Age: 70
End: 2025-09-05
Payer: MEDICARE

## 2025-09-05 VITALS
WEIGHT: 180.31 LBS | HEIGHT: 70 IN | DIASTOLIC BLOOD PRESSURE: 78 MMHG | HEART RATE: 79 BPM | SYSTOLIC BLOOD PRESSURE: 116 MMHG | BODY MASS INDEX: 25.81 KG/M2 | OXYGEN SATURATION: 96 %

## 2025-09-05 DIAGNOSIS — N28.89 RENAL MASS: ICD-10-CM

## 2025-09-05 DIAGNOSIS — C61 PROSTATE CANCER: Primary | ICD-10-CM

## 2025-09-05 RX ORDER — TAMSULOSIN HYDROCHLORIDE 0.4 MG/1
1 CAPSULE ORAL DAILY
Qty: 90 CAPSULE | Refills: 3 | Status: SHIPPED | OUTPATIENT
Start: 2025-09-05 | End: 2026-09-05

## (undated) DEVICE — FORCEP JAW RADIAL PULM 2X100CM

## (undated) DEVICE — LUBRICANT SURGILUBE 2 OZ

## (undated) DEVICE — CONNECTOR SWIVEL

## (undated) DEVICE — BOWL STERILE LARGE 32OZ

## (undated) DEVICE — NDL FLEXISION BIOPSY 21G

## (undated) DEVICE — GOWN POLY REINF BRTH SLV XL

## (undated) DEVICE — ADAPTER VISION PROBE & SUCTION

## (undated) DEVICE — CATH BRONCHOSCOPE F/BF

## (undated) DEVICE — ALCOHOL BLEND 95%

## (undated) DEVICE — DRAPE THREE-QTR REINF 53X77IN

## (undated) DEVICE — SYR SLIP TIP 20CC

## (undated) DEVICE — DRESSING TRANS 6X8 TEGADERM

## (undated) DEVICE — CONTAINER SPECIMEN STRL 4OZ

## (undated) DEVICE — SYS LABEL CORRECT MED

## (undated) DEVICE — NDL VIZISHOT 2 FLEX 22G

## (undated) DEVICE — BAG ION VISION PROBE

## (undated) DEVICE — SYR 10CC LUER LOCK

## (undated) DEVICE — ADAPTER SWIVEL

## (undated) DEVICE — CYTOSPIN COLLECTION FLUID BLT

## (undated) DEVICE — PENCIL GOLF STERILE

## (undated) DEVICE — NDL ASPIRATING VIZISHOT 20-40M

## (undated) DEVICE — GAUZE SPONGE 4X4 12PLY

## (undated) DEVICE — PACK SET UP CONVERTORS

## (undated) DEVICE — SPONGE COTTON TRAY 4X4IN

## (undated) DEVICE — TUBING SUC UNIV W/CONN 12FT

## (undated) DEVICE — KIT ANTIFOG W/SPONG & FLUID

## (undated) DEVICE — NDL VIZISHOT 2 FLEX 19G

## (undated) DEVICE — PACK ECLIPSE SET-UP W/O DRAPE